# Patient Record
Sex: FEMALE | Race: WHITE | NOT HISPANIC OR LATINO | Employment: OTHER | URBAN - METROPOLITAN AREA
[De-identification: names, ages, dates, MRNs, and addresses within clinical notes are randomized per-mention and may not be internally consistent; named-entity substitution may affect disease eponyms.]

---

## 2020-09-23 ENCOUNTER — TRANSFERRED RECORDS (OUTPATIENT)
Dept: HEALTH INFORMATION MANAGEMENT | Facility: CLINIC | Age: 70
End: 2020-09-23

## 2021-09-17 ENCOUNTER — TRANSFERRED RECORDS (OUTPATIENT)
Dept: HEALTH INFORMATION MANAGEMENT | Facility: CLINIC | Age: 71
End: 2021-09-17

## 2021-10-15 ENCOUNTER — TRANSFERRED RECORDS (OUTPATIENT)
Dept: HEALTH INFORMATION MANAGEMENT | Facility: CLINIC | Age: 71
End: 2021-10-15

## 2021-10-28 ENCOUNTER — TRANSFERRED RECORDS (OUTPATIENT)
Dept: HEALTH INFORMATION MANAGEMENT | Facility: CLINIC | Age: 71
End: 2021-10-28

## 2023-05-09 ENCOUNTER — TELEPHONE (OUTPATIENT)
Dept: ANESTHESIOLOGY | Facility: CLINIC | Age: 73
End: 2023-05-09

## 2023-05-09 NOTE — TELEPHONE ENCOUNTER
Received records from [facility] information [address] Mail stop [id number] [place] [id number]    Sent to scanning

## 2023-05-16 ENCOUNTER — TELEPHONE (OUTPATIENT)
Dept: ANESTHESIOLOGY | Facility: CLINIC | Age: 73
End: 2023-05-16

## 2023-05-16 NOTE — TELEPHONE ENCOUNTER
Received forms from [facility]  [address] [facility] [address]   [phone]  Fax [phone]    Sent to scanning

## 2023-07-19 ENCOUNTER — TELEPHONE (OUTPATIENT)
Dept: ANESTHESIOLOGY | Facility: CLINIC | Age: 73
End: 2023-07-19

## 2023-07-19 NOTE — TELEPHONE ENCOUNTER
Received records from [facility]  [address] [place] [address] ph  [phone]    Sent to scanning

## 2023-07-19 NOTE — TELEPHONE ENCOUNTER
Received records from [facility] [address] [place] [address]   [phone]  Fax [phone]    Sent to scanning

## 2023-07-20 ENCOUNTER — TRANSCRIBE ORDERS (OUTPATIENT)
Dept: OTHER | Age: 73
End: 2023-07-20

## 2023-07-20 ENCOUNTER — TELEPHONE (OUTPATIENT)
Dept: PALLIATIVE MEDICINE | Facility: CLINIC | Age: 73
End: 2023-07-20

## 2023-07-20 DIAGNOSIS — G89.29 CHRONIC KNEE PAIN, UNSPECIFIED LATERALITY: Primary | ICD-10-CM

## 2023-07-20 DIAGNOSIS — M25.569 CHRONIC KNEE PAIN, UNSPECIFIED LATERALITY: Primary | ICD-10-CM

## 2023-07-20 NOTE — TELEPHONE ENCOUNTER
[facility]    Phone Message    May a detailed message be left on voicemail: yes     Reason for Call: Appointment Intake    Referring Provider Name: Osiris Fernandes   : Dena RODRIGUEZ research coordinator   Diagnosis and/or Symptoms: Chronic knee pain, unspecified laterality [M25.569, G89.29]    Writer wasn't sure to schedule as referral is from outside of [facility] and Pt stated she is suppose to be apart of a study and specifically work with  or . Please call back to schedule/advise    Action Taken: Message routed to:  Other: [facility]    Travel Screening: Not Applicable

## 2023-08-20 ENCOUNTER — HEALTH MAINTENANCE LETTER (OUTPATIENT)
Age: 73
End: 2023-08-20

## 2023-08-31 ASSESSMENT — PAIN SCALES - PAIN ENJOYMENT GENERAL ACTIVITY SCALE (PEG)
PEG_TOTALSCORE: 3.67
INTERFERED_ENJOYMENT_LIFE: 2
AVG_PAIN_PASTWEEK: 6
INTERFERED_GENERAL_ACTIVITY: 3

## 2023-08-31 ASSESSMENT — ANXIETY QUESTIONNAIRES
IF YOU CHECKED OFF ANY PROBLEMS ON THIS QUESTIONNAIRE, HOW DIFFICULT HAVE THESE PROBLEMS MADE IT FOR YOU TO DO YOUR WORK, TAKE CARE OF THINGS AT HOME, OR GET ALONG WITH OTHER PEOPLE: NOT DIFFICULT AT ALL
GAD7 TOTAL SCORE: 0
4. TROUBLE RELAXING: NOT AT ALL
6. BECOMING EASILY ANNOYED OR IRRITABLE: NOT AT ALL
1. FEELING NERVOUS, ANXIOUS, OR ON EDGE: NOT AT ALL
7. FEELING AFRAID AS IF SOMETHING AWFUL MIGHT HAPPEN: NOT AT ALL
5. BEING SO RESTLESS THAT IT IS HARD TO SIT STILL: NOT AT ALL
3. WORRYING TOO MUCH ABOUT DIFFERENT THINGS: NOT AT ALL
GAD7 TOTAL SCORE: 0
2. NOT BEING ABLE TO STOP OR CONTROL WORRYING: NOT AT ALL

## 2023-09-07 ENCOUNTER — ANCILLARY PROCEDURE (OUTPATIENT)
Dept: GENERAL RADIOLOGY | Facility: CLINIC | Age: 73
End: 2023-09-07
Attending: ANESTHESIOLOGY
Payer: MEDICARE

## 2023-09-07 ENCOUNTER — OFFICE VISIT (OUTPATIENT)
Dept: ANESTHESIOLOGY | Facility: CLINIC | Age: 73
End: 2023-09-07
Payer: MEDICARE

## 2023-09-07 VITALS
OXYGEN SATURATION: 96 % | DIASTOLIC BLOOD PRESSURE: 82 MMHG | HEART RATE: 74 BPM | RESPIRATION RATE: 16 BRPM | SYSTOLIC BLOOD PRESSURE: 166 MMHG

## 2023-09-07 DIAGNOSIS — G89.29 CHRONIC PAIN OF LEFT KNEE: Primary | ICD-10-CM

## 2023-09-07 DIAGNOSIS — G89.29 CHRONIC PAIN OF LEFT KNEE: ICD-10-CM

## 2023-09-07 DIAGNOSIS — M25.562 CHRONIC PAIN OF LEFT KNEE: ICD-10-CM

## 2023-09-07 DIAGNOSIS — M25.562 CHRONIC PAIN OF LEFT KNEE: Primary | ICD-10-CM

## 2023-09-07 PROCEDURE — 73562 X-RAY EXAM OF KNEE 3: CPT | Mod: LT | Performed by: RADIOLOGY

## 2023-09-07 PROCEDURE — 99204 OFFICE O/P NEW MOD 45 MIN: CPT | Performed by: ANESTHESIOLOGY

## 2023-09-07 RX ORDER — CHLORAL HYDRATE 500 MG
2 CAPSULE ORAL DAILY
COMMUNITY

## 2023-09-07 RX ORDER — LEVOTHYROXINE SODIUM 88 UG/1
88 TABLET ORAL DAILY
COMMUNITY

## 2023-09-07 RX ORDER — ATORVASTATIN CALCIUM 20 MG/1
20 TABLET, FILM COATED ORAL DAILY
COMMUNITY

## 2023-09-07 RX ORDER — VIT C/E/ZN/COPPR/LUTEIN/ZEAXAN 60 MG-6 MG
1 CAPSULE ORAL DAILY
COMMUNITY

## 2023-09-07 RX ORDER — SODIUM PHOSPHATE,MONO-DIBASIC 19G-7G/118
1 ENEMA (ML) RECTAL DAILY
COMMUNITY

## 2023-09-07 RX ORDER — DULOXETIN HYDROCHLORIDE 20 MG/1
20 CAPSULE, DELAYED RELEASE ORAL DAILY
Qty: 30 CAPSULE | Refills: 1 | Status: SHIPPED | OUTPATIENT
Start: 2023-09-07 | End: 2024-06-14

## 2023-09-07 ASSESSMENT — PAIN SCALES - GENERAL: PAINLEVEL: MILD PAIN (2)

## 2023-09-07 NOTE — NURSING NOTE
RN reviewed AVS with patient. Patient to contact clinic if any questions/concerns. Patient verbalized understanding.    Tricia Irizarry RNCC

## 2023-09-07 NOTE — LETTER
9/7/2023       RE: Maribell Mendoza  [address]  [facility] [address]       Dear Colleague,    Thank you for referring your patient, Maribell Mendoza, to the [facility] FOR [facility]NAGEMENT [place] at [facility]. Please see a copy of my visit note below.                          [facility] Consultation    Date of visit: 9/7/2023    Reason for consultation:    Maribell Mendoza is a 73 year old female who is seen in consultation today for potential enrollment in the SKOAP study due to chronic left knee pain.    Primary Care Provider is Osiris Fernandes.  Pain medications are being prescribed by PCP.    Please see the [facility] [place] health questionnaire which the patient completed and reviewed with me in detail.    Chief Complaint:    Chief Complaint   Patient presents with    Consult     New SKOAP       Pain history:  Maribell Mendoza is a 73 year old female who first started having problems with pain in the left knee that has been present for many years. Had an initial skiing injury over 50 years ago. Approximately 2 years ago, was out walking when suddenly her left knee began to hurt. She was seen by [facility] and was diagnosed with arthritis in the knee. At that time she did physical therapy and she did well overall up until this summer when the discomfort increased. She was still able to be active, but notes walking long distances is painful. She has been using a knee brace for stability and this is helpful. When she is sitting there is no pain. However, with sleep there is some pain and she does occasionally notice the pain and it will wake her up.     Swimming is very helpful  Yoga stretches are helpful  30 min in gym and 30 min in pool     PT 2 years ago  No injections  Doesn't take any medications for pain  Sometimes wears a knee brace  Never tried anything topical, rarely takes aleve    Pain  rating: intensity ranges from 0/10 to 5/10, and Averages 1/10 on a 0-10 scale.  Aggravating factors include: walking long distances, prolonged standing  Relieving factors include: Elevating leg, rest  Any bowel or bladder incontinence: n/a    Current treatments include:  Rare Aleve - maybe once per week    Previous medication treatments included:  none    Other treatments have included:  Maribell Mendoza has not been seen at a pain clinic in the past.    PT: yes, 2 years ago  Acupuncture: no  TENs Unit: no  Injections: no    Past Medical History:  No past medical history on file.  There are no problems to display for this patient.      Past Surgical History:  No past surgical history on file.  Medications:  Current Outpatient Medications   Medication Sig Dispense Refill    atorvastatin (LIPITOR) 20 MG tablet Take 20 mg by mouth daily      diclofenac (VOLTAREN) 1 % topical gel Apply 2 g topically 4 times daily as needed for moderate pain 350 g 11    DULoxetine (CYMBALTA) 20 MG capsule Take 1 capsule (20 mg) by mouth daily 30 capsule 1    fish oil-omega-3 fatty acids 1000 MG capsule Take 2 g by mouth daily      glucosamine-chondroitin 500-400 MG CAPS per capsule Take 1 capsule by mouth daily      levothyroxine (SYNTHROID/LEVOTHROID) 88 MCG tablet Take 88 mcg by mouth daily      multivitamin  with lutein (OCUVITE WITH LUTEIN) CAPS per capsule Take 1 capsule by mouth daily       Allergies:   No Known Allergies  Social History:  Home situation: lives alone in [place]  Occupation/Schooling: retired  Tobacco use: denies  Alcohol use: denies  Drug use: denies  History of chemical dependency treatment: denies    Family history:  No family history on file.      Review of Systems:    POSTIVE IN BOLD  GENERAL: fever/chills, fatigue, general unwell feeling, weight gain/loss.  HEAD/EYES:  headache, dizziness, or vision changes.    EARS/NOSE/THROAT:  Nosebleeds, hearing loss, sinus infection, earache, tinnitus.  IMMUNE:   Allergies, cancer, immune deficiency, or infections.  SKIN:  Urticaria, rash, hives  HEME/Lymphatic:   anemia, easy bruising, easy bleeding.  RESPIRATORY:  cough, wheezing, or shortness of breath  CARDIOVASCULAR/Circulation:  Extremity edema, syncope, hypertension, tachycardia, or angina.  GASTROINTESTINAL:  abdominal pain, nausea/emesis, diarrhea, constipation,  hematochezia, or melena.  ENDOCRINE:  Diabetes, steroid use,  thyroid disease or osteoporosis.  MUSCULOSKELETAL: neck pain, back pain, arthralgia, arthritis, or gout.  GENITOURINARY:  frequency, urgency, dysuria, difficulty voiding, hematuria or incontinence.  NEUROLOGIC:  weakness, numbness, paresthesias, seizure, tremor, stroke or memory loss.  PSYCHIATRIC:  depression, anxiety, stress, suicidal thoughts or mood swings.     Physical Exam:  Vitals:    09/07/23 1246   BP: (!) 166/82   BP Location: Right arm   Patient Position: Chair   Cuff Size: Adult Regular   Pulse: 74   Resp: 16   SpO2: 96%     Exam:  Constitutional: healthy, alert, and no distress  Head: normocephalic. Atraumatic.   Eyes: no redness or jaundice noted   ENT: oropharnx normal.  MMM.  Neck supple.    Cardiovascular: RRR no m/g/r   Respiratory: clear   Gastrointestinal: soft, non-tender, normoactive bowel sounds   : deferred  Skin: no suspicious lesions or rashes  Psychiatric: mentation appears normal and affect normal/bright    Musculoskeletal exam:  Gait/Station/Posture: normal, non antalgic    Left knee examination  Inspection: no gross deformity  Palpation: tender to palpation along medial joint line  ROM: full in flexion and extension  Valgus/varus laxity/pain: negative  Anterior drawer test: negative  Posterior drawer test: negative  Mo test: negative  Thessaly test: negative  Lachman test: negative  Ely's test: negative  Positive crepitus with flexion and extension      Neurologic exam:  CN:  Cranial nerves 2-12 are normal  Motor:  5/5 LE strength    Diagnostic tests:  No  pertinent imaging available    Other testing (labs, diagnostics) reviewed:  Labs  No results found for: A1C  Last Comprehensive Metabolic Panel:  No results found for: NA, POTASSIUM, CHLORIDE, CO2, ANIONGAP, GLC, BUN, CR, GFRESTIMATED, GALINDO      [place] Prescription Monitoring Program reviewed    Outside records reviewed        Assessment:  Chronic Left Knee Pain 2/2 Osteoarthritis    Maribell Mendoza is a 73 year old female who presents with the complaints of chronic left knee pain. This has been an ongoing issue for over 2 years. She has previously been seen at an outside orthopedic clinic and has been diagnosed with osteoarthritis of the knee. Based on her history and physical exam today, her symptoms are consistent with this diagnosis. She has enrolled in the SKOAP study, phase 1.     Plan:  Diagnosis reviewed, treatment option addressed, and risk/benefits discussed.  Self-care instructions given.  I am recommending a multidisciplinary treatment plan to help this patient better manage her pain.      Physical Therapy: continue current regimen and new referral placed  Pain Psychologist to address issues of relaxation, behavioral change, coping style, and other factors important to improvement: not att his time  Diagnostic Studies: Xray of Left knee  Medication Management: Rx for cymbalta per study protocol and PRN diclofenac gel   Further procedures recommended: none at this time per study protocol    Recommendations/follow-up for PCP:  none  Follow up: per study protocol    Total time spent was 45 minutes, and more than 50% of face to face time was spent in counseling and/or coordination of care regarding principles of multidisciplinary care, medication management, and therapeutic options.       Answers submitted by the patient for this visit:  OLGA LIDIA-7 (Submitted on 8/31/2023)  OLGA LIDIA 7 TOTAL SCORE: 0      Again, thank you for allowing me to participate in the care of your patient.      Sincerely,    Neha Mehta MD

## 2023-09-07 NOTE — PATIENT INSTRUCTIONS
Medications:    diclofenac (VOLTAREN) 1 % topical gel - Apply 2 g topically 4 times daily as needed for moderate pain     DULoxetine (CYMBALTA) 20 MG capsule - Take 1 capsule (20 mg) by mouth daily     Please provide the clinic with a minium of 1 week notice, on all prescription refills.       Referrals:    External Physical Therapy referral placed.      Imaging:    IMAGING SERVICES HOURS:    All imaging modalities are available from 7 a.m. - 9 p.m. Monday through Friday  X-ray, CT, MRI, and General Ultrasound appointments are available from 7 a.m. -3:30 p.m. on Saturdays  X-ray, CT and MRI appointments are available from 8 a.m. - 4:30 p.m. on Sundays  Please call [phone] to schedule imaging exams       Recommended Follow up:      Follow up per study protocol.    To speak with a nurse, schedule/reschedule/cancel a clinic appointment, or request a medication refill call: [phone].    You can also reach us by [employer]: [email or web address]

## 2023-09-07 NOTE — PROGRESS NOTES
[facility] Consultation    Date of visit: 9/7/2023    Reason for consultation:    Maribell Mendoza is a 73 year old female who is seen in consultation today for potential enrollment in the SKOAP study due to chronic left knee pain.    Primary Care Provider is Osiris Fernandes.  Pain medications are being prescribed by PCP.    Please see the [facility] health questionnaire which the patient completed and reviewed with me in detail.    Chief Complaint:    Chief Complaint   Patient presents with    Consult     [facility]       Pain history:  Maribell Mendoza is a 73 year old female who first started having problems with pain in the left knee that has been present for many years. Had an initial skiing injury over 50 years ago. Approximately 2 years ago, was out walking when suddenly her left knee began to hurt. She was seen by [facility] and was diagnosed with arthritis in the knee. At that time she did physical therapy and she did well overall up until this summer when the discomfort increased. She was still able to be active, but notes walking long distances is painful. She has been using a knee brace for stability and this is helpful. When she is sitting there is no pain. However, with sleep there is some pain and she does occasionally notice the pain and it will wake her up.     Swimming is very helpful  Yoga stretches are helpful  30 min in gym and 30 min in pool     PT 2 years ago  No injections  Doesn't take any medications for pain  Sometimes wears a knee brace  Never tried anything topical, rarely takes aleve    Pain rating: intensity ranges from 0/10 to 5/10, and Averages 1/10 on a 0-10 scale.  Aggravating factors include: walking long distances, prolonged standing  Relieving factors include: Elevating leg, rest  Any bowel or bladder incontinence: n/a    Current treatments include:  Rare Aleve - maybe once per week    Previous medication treatments  included:  none    Other treatments have included:  Maribell Mendoza has not been seen at a pain clinic in the past.    PT: yes, 2 years ago  Acupuncture: no  TENs Unit: no  Injections: no    Past Medical History:  No past medical history on file.  There are no problems to display for this patient.      Past Surgical History:  No past surgical history on file.  Medications:  Current Outpatient Medications   Medication Sig Dispense Refill    atorvastatin (LIPITOR) 20 MG tablet Take 20 mg by mouth daily      diclofenac (VOLTAREN) 1 % topical gel Apply 2 g topically 4 times daily as needed for moderate pain 350 g 11    DULoxetine (CYMBALTA) 20 MG capsule Take 1 capsule (20 mg) by mouth daily 30 capsule 1    fish oil-omega-3 fatty acids 1000 MG capsule Take 2 g by mouth daily      glucosamine-chondroitin 500-400 MG CAPS per capsule Take 1 capsule by mouth daily      levothyroxine (SYNTHROID/LEVOTHROID) 88 MCG tablet Take 88 mcg by mouth daily      multivitamin  with lutein (OCUVITE WITH LUTEIN) CAPS per capsule Take 1 capsule by mouth daily       Allergies:   No Known Allergies  Social History:  Home situation: lives alone in [place]  Occupation/Schooling: retired  Tobacco use: denies  Alcohol use: denies  Drug use: denies  History of chemical dependency treatment: denies    Family history:  No family history on file.      Review of Systems:    POSTIVE IN BOLD  GENERAL: fever/chills, fatigue, general unwell feeling, weight gain/loss.  HEAD/EYES:  headache, dizziness, or vision changes.    EARS/NOSE/THROAT:  Nosebleeds, hearing loss, sinus infection, earache, tinnitus.  IMMUNE:  Allergies, cancer, immune deficiency, or infections.  SKIN:  Urticaria, rash, hives  HEME/Lymphatic:   anemia, easy bruising, easy bleeding.  RESPIRATORY:  cough, wheezing, or shortness of breath  CARDIOVASCULAR/Circulation:  Extremity edema, syncope, hypertension, tachycardia, or angina.  GASTROINTESTINAL:  abdominal pain, nausea/emesis,  diarrhea, constipation,  hematochezia, or melena.  ENDOCRINE:  Diabetes, steroid use,  thyroid disease or osteoporosis.  MUSCULOSKELETAL: neck pain, back pain, arthralgia, arthritis, or gout.  GENITOURINARY:  frequency, urgency, dysuria, difficulty voiding, hematuria or incontinence.  NEUROLOGIC:  weakness, numbness, paresthesias, seizure, tremor, stroke or memory loss.  PSYCHIATRIC:  depression, anxiety, stress, suicidal thoughts or mood swings.     Physical Exam:  Vitals:    09/07/23 1246   BP: (!) 166/82   BP Location: Right arm   Patient Position: Chair   Cuff Size: Adult Regular   Pulse: 74   Resp: 16   SpO2: 96%     Exam:  Constitutional: healthy, alert, and no distress  Head: normocephalic. Atraumatic.   Eyes: no redness or jaundice noted   ENT: oropharnx normal.  MMM.  Neck supple.    Cardiovascular: RRR no m/g/r   Respiratory: clear   Gastrointestinal: soft, non-tender, normoactive bowel sounds   : deferred  Skin: no suspicious lesions or rashes  Psychiatric: mentation appears normal and affect normal/bright    Musculoskeletal exam:  Gait/Station/Posture: normal, non antalgic    Left knee examination  Inspection: no gross deformity  Palpation: tender to palpation along medial joint line  ROM: full in flexion and extension  Valgus/varus laxity/pain: negative  Anterior drawer test: negative  Posterior drawer test: negative  Mo test: negative  Thessaly test: negative  Lachman test: negative  Ely's test: negative  Positive crepitus with flexion and extension      Neurologic exam:  CN:  Cranial nerves 2-12 are normal  Motor:  5/5 LE strength    Diagnostic tests:  No pertinent imaging available    Other testing (labs, diagnostics) reviewed:  Labs  No results found for: A1C  Last Comprehensive Metabolic Panel:  No results found for: NA, POTASSIUM, CHLORIDE, CO2, ANIONGAP, GLC, BUN, CR, GFRESTIMATED, GALINDO      [place] Prescription Monitoring Program reviewed    Outside records reviewed        Assessment:  Chronic  Left Knee Pain 2/2 Osteoarthritis    Maribell Mendoza is a 73 year old female who presents with the complaints of chronic left knee pain. This has been an ongoing issue for over 2 years. She has previously been seen at an outside orthopedic clinic and has been diagnosed with osteoarthritis of the knee. Based on her history and physical exam today, her symptoms are consistent with this diagnosis. She has enrolled in the SKOAP study, phase 1.     Plan:  Diagnosis reviewed, treatment option addressed, and risk/benefits discussed.  Self-care instructions given.  I am recommending a multidisciplinary treatment plan to help this patient better manage her pain.      Physical Therapy: continue current regimen and new referral placed  Pain Psychologist to address issues of relaxation, behavioral change, coping style, and other factors important to improvement: not att his time  Diagnostic Studies: Xray of Left knee  Medication Management: Rx for cymbalta per study protocol and PRN diclofenac gel   Further procedures recommended: none at this time per study protocol    Recommendations/follow-up for PCP:  none  Follow up: per study protocol    Total time spent was 45 minutes, and more than 50% of face to face time was spent in counseling and/or coordination of care regarding principles of multidisciplinary care, medication management, and therapeutic options.     Neha Mehta MD    Pain Medicine  Department of Anesthesiology  [facility]      Answers submitted by the patient for this visit:  OLGA LIDIA-7 (Submitted on 8/31/2023)  OLGA LIDIA 7 TOTAL SCORE: 0

## 2023-09-07 NOTE — NURSING NOTE
Patient presents with:  Consult: New JENNIFER      Mild Pain (2)         What medications are you using for pain? no    (New patients only) Have you been seen by another pain clinic/ provider? no    (Return Patients only) What refills are you needing today? no    Expectations: no, education     Essence Carney, EMT

## 2023-09-13 ASSESSMENT — ACTIVITIES OF DAILY LIVING (ADL)
GO DOWN STAIRS: ACTIVITY IS FAIRLY DIFFICULT
PAIN: THE SYMPTOM AFFECTS MY ACTIVITY MODERATELY
WALK: ACTIVITY IS SOMEWHAT DIFFICULT
STIFFNESS: THE SYMPTOM AFFECTS MY ACTIVITY MODERATELY
KNEEL ON THE FRONT OF YOUR KNEE: ACTIVITY IS SOMEWHAT DIFFICULT
KNEE_ACTIVITY_OF_DAILY_LIVING_SUM: 42
HOW_WOULD_YOU_RATE_THE_CURRENT_FUNCTION_OF_YOUR_KNEE_DURING_YOUR_USUAL_DAILY_ACTIVITIES_ON_A_SCALE_FROM_0_TO_100_WITH_100_BEING_YOUR_LEVEL_OF_KNEE_FUNCTION_PRIOR_TO_YOUR_INJURY_AND_0_BEING_THE_INABILITY_TO_PERFORM_ANY_OF_YOUR_USUAL_DAILY_ACTIVITIES?: 50
WEAKNESS: THE SYMPTOM AFFECTS MY ACTIVITY SLIGHTLY
SWELLING: THE SYMPTOM AFFECTS MY ACTIVITY SLIGHTLY
STAND: ACTIVITY IS MINIMALLY DIFFICULT
KNEE_ACTIVITY_OF_DAILY_LIVING_SCORE: 60
GO UP STAIRS: ACTIVITY IS SOMEWHAT DIFFICULT
RAW_SCORE: 42
HOW_WOULD_YOU_RATE_THE_OVERALL_FUNCTION_OF_YOUR_KNEE_DURING_YOUR_USUAL_DAILY_ACTIVITIES?: NEARLY NORMAL
GIVING WAY, BUCKLING OR SHIFTING OF KNEE: THE SYMPTOM AFFECTS MY ACTIVITY SLIGHTLY
SQUAT: ACTIVITY IS SOMEWHAT DIFFICULT
SIT WITH YOUR KNEE BENT: ACTIVITY IS MINIMALLY DIFFICULT
RISE FROM A CHAIR: ACTIVITY IS MINIMALLY DIFFICULT
LIMPING: THE SYMPTOM AFFECTS MY ACTIVITY SLIGHTLY
AS_A_RESULT_OF_YOUR_KNEE_INJURY,_HOW_WOULD_YOU_RATE_YOUR_CURRENT_LEVEL_OF_DAILY_ACTIVITY?: NEARLY NORMAL

## 2023-09-15 ENCOUNTER — THERAPY VISIT (OUTPATIENT)
Dept: PHYSICAL THERAPY | Facility: CLINIC | Age: 73
End: 2023-09-15
Attending: ANESTHESIOLOGY
Payer: MEDICARE

## 2023-09-15 DIAGNOSIS — G89.29 CHRONIC PAIN OF LEFT KNEE: ICD-10-CM

## 2023-09-15 DIAGNOSIS — M25.562 CHRONIC PAIN OF LEFT KNEE: ICD-10-CM

## 2023-09-15 PROCEDURE — 97110 THERAPEUTIC EXERCISES: CPT | Mod: GP | Performed by: PHYSICAL THERAPIST

## 2023-09-15 PROCEDURE — 97161 PT EVAL LOW COMPLEX 20 MIN: CPT | Mod: GP | Performed by: PHYSICAL THERAPIST

## 2023-09-15 NOTE — PROGRESS NOTES
PHYSICAL THERAPY EVALUATION  Type of Visit: Evaluation    See electronic medical record for Abuse and Falls Screening details.    Subjective       Presenting condition or subjective complaint: L knee pain which started 2 years ago, insidious onset, pain came on with walking after some hill.  Pt eventually went in to [facility] and went through PT and wears a brace, pain eventually improved.  Pt notes that she is walking/biking less now due to fear of pain with her knee.  Pt is doing more swimming right now.  Pain returned this summer, pt is unsure as to why.  Date of onset: 09/07/23    Relevant medical history: Arthritis; Osteoporosis; Pain at night or rest   Dates & types of surgery: 2 bunion surgeries at [facility]   eye surgeries at [facility]  finger surgery @ [facility]    Prior diagnostic imaging/testing results: X-ray     Prior therapy history for the same diagnosis, illness or injury: Yes 2 yrs ago   p/t and gym recommendation by [facility]    Prior Level of Function  Transfers:   Ambulation:   ADL:   IADL:     Living Environment  Social support: Alone   Type of home: 1 level; Basement   Stairs to enter the home: Yes 4 Is there a railing: Yes   Ramp: No   Stairs inside the home: Yes 16 Is there a railing: Yes   Help at home: None  Equipment owned:       Employment: No    Hobbies/Interests: travel   swim biking    Patient goals for therapy: get into the car without difficulty after walking       Objective   KNEE EVALUATION  PAIN: Pain Level at Rest: 0/10  Pain Level with Use: 5/10  Pain Location: knee  Pain Quality: Aching  Pain Frequency: intermittent  Pain is Worst: daytime  Pain is Exacerbated By: walking  Pain is Relieved By: rest  Pain Progression: Worsened  INTEGUMENTARY (edema, incisions):   POSTURE:   GAIT:  Weightbearing Status:   Assistive Device(s):   Gait Deviations:   BALANCE/PROPRIOCEPTION:   WEIGHTBEARING ALIGNMENT:   NON-WEIGHTBEARING ALIGNMENT:   ROM:   (Degrees) Left AROM Left PROM  Right AROM Right PROM   Knee  Flexion wnl wnl wnl wnl   Knee Extension -1 0 0 0   Pain:   End feel:     STRENGTH:  MMT: L quad set: poor, L hamstring 4+/5, L glut med 4/5, L glut max 4+/5, L quadriceps 4+/5.   FLEXIBILITY:   SPECIAL TESTS:   FUNCTIONAL TESTS:   PALPATION:  TTP: L pes anserine, medial joint line  JOINT MOBILITY:     Assessment & Plan   CLINICAL IMPRESSIONS  Medical Diagnosis: Chronic pain of left knee    Treatment Diagnosis: L knee pain   Impression/Assessment: Patient is a 73 year old female with L knee complaints.  The following significant findings have been identified: Pain, Decreased ROM/flexibility, Decreased strength, Impaired gait, Impaired muscle performance, and Decreased activity tolerance. These impairments interfere with their ability to perform self care tasks, recreational activities, household chores, driving , household mobility, and community mobility as compared to previous level of function.     Clinical Decision Making (Complexity):  Clinical Presentation: Stable/Uncomplicated  Clinical Presentation Rationale: based on medical and personal factors listed in PT evaluation  Clinical Decision Making (Complexity): Low complexity    PLAN OF CARE  Treatment Interventions:  Interventions: Gait Training, Manual Therapy, Neuromuscular Re-education, Therapeutic Activity, Therapeutic Exercise, Self-Care/Home Management    Long Term Goals            Frequency of Treatment: 1x/week  Duration of Treatment: 8 weeks    Education Assessment:   Learner/Method: Patient;Listening;Demonstration;Pictures/Video  Education Comments: Educated pt on findings of the evaluation and reasoning for plan of care.  Pt was able to understand how treatment plan aligns with goals.    Risks and benefits of evaluation/treatment have been explained.   Patient/Family/caregiver agrees with Plan of Care.     Evaluation Time:           Signing Clinician: Ady Pelletier PT      [facility]                                                                                    OUTPATIENT PHYSICAL THERAPY      PLAN OF TREATMENT FOR OUTPATIENT REHABILITATION   Patient's Last Name, First Name, Maribell Wallace YOB: 1950   Provider's Name   [facility]   Medical Record No.  [id number]     Onset Date: 09/07/23  Start of Care Date: 09/15/23     Medical Diagnosis:  Chronic pain of left knee      PT Treatment Diagnosis:  L knee pain Plan of Treatment  Frequency/Duration: 1x/week/ 8 weeks    Certification date from 09/15/23 to 11/10/23         See note for plan of treatment details and functional goals     Ady Pelletier PT                         I CERTIFY THE NEED FOR THESE SERVICES FURNISHED UNDER        THIS PLAN OF TREATMENT AND WHILE UNDER MY CARE     (Physician attestation of this document indicates review and certification of the therapy plan).                Referring Provider:  Neha Mehta      Initial Assessment  See Epic Evaluation- Start of Care Date: 09/15/23

## 2023-10-03 ENCOUNTER — THERAPY VISIT (OUTPATIENT)
Dept: PHYSICAL THERAPY | Facility: CLINIC | Age: 73
End: 2023-10-03
Attending: ANESTHESIOLOGY
Payer: MEDICARE

## 2023-10-03 ENCOUNTER — OFFICE VISIT (OUTPATIENT)
Dept: URGENT CARE | Facility: URGENT CARE | Age: 73
End: 2023-10-03
Payer: MEDICARE

## 2023-10-03 VITALS
DIASTOLIC BLOOD PRESSURE: 73 MMHG | TEMPERATURE: 97.8 F | SYSTOLIC BLOOD PRESSURE: 136 MMHG | OXYGEN SATURATION: 99 % | WEIGHT: 161 LBS | RESPIRATION RATE: 14 BRPM | HEART RATE: 66 BPM

## 2023-10-03 DIAGNOSIS — G89.29 CHRONIC PAIN OF LEFT KNEE: Primary | ICD-10-CM

## 2023-10-03 DIAGNOSIS — M25.562 CHRONIC PAIN OF LEFT KNEE: Primary | ICD-10-CM

## 2023-10-03 DIAGNOSIS — T16.1XXA ACUTE FOREIGN BODY OF RIGHT EAR CANAL, INITIAL ENCOUNTER: Primary | ICD-10-CM

## 2023-10-03 PROCEDURE — 97110 THERAPEUTIC EXERCISES: CPT | Mod: GP | Performed by: PHYSICAL THERAPIST

## 2023-10-03 PROCEDURE — 69200 CLEAR OUTER EAR CANAL: CPT | Mod: RT | Performed by: FAMILY MEDICINE

## 2023-10-03 PROCEDURE — 99203 OFFICE O/P NEW LOW 30 MIN: CPT | Mod: 25 | Performed by: FAMILY MEDICINE

## 2023-10-03 ASSESSMENT — ACTIVITIES OF DAILY LIVING (ADL)
HOW_WOULD_YOU_RATE_THE_CURRENT_FUNCTION_OF_YOUR_KNEE_DURING_YOUR_USUAL_DAILY_ACTIVITIES_ON_A_SCALE_FROM_0_TO_100_WITH_100_BEING_YOUR_LEVEL_OF_KNEE_FUNCTION_PRIOR_TO_YOUR_INJURY_AND_0_BEING_THE_INABILITY_TO_PERFORM_ANY_OF_YOUR_USUAL_DAILY_ACTIVITIES?: 70
RAW_SCORE: 60
STAND: ACTIVITY IS NOT DIFFICULT
SIT WITH YOUR KNEE BENT: ACTIVITY IS MINIMALLY DIFFICULT
PAIN: I HAVE THE SYMPTOM BUT IT DOES NOT AFFECT MY ACTIVITY
AS_A_RESULT_OF_YOUR_KNEE_INJURY,_HOW_WOULD_YOU_RATE_YOUR_CURRENT_LEVEL_OF_DAILY_ACTIVITY?: NEARLY NORMAL
KNEE_ACTIVITY_OF_DAILY_LIVING_SCORE: 85.71
LIMPING: I DO NOT HAVE THE SYMPTOM
SWELLING: I DO NOT HAVE THE SYMPTOM
GIVING WAY, BUCKLING OR SHIFTING OF KNEE: I DO NOT HAVE THE SYMPTOM
GO UP STAIRS: ACTIVITY IS MINIMALLY DIFFICULT
HOW_WOULD_YOU_RATE_THE_OVERALL_FUNCTION_OF_YOUR_KNEE_DURING_YOUR_USUAL_DAILY_ACTIVITIES?: NEARLY NORMAL
STIFFNESS: I DO NOT HAVE THE SYMPTOM
GO DOWN STAIRS: ACTIVITY IS SOMEWHAT DIFFICULT
KNEEL ON THE FRONT OF YOUR KNEE: ACTIVITY IS SOMEWHAT DIFFICULT
SQUAT: ACTIVITY IS MINIMALLY DIFFICULT
RISE FROM A CHAIR: ACTIVITY IS NOT DIFFICULT
KNEE_ACTIVITY_OF_DAILY_LIVING_SUM: 60
WEAKNESS: I HAVE THE SYMPTOM BUT IT DOES NOT AFFECT MY ACTIVITY
WALK: ACTIVITY IS MINIMALLY DIFFICULT

## 2023-10-03 NOTE — PROGRESS NOTES
(T16.1XXA) Acute foreign body of right ear canal, initial encounter  (primary encounter diagnosis)    Comment:   Removed uneventfully.    Plan:   No further treatment indicated.  Observe.      CHIEF COMPLAINT    Foreign body in right ear.      HISTORY    This patient was cleaning the right ear with a Q-tip and the cotton and fell off and remains in the ear canal.      EXAM  /73   Pulse 66   Temp 97.8  F (36.6  C)   Resp 14   Wt 73 kg (161 lb)   SpO2 99%     Exam does show the cotton head of Q-tip deep in the right ear canal.    After discussion, this was uneventfully removed with an alligator forceps.    TM looks a bit irritated but is intact.  No bleeding or drainage.  No sign of external otitis.

## 2024-01-07 ENCOUNTER — HEALTH MAINTENANCE LETTER (OUTPATIENT)
Age: 74
End: 2024-01-07

## 2024-05-16 ENCOUNTER — TRANSFERRED RECORDS (OUTPATIENT)
Dept: HEALTH INFORMATION MANAGEMENT | Facility: CLINIC | Age: 74
End: 2024-05-16
Payer: MEDICARE

## 2024-05-17 ENCOUNTER — TELEPHONE (OUTPATIENT)
Dept: ANESTHESIOLOGY | Facility: CLINIC | Age: 74
End: 2024-05-17
Payer: MEDICARE

## 2024-05-17 NOTE — TELEPHONE ENCOUNTER
Received forms from [facility] [address] [address]     [phone]  Fax [phone]    Sent to scanning

## 2024-06-13 ENCOUNTER — HOSPITAL ENCOUNTER (INPATIENT)
Facility: CLINIC | Age: 74
LOS: 7 days | Discharge: HOME HEALTH | DRG: 071 | End: 2024-06-20
Attending: EMERGENCY MEDICINE | Admitting: INTERNAL MEDICINE
Payer: MEDICARE

## 2024-06-13 ENCOUNTER — APPOINTMENT (OUTPATIENT)
Dept: CT IMAGING | Facility: CLINIC | Age: 74
DRG: 071 | End: 2024-06-13
Attending: EMERGENCY MEDICINE
Payer: MEDICARE

## 2024-06-13 DIAGNOSIS — G93.40 ACUTE ENCEPHALOPATHY: ICD-10-CM

## 2024-06-13 PROBLEM — F29 PSYCHOSIS (H): Status: ACTIVE | Noted: 2024-06-13

## 2024-06-13 LAB
ALBUMIN SERPL BCG-MCNC: 4.1 G/DL (ref 3.5–5.2)
ALBUMIN UR-MCNC: NEGATIVE MG/DL
ALP SERPL-CCNC: 61 U/L (ref 40–150)
ALT SERPL W P-5'-P-CCNC: 33 U/L (ref 0–50)
AMMONIA PLAS-SCNC: 20 UMOL/L (ref 11–51)
AMPHETAMINES UR QL SCN: NORMAL
ANION GAP SERPL CALCULATED.3IONS-SCNC: 17 MMOL/L (ref 7–15)
APPEARANCE UR: CLEAR
AST SERPL W P-5'-P-CCNC: 41 U/L (ref 0–45)
BARBITURATES UR QL SCN: NORMAL
BASOPHILS # BLD AUTO: 0 10E3/UL (ref 0–0.2)
BASOPHILS NFR BLD AUTO: 1 %
BENZODIAZ UR QL SCN: NORMAL
BILIRUB DIRECT SERPL-MCNC: 0.24 MG/DL (ref 0–0.3)
BILIRUB SERPL-MCNC: 1.3 MG/DL
BILIRUB UR QL STRIP: NEGATIVE
BUN SERPL-MCNC: 27.3 MG/DL (ref 8–23)
BZE UR QL SCN: NORMAL
CALCIUM SERPL-MCNC: 9.9 MG/DL (ref 8.8–10.2)
CANNABINOIDS UR QL SCN: NORMAL
CHLORIDE SERPL-SCNC: 101 MMOL/L (ref 98–107)
COLOR UR AUTO: ABNORMAL
CREAT SERPL-MCNC: 1.01 MG/DL (ref 0.51–0.95)
DEPRECATED HCO3 PLAS-SCNC: 20 MMOL/L (ref 22–29)
EGFRCR SERPLBLD CKD-EPI 2021: 58 ML/MIN/1.73M2
EOSINOPHIL # BLD AUTO: 0.1 10E3/UL (ref 0–0.7)
EOSINOPHIL NFR BLD AUTO: 1 %
ERYTHROCYTE [DISTWIDTH] IN BLOOD BY AUTOMATED COUNT: 14 % (ref 10–15)
ETHANOL SERPL-MCNC: <0.01 G/DL
FENTANYL UR QL: NORMAL
GLUCOSE SERPL-MCNC: 82 MG/DL (ref 70–99)
GLUCOSE UR STRIP-MCNC: NEGATIVE MG/DL
HCT VFR BLD AUTO: 44 % (ref 35–47)
HGB BLD-MCNC: 14.9 G/DL (ref 11.7–15.7)
HGB UR QL STRIP: NEGATIVE
IMM GRANULOCYTES # BLD: 0 10E3/UL
IMM GRANULOCYTES NFR BLD: 0 %
KETONES UR STRIP-MCNC: 10 MG/DL
LEUKOCYTE ESTERASE UR QL STRIP: NEGATIVE
LIPASE SERPL-CCNC: 63 U/L (ref 13–60)
LYMPHOCYTES # BLD AUTO: 1.9 10E3/UL (ref 0.8–5.3)
LYMPHOCYTES NFR BLD AUTO: 23 %
MCH RBC QN AUTO: 28.8 PG (ref 26.5–33)
MCHC RBC AUTO-ENTMCNC: 33.9 G/DL (ref 31.5–36.5)
MCV RBC AUTO: 85 FL (ref 78–100)
MONOCYTES # BLD AUTO: 0.8 10E3/UL (ref 0–1.3)
MONOCYTES NFR BLD AUTO: 10 %
MUCOUS THREADS #/AREA URNS LPF: PRESENT /LPF
NEUTROPHILS # BLD AUTO: 5.4 10E3/UL (ref 1.6–8.3)
NEUTROPHILS NFR BLD AUTO: 66 %
NITRATE UR QL: NEGATIVE
NRBC # BLD AUTO: 0 10E3/UL
NRBC BLD AUTO-RTO: 0 /100
OPIATES UR QL SCN: NORMAL
PCP QUAL URINE (ROCHE): NORMAL
PH UR STRIP: 5 [PH] (ref 5–7)
PLATELET # BLD AUTO: 216 10E3/UL (ref 150–450)
POTASSIUM SERPL-SCNC: 3.8 MMOL/L (ref 3.4–5.3)
PROT SERPL-MCNC: 7.1 G/DL (ref 6.4–8.3)
RBC # BLD AUTO: 5.18 10E6/UL (ref 3.8–5.2)
RBC URINE: 0 /HPF
SODIUM SERPL-SCNC: 138 MMOL/L (ref 135–145)
SP GR UR STRIP: 1.01 (ref 1–1.03)
SQUAMOUS EPITHELIAL: <1 /HPF
TSH SERPL DL<=0.005 MIU/L-ACNC: 3 UIU/ML (ref 0.3–4.2)
UROBILINOGEN UR STRIP-MCNC: NORMAL MG/DL
WBC # BLD AUTO: 8.2 10E3/UL (ref 4–11)
WBC URINE: 1 /HPF

## 2024-06-13 PROCEDURE — 99285 EMERGENCY DEPT VISIT HI MDM: CPT | Mod: 25

## 2024-06-13 PROCEDURE — 80048 BASIC METABOLIC PNL TOTAL CA: CPT | Performed by: EMERGENCY MEDICINE

## 2024-06-13 PROCEDURE — 93005 ELECTROCARDIOGRAM TRACING: CPT

## 2024-06-13 PROCEDURE — 99223 1ST HOSP IP/OBS HIGH 75: CPT | Mod: AI | Performed by: INTERNAL MEDICINE

## 2024-06-13 PROCEDURE — 85025 COMPLETE CBC W/AUTO DIFF WBC: CPT | Performed by: EMERGENCY MEDICINE

## 2024-06-13 PROCEDURE — 250N000011 HC RX IP 250 OP 636: Performed by: EMERGENCY MEDICINE

## 2024-06-13 PROCEDURE — 258N000003 HC RX IP 258 OP 636: Mod: JZ | Performed by: EMERGENCY MEDICINE

## 2024-06-13 PROCEDURE — 82077 ASSAY SPEC XCP UR&BREATH IA: CPT | Performed by: EMERGENCY MEDICINE

## 2024-06-13 PROCEDURE — 36415 COLL VENOUS BLD VENIPUNCTURE: CPT | Performed by: EMERGENCY MEDICINE

## 2024-06-13 PROCEDURE — 80307 DRUG TEST PRSMV CHEM ANLYZR: CPT | Performed by: EMERGENCY MEDICINE

## 2024-06-13 PROCEDURE — 83690 ASSAY OF LIPASE: CPT | Performed by: EMERGENCY MEDICINE

## 2024-06-13 PROCEDURE — 70450 CT HEAD/BRAIN W/O DYE: CPT | Mod: MA

## 2024-06-13 PROCEDURE — 82248 BILIRUBIN DIRECT: CPT | Performed by: EMERGENCY MEDICINE

## 2024-06-13 PROCEDURE — 82140 ASSAY OF AMMONIA: CPT | Performed by: EMERGENCY MEDICINE

## 2024-06-13 PROCEDURE — 96372 THER/PROPH/DIAG INJ SC/IM: CPT | Performed by: EMERGENCY MEDICINE

## 2024-06-13 PROCEDURE — 81001 URINALYSIS AUTO W/SCOPE: CPT | Performed by: EMERGENCY MEDICINE

## 2024-06-13 PROCEDURE — 250N000013 HC RX MED GY IP 250 OP 250 PS 637: Performed by: EMERGENCY MEDICINE

## 2024-06-13 PROCEDURE — 120N000001 HC R&B MED SURG/OB

## 2024-06-13 PROCEDURE — 84443 ASSAY THYROID STIM HORMONE: CPT | Performed by: EMERGENCY MEDICINE

## 2024-06-13 RX ORDER — LORAZEPAM 0.5 MG/1
2 TABLET ORAL ONCE
Status: DISCONTINUED | OUTPATIENT
Start: 2024-06-13 | End: 2024-06-13

## 2024-06-13 RX ORDER — OLANZAPINE 5 MG/1
10 TABLET, ORALLY DISINTEGRATING ORAL ONCE
Status: DISCONTINUED | OUTPATIENT
Start: 2024-06-13 | End: 2024-06-13

## 2024-06-13 RX ADMIN — SODIUM CHLORIDE, POTASSIUM CHLORIDE, SODIUM LACTATE AND CALCIUM CHLORIDE 1000 ML: 600; 310; 30; 20 INJECTION, SOLUTION INTRAVENOUS at 21:27

## 2024-06-13 RX ADMIN — MIDAZOLAM 5 MG: 1 INJECTION INTRAMUSCULAR; INTRAVENOUS at 20:42

## 2024-06-13 RX ADMIN — OLANZAPINE 10 MG: 5 TABLET, ORALLY DISINTEGRATING ORAL at 18:30

## 2024-06-13 ASSESSMENT — ACTIVITIES OF DAILY LIVING (ADL)
ADLS_ACUITY_SCORE: 35

## 2024-06-13 NOTE — ED NOTES
"Pt arrives from home she apperars to be very altered.  Pt refuses vitals stating that they were taken \"when she was born\" Pt states that she has been having her urine tested since before she was born.  Son is also here. He is very concerned.  Pt states that she was a \"research object and that she needs to be secure\" Pt oriented to self   She states she has been a \"research subject for eternity and gives her insight\"  "

## 2024-06-13 NOTE — ED PROVIDER NOTES
"  Emergency Department Note      History of Present Illness     Chief Complaint  Altered Mental Status    HPI  Maribell Mendoza is a 74 year old female who presents with an altered mental status. Patient states that she is not in pain. Patient's son reports that the patient was fine on their vacation to [place] two weeks ago. Patient reports having \"features\" of paranoia and psychosis. Son states that the patient has been stressed with moving and uncovering old memories as well as potential AI spying. Son endorses decreased urinary output, loss of appetite, insomnia. Patient reports that she has a history of mental health concerns that she was medicated for, including depression. Patient denies a history of alcohol abuse, IV street drug abuse, marijuana abuse, cocaine abuse. She denies fevers, chills, cold symptoms. She also denies falls and recent trauma.      Independent Historian  Son as detailed above.    Review of External Notes  I reviewed care everywhere and I do not see previously documented history of antipsychotic medication or mental health diagnoses.  Past Medical History   Medical History and Problem List  Osteoporosis  Goiter  Hypothyroidism  Hyperlipidemia    Medications  Lipitor  Synthroid    Surgical History   Cataracts surgery  Bilateral retinal surgery  Laparoscopy  [place] teeth extraction  Varicose vein surgery  Bunionectomy  Physical Exam   Patient Vitals for the past 24 hrs:   BP Temp Temp src Pulse Resp SpO2   06/13/24 2245 (!) 176/76 -- -- -- -- --   06/13/24 2009 -- 97.2  F (36.2  C) Temporal -- -- --   06/13/24 1921 -- -- -- -- 18 --   06/13/24 1913 (!) 182/72 -- -- 80 -- 97 %     Physical Exam    HENT:  mmm, no rhinorrhea, atraumatic  Eyes: periorbital tissues and sclera normal, no nystagmus, 4 mm reactive bilaterally  Neck: supple, no abnormal swelling  Lungs:  CTAB,  no resp distress  CV: rrr, no m/r/g, ppi  Abd: soft, nontender, nondistended, no rebound/masses/guarding/hsm  Ext: no " peripheral edema  Skin: warm, dry, well perfused, no rashes/bruising/lesions on exposed skin  Neuro: alert, disoriented, will follow simple commands, 5 out of 5  strength bilateral upper extremities, plantarflexion dorsiflexion 5 out of 5 bilateral lower extremities, gait stable, sensation intact.  Facial movements symmetric  Psych: Disoriented, paranoia, tangential thoughts    Diagnostics   Lab Results   Labs Ordered and Resulted from Time of ED Arrival to Time of ED Departure   BASIC METABOLIC PANEL - Abnormal       Result Value    Sodium 138      Potassium 3.8      Chloride 101      Carbon Dioxide (CO2) 20 (*)     Anion Gap 17 (*)     Urea Nitrogen 27.3 (*)     Creatinine 1.01 (*)     GFR Estimate 58 (*)     Calcium 9.9      Glucose 82     ROUTINE UA WITH MICROSCOPIC - Abnormal    Color Urine Light Yellow      Appearance Urine Clear      Glucose Urine Negative      Bilirubin Urine Negative      Ketones Urine 10 (*)     Specific Gravity Urine 1.006      Blood Urine Negative      pH Urine 5.0      Protein Albumin Urine Negative      Urobilinogen Urine Normal      Nitrite Urine Negative      Leukocyte Esterase Urine Negative      Mucus Urine Present (*)     RBC Urine 0      WBC Urine 1      Squamous Epithelials Urine <1     HEPATIC FUNCTION PANEL - Abnormal    Protein Total 7.1      Albumin 4.1      Bilirubin Total 1.3 (*)     Alkaline Phosphatase 61      AST 41      ALT 33      Bilirubin Direct 0.24     LIPASE - Abnormal    Lipase 63 (*)    TSH WITH FREE T4 REFLEX - Normal    TSH 3.00     ETHYL ALCOHOL LEVEL - Normal    Alcohol ethyl <0.01     AMMONIA - Normal    Ammonia 20     URINE DRUG SCREEN PANEL - Normal    Amphetamines Urine Screen Negative      Barbituates Urine Screen Negative      Benzodiazepine Urine Screen Negative      Cannabinoids Urine Screen Negative      Cocaine Urine Screen Negative      Fentanyl Qual Urine Screen Negative      Opiates Urine Screen Negative      PCP Urine Screen Negative      CBC WITH PLATELETS AND DIFFERENTIAL    WBC Count 8.2      RBC Count 5.18      Hemoglobin 14.9      Hematocrit 44.0      MCV 85      MCH 28.8      MCHC 33.9      RDW 14.0      Platelet Count 216      % Neutrophils 66      % Lymphocytes 23      % Monocytes 10      % Eosinophils 1      % Basophils 1      % Immature Granulocytes 0      NRBCs per 100 WBC 0      Absolute Neutrophils 5.4      Absolute Lymphocytes 1.9      Absolute Monocytes 0.8      Absolute Eosinophils 0.1      Absolute Basophils 0.0      Absolute Immature Granulocytes 0.0      Absolute NRBCs 0.0         Imaging  Head CT w/o contrast   Final Result   IMPRESSION:   1.  No acute intracranial process.        ECG  ECG taken at 1753, ECG read at 1800  Normal sinus rhythm  Cannot rule out Inferior infarct, age undetermined  Cannot rule out Anterior infarct, age undetermined   Rate 74 bpm. NE interval 170 ms. QRS duration 102 ms. QT/QTc 392/435 ms. P-R-T axes 33 55 39.     Independent Interpretation  None  ED Course    Medications Administered  Medications   OLANZapine zydis (zyPREXA) ODT tab 10 mg (10 mg Oral $Given 6/13/24 1830)   midazolam (VERSED) injection 5 mg (5 mg Intramuscular $Given 6/13/24 2042)   LORazepam (ATIVAN) tablet 2 mg (2 mg Oral Not Given 6/13/24 2204)   lactated ringers BOLUS 1,000 mL (1,000 mLs Intravenous $New Bag 6/13/24 2127)       Procedures  Procedures     Discussion of Management  Admitting hospitalist    Social Determinants of Health adding to complexity of care  None    ED Course  ED Course as of 06/13/24 1411   Thu Jun 13, 2024   1852 I obtained history and examined the patient as noted above       Medical Decision Making / Diagnosis   CMS Diagnoses: None    MIPS     None    [facility]  Maribelllara Mendoza is a 74 year old female presenting with her son for evaluation of altered mentation in the.  Presentation is interesting in that she has features of paranoia, tangential thoughts, pressured speech but has no history of mental health  diagnoses or being on psychotropic medication.  No preceding trauma or fevers.  No lateralizing neurologic deficits here in the ED.  Workup as above shows no obvious underlying organic medical etiology.  The BMP abnormalities are likely simple mild dehydration.  Cares were difficult as we had did negotiate blood test, vital signs, and Zyprexa which she did eventually agree to take orally.  Given the need for a urinalysis and unable to spontaneously void we did do a straight cath which required IM Versed.  Head CT without acute findings.  Discussed with our mental health  the best place for his admission and likely to start medically with our local psychiatry resources rather than mental health given the lack of history of similar her age the chronicity with her symptoms only developing within the last days to week etc.    Disposition  The patient was admitted to the hospital.     ICD-10 Codes:    ICD-10-CM    1. Acute encephalopathy  G93.40            Discharge Medications  New Prescriptions    No medications on file         Scribe Disclosure:  Liborio MELARA, am serving as a scribe at 5:39 PM on 6/13/2024 to document services personally performed by Kyrie Khoury MD based on my observations and the provider's statements to me.        Kyrie Khoury MD  06/13/24 [id number]

## 2024-06-14 ENCOUNTER — TRANSFERRED RECORDS (OUTPATIENT)
Dept: EMERGENCY MEDICINE | Facility: CLINIC | Age: 74
End: 2024-06-14

## 2024-06-14 LAB
ANION GAP SERPL CALCULATED.3IONS-SCNC: 12 MMOL/L (ref 7–15)
ATRIAL RATE - MUSE: 74 BPM
BASOPHILS # BLD AUTO: 0 10E3/UL (ref 0–0.2)
BASOPHILS NFR BLD AUTO: 0 %
BUN SERPL-MCNC: 19.2 MG/DL (ref 8–23)
CALCIUM SERPL-MCNC: 8.9 MG/DL (ref 8.8–10.2)
CHLORIDE SERPL-SCNC: 106 MMOL/L (ref 98–107)
CREAT SERPL-MCNC: 0.89 MG/DL (ref 0.51–0.95)
DEPRECATED HCO3 PLAS-SCNC: 23 MMOL/L (ref 22–29)
DIASTOLIC BLOOD PRESSURE - MUSE: NORMAL MMHG
EGFRCR SERPLBLD CKD-EPI 2021: 67 ML/MIN/1.73M2
EOSINOPHIL # BLD AUTO: 0.1 10E3/UL (ref 0–0.7)
EOSINOPHIL NFR BLD AUTO: 1 %
ERYTHROCYTE [DISTWIDTH] IN BLOOD BY AUTOMATED COUNT: 14.4 % (ref 10–15)
GLUCOSE BLDC GLUCOMTR-MCNC: 168 MG/DL (ref 70–99)
GLUCOSE SERPL-MCNC: 68 MG/DL (ref 70–99)
HCT VFR BLD AUTO: 43.4 % (ref 35–47)
HGB BLD-MCNC: 14.4 G/DL (ref 11.7–15.7)
IMM GRANULOCYTES # BLD: 0 10E3/UL
IMM GRANULOCYTES NFR BLD: 0 %
INTERPRETATION ECG - MUSE: NORMAL
LYMPHOCYTES # BLD AUTO: 1.3 10E3/UL (ref 0.8–5.3)
LYMPHOCYTES NFR BLD AUTO: 21 %
MCH RBC QN AUTO: 28.8 PG (ref 26.5–33)
MCHC RBC AUTO-ENTMCNC: 33.2 G/DL (ref 31.5–36.5)
MCV RBC AUTO: 87 FL (ref 78–100)
MONOCYTES # BLD AUTO: 0.6 10E3/UL (ref 0–1.3)
MONOCYTES NFR BLD AUTO: 10 %
NEUTROPHILS # BLD AUTO: 4.3 10E3/UL (ref 1.6–8.3)
NEUTROPHILS NFR BLD AUTO: 68 %
NRBC # BLD AUTO: 0 10E3/UL
NRBC BLD AUTO-RTO: 0 /100
P AXIS - MUSE: 33 DEGREES
PLATELET # BLD AUTO: 189 10E3/UL (ref 150–450)
POTASSIUM SERPL-SCNC: 4.3 MMOL/L (ref 3.4–5.3)
PR INTERVAL - MUSE: 170 MS
QRS DURATION - MUSE: 102 MS
QT - MUSE: 392 MS
QTC - MUSE: 435 MS
R AXIS - MUSE: 55 DEGREES
RBC # BLD AUTO: 5 10E6/UL (ref 3.8–5.2)
SODIUM SERPL-SCNC: 141 MMOL/L (ref 135–145)
SYSTOLIC BLOOD PRESSURE - MUSE: NORMAL MMHG
T AXIS - MUSE: 39 DEGREES
VENTRICULAR RATE- MUSE: 74 BPM
WBC # BLD AUTO: 6.3 10E3/UL (ref 4–11)

## 2024-06-14 PROCEDURE — 120N000001 HC R&B MED SURG/OB

## 2024-06-14 PROCEDURE — 99233 SBSQ HOSP IP/OBS HIGH 50: CPT | Performed by: HOSPITALIST

## 2024-06-14 PROCEDURE — 85025 COMPLETE CBC W/AUTO DIFF WBC: CPT | Performed by: INTERNAL MEDICINE

## 2024-06-14 PROCEDURE — 258N000003 HC RX IP 258 OP 636: Mod: JZ | Performed by: INTERNAL MEDICINE

## 2024-06-14 PROCEDURE — 250N000013 HC RX MED GY IP 250 OP 250 PS 637: Performed by: HOSPITALIST

## 2024-06-14 PROCEDURE — 82374 ASSAY BLOOD CARBON DIOXIDE: CPT | Performed by: INTERNAL MEDICINE

## 2024-06-14 PROCEDURE — 36415 COLL VENOUS BLD VENIPUNCTURE: CPT | Performed by: INTERNAL MEDICINE

## 2024-06-14 PROCEDURE — 82962 GLUCOSE BLOOD TEST: CPT

## 2024-06-14 PROCEDURE — 36415 COLL VENOUS BLD VENIPUNCTURE: CPT | Performed by: PSYCHIATRY & NEUROLOGY

## 2024-06-14 PROCEDURE — 86255 FLUORESCENT ANTIBODY SCREEN: CPT | Performed by: PSYCHIATRY & NEUROLOGY

## 2024-06-14 RX ORDER — LEVOTHYROXINE SODIUM 88 UG/1
88 TABLET ORAL DAILY
Status: DISCONTINUED | OUTPATIENT
Start: 2024-06-14 | End: 2024-06-20 | Stop reason: HOSPADM

## 2024-06-14 RX ORDER — CALCIUM CARBONATE 500 MG/1
1000 TABLET, CHEWABLE ORAL 4 TIMES DAILY PRN
Status: DISCONTINUED | OUTPATIENT
Start: 2024-06-14 | End: 2024-06-20 | Stop reason: HOSPADM

## 2024-06-14 RX ORDER — AMOXICILLIN 250 MG
2 CAPSULE ORAL 2 TIMES DAILY PRN
Status: DISCONTINUED | OUTPATIENT
Start: 2024-06-14 | End: 2024-06-20 | Stop reason: HOSPADM

## 2024-06-14 RX ORDER — AMOXICILLIN 250 MG
1 CAPSULE ORAL 2 TIMES DAILY PRN
Status: DISCONTINUED | OUTPATIENT
Start: 2024-06-14 | End: 2024-06-20 | Stop reason: HOSPADM

## 2024-06-14 RX ORDER — ATORVASTATIN CALCIUM 20 MG/1
20 TABLET, FILM COATED ORAL DAILY
Status: DISCONTINUED | OUTPATIENT
Start: 2024-06-14 | End: 2024-06-20 | Stop reason: HOSPADM

## 2024-06-14 RX ORDER — HYDRALAZINE HYDROCHLORIDE 20 MG/ML
10 INJECTION INTRAMUSCULAR; INTRAVENOUS EVERY 4 HOURS PRN
Status: DISCONTINUED | OUTPATIENT
Start: 2024-06-14 | End: 2024-06-20 | Stop reason: HOSPADM

## 2024-06-14 RX ORDER — LIDOCAINE 40 MG/G
CREAM TOPICAL
Status: DISCONTINUED | OUTPATIENT
Start: 2024-06-14 | End: 2024-06-20 | Stop reason: HOSPADM

## 2024-06-14 RX ORDER — SODIUM CHLORIDE 9 MG/ML
INJECTION, SOLUTION INTRAVENOUS CONTINUOUS
Status: DISCONTINUED | OUTPATIENT
Start: 2024-06-14 | End: 2024-06-15

## 2024-06-14 RX ADMIN — SODIUM CHLORIDE: 9 INJECTION, SOLUTION INTRAVENOUS at 10:10

## 2024-06-14 RX ADMIN — ATORVASTATIN CALCIUM 20 MG: 20 TABLET, FILM COATED ORAL at 10:50

## 2024-06-14 RX ADMIN — SODIUM CHLORIDE: 9 INJECTION, SOLUTION INTRAVENOUS at 20:56

## 2024-06-14 RX ADMIN — LEVOTHYROXINE SODIUM 88 MCG: 0.09 TABLET ORAL at 10:50

## 2024-06-14 RX ADMIN — SODIUM CHLORIDE: 9 INJECTION, SOLUTION INTRAVENOUS at 00:52

## 2024-06-14 ASSESSMENT — ACTIVITIES OF DAILY LIVING (ADL)
ADLS_ACUITY_SCORE: 29
DIFFICULTY_COMMUNICATING: NO
ADLS_ACUITY_SCORE: 29
ADLS_ACUITY_SCORE: 29
ADLS_ACUITY_SCORE: 37
ADLS_ACUITY_SCORE: 28
HEARING_DIFFICULTY_OR_DEAF: NO
ADLS_ACUITY_SCORE: 42
ADLS_ACUITY_SCORE: 43
ADLS_ACUITY_SCORE: 37
FALL_HISTORY_WITHIN_LAST_SIX_MONTHS: NO
ADLS_ACUITY_SCORE: 29
ADLS_ACUITY_SCORE: 37
CHANGE_IN_FUNCTIONAL_STATUS_SINCE_ONSET_OF_CURRENT_ILLNESS/INJURY: NO
ADLS_ACUITY_SCORE: 37
ADLS_ACUITY_SCORE: 43
DRESSING/BATHING_DIFFICULTY: NO
CONCENTRATING,_REMEMBERING_OR_MAKING_DECISIONS_DIFFICULTY: NO
ADLS_ACUITY_SCORE: 35
ADLS_ACUITY_SCORE: 42
DIFFICULTY_EATING/SWALLOWING: NO
WEAR_GLASSES_OR_BLIND: YES
DOING_ERRANDS_INDEPENDENTLY_DIFFICULTY: NO
ADLS_ACUITY_SCORE: 37
ADLS_ACUITY_SCORE: 43
TOILETING_ISSUES: NO
ADLS_ACUITY_SCORE: 43
WALKING_OR_CLIMBING_STAIRS_DIFFICULTY: NO
ADLS_ACUITY_SCORE: 28
ADLS_ACUITY_SCORE: 43
ADLS_ACUITY_SCORE: 42
ADLS_ACUITY_SCORE: 37

## 2024-06-14 NOTE — CONSULTS
Neurology Consult Note  [facility]       [2024]                                                                                       Admission Date: 2024  Hospital Day: 2      Patient: Maribell Mendoza      : 1950  MRN:  [id number]     CC:      Chief Complaint   Patient presents with    Altered Mental Status       Consult Request:  Referring Provider:  Marisol Rocha MD, MD  Primary Care Provider:  Osiris Fernandes MD        HPI:  Maribell Mendoza is a 74 year old yo female admitted for erratic behavior.  Patient had a trip to [facility] 2 weeks ago with her family and her son did not notice any abnormal behaviors on the trip.  After the trip, when patient was in [place] her son called her from [place] and he felt that she had unusual behavior with significant paranoia as patient was talking about artificial intelligence spying on her.  Her son asked family members to check on her and they noted that behavior was erratic and she was confused.  Her home was very disorganized.  Her neighbors also noted change in her typical behavior.  Patient was very belligerent when her ex- checked on her.  Her son flew from [place] and noted that she was not sleeping at night.  He brought her to the ER for further evaluation.  Patient is very tangential in her thinking.  She admits to have significant stress in her life.  She starts crying when she thinks about her cat who  a year ago.  She denies any episodes of dream enactment behavior.  Patient reports that she may see things that are not there.          A complete review of symptoms was performed including vascular, infectious, cardiovascular, pulmonary, gastrointestinal, endocrinological, hematologic, dermatologic, musculoskeletal, and neurological. All were normal except as above.    PAST MEDICAL HISTORY:  ALLERGIES:   Allergies   Allergen Reactions    Alendronate Other (See Comments)     PN:  SWALLOWING     Sulfa Antibiotics Hives     PN: HIVES     Tobacco:    History   Smoking Status    Never   Smokeless Tobacco    Never     Alcohol:  Social History    Substance and Sexual Activity      Alcohol use: Not on file    MEDICATIONS:       CURRENTLY SCHEDULED MEDICATIONS   Current Facility-Administered Medications   Medication Dose Route Frequency Provider Last Rate Last Admin    atorvastatin (LIPITOR) tablet 20 mg  20 mg Oral Daily Bladimir Rodriguez MD   20 mg at 06/14/24 1050    levothyroxine (SYNTHROID/LEVOTHROID) tablet 88 mcg  88 mcg Oral Daily Bladimir Rodriguez MD   88 mcg at 06/14/24 1050    sodium chloride (PF) 0.9% PF flush 3 mL  3 mL Intracatheter Q8H Marisol Rocha MD, MD              HOME MEDICATIONS  Medications Prior to Admission   Medication Sig Dispense Refill Last Dose    atorvastatin (LIPITOR) 20 MG tablet Take 20 mg by mouth daily   6/13/2024    fish oil-omega-3 fatty acids 1000 MG capsule Take 2 g by mouth daily   Unknown    glucosamine-chondroitin 500-400 MG CAPS per capsule Take 1 capsule by mouth daily   Unknown    levothyroxine (SYNTHROID/LEVOTHROID) 88 MCG tablet Take 88 mcg by mouth daily   6/13/2024    multivitamin  with lutein (OCUVITE WITH LUTEIN) CAPS per capsule Take 1 capsule by mouth daily   Unknown    TURMERIC PO Take 1 capsule by mouth daily   Unknown     MEDICAL HISTORY  No past medical history on file.  SURGICAL HISTORY  No past surgical history on file.  FAMILY HISTORY    No family history on file.  SOCIAL HISTORY  Social History     Socioeconomic History    Marital status:    Tobacco Use    Smoking status: Never    Smokeless tobacco: Never   Vaping Use    Vaping status: Never Used                  Temp: 97.3  F (36.3  C)        Temp src: Oral         BP: (!) 153/58         There is no height or weight on file to calculate BMI.    Resp: 16   SpO2: 99 %   O2 Device: None (Room air)     Blood Pressure:   BP Readings from Last 3 Encounters:   06/14/24 (!) 153/58   10/03/23 136/73   09/07/23  (!) 166/82     T24 : Temp (24hrs), Av.3  F (36.3  C), Min:97  F (36.1  C), Max:97.8  F (36.6  C)       GENERAL EXAMINATION:  HEENT: PERRLA, EOMI.  Neck: Supple, No myofascial tender points.    NEUROLOGICAL EXAMINATION  Mental Status:  Patient is awake, alert, and oriented to hospital and city.  She is disoriented to time.  She could name and repeat sentences.  She could not do serial 7 subtractions.  She could remember 2 out of 5 words after 5 minutes.  Patient has tangential thinking and she jumps from 1 topic to another during the MoCA exam..      Cranial Nerves: Pupils are equal and reactive to light.Visual fields are full to confrontation. Extraocular movements are intact. There is no nystagmus in the horizontal or vertical planes.No facial sensory deficits. No facial weakness. The tongue is midline.     Motor: Muscle tone is normal. There is no pronator drift. There is no muscle atrophy. The strength is 5/5 in upper and lower extremities bilaterally. Deep tendon reflexes are 2+ and symmetric in his biceps, triceps, knees, and ankles. Plantars are flexor. .     Coordination: .Finger to nose - normal.      .  Review of Diagnostics:     I personally reviewed and interpreted the following:    Head CT w/o contrast    Result Date: 2024  EXAM: CT HEAD W/O CONTRAST LOCATION: [facility] DATE: 2024 INDICATION: AMS COMPARISON: None. TECHNIQUE: Routine CT Head without IV contrast. Multiplanar reformats. Dose reduction techniques were used. FINDINGS: INTRACRANIAL CONTENTS: No intracranial hemorrhage, extraaxial collection, or mass effect.  No CT evidence of acute infarct. Normal parenchymal attenuation. Mild generalized cerebral volume loss. Normal ventricles and sulci. VISUALIZED ORBITS/SINUSES/MASTOIDS: No intraorbital abnormality. No paranasal sinus mucosal disease. No middle ear or mastoid effusion. BONES/SOFT TISSUES: No acute abnormality. Right greater than left temporomandibular  "joint degeneration.     IMPRESSION: 1.  No acute intracranial process.        Vitamin B12: No results found for: \"B12\"      Complete Blood Count:    Recent Labs   Lab Test 06/14/24  0818 06/13/24  1747   WBC 6.3 8.2   RBC 5.00 5.18   HGB 14.4 14.9   HCT 43.4 44.0    216   LYMPH 21 23        HgA1c: No results found for: \"A1C\"     Thyroid Stimulating Hormone:   Lab Results   Component Value Date    TSH 3.00 06/13/2024        Recent Labs   Lab Test 06/14/24  0818 06/13/24  1747   WBC 6.3 8.2   HGB 14.4 14.9   MCV 87 85    216      Recent Labs   Lab Test 06/14/24  1111 06/14/24  0818 06/13/24  1747   NA  --  141 138   POTASSIUM  --  4.3 3.8   CHLORIDE  --  106 101   CO2  --  23 20*   BUN  --  19.2 27.3*   CR  --  0.89 1.01*   ANIONGAP  --  12 17*   GALINDO  --  8.9 9.9   * 68* 82     CMP:  Recent Labs   Lab 06/13/24 1747   PROTTOTAL 7.1   ALBUMIN 4.1   ALKPHOS 61   AST 41   ALT 33   BILITOTAL 1.3*   LIPASE 63*       _____________________________________________________________________________  _____________________________________________________________________________          ASSESSMENT     Altered mental status: Patient has significant behavioral changes for the last 2 weeks.  There is significant tangential thinking and probability of hallucinations.  There are no significant metabolic disturbances and normal urinalysis.  Her drug screen was negative.  The differential diagnosis would include paranoid psychosis versus possibility of autoimmune encephalitis.        RECOMMENDATIONS   MRI of the brain with and without gadolinium  Autoimmune encephalitis panel.  Psychiatry evaluation.         Total consult time 80 minutes with the time spent before, during and after the visit.    Attila Newberry MD    [facility]    "

## 2024-06-14 NOTE — PLAN OF CARE
Maribell PLEITEZ Reji  June 13, 2024  Plan of Care Hand-off Note     Patient Care Path: medical    Plan for Care:   Patient is having some sort of psychotic episode with delusions and paranoia. Patient's behavior is erratic, outside of her baseline, she is unable to reason, track conversation normally participate in daily life activities. she is engaging in unsafe behaviors due to confusion, and paranoia. Patient will need a medical admission with a psych consult. Patient has no history of psychotic disorders.    Identified Goals and Safety Issues: Admit to medical unit with psych consult. Patient is not safe to discharge due to level of confusion, paranoia and inability to care for her safe.     Overview:  Tom Mendoza (Son)  [phone] (            Legal Status: Voluntary    Psychiatry Consult: placed        Updated  Dr. Khoury  regarding plan of care.         ALANA YuenSW

## 2024-06-14 NOTE — ED NOTES
"[facility]  ED Nurse Handoff Report    ED Chief complaint: Altered Mental Status  . ED Diagnosis:   Final diagnoses:   Acute encephalopathy       Allergies:   Allergies   Allergen Reactions    Alendronate Other (See Comments)     PN:  SWALLOWING    Sulfa Antibiotics Hives     PN: HIVES       Code Status: Full Code    Activity level - Baseline/Home:  Independent  Activity Level - Current:   assist of 1.   Lift room needed: No.   Bariatric: No   Needed: No   Isolation: No.   Infection: Not Applicable.     Respiratory status: Room air    Vital Signs (within 30 minutes):   Vitals:    06/13/24 1913 06/13/24 1921 06/13/24 2009 06/13/24 2245   BP: (!) 182/72   (!) 176/76   Pulse: 80      Resp:  18     Temp:   97.2  F (36.2  C)    TempSrc:   Temporal    SpO2: 97%          Cardiac Rhythm:  ,      Pain level:    Patient confused: Yes.   Patient Falls Risk: nonskid shoes/slippers when out of bed, arm band in place, assistive device/personal items within reach, activity supervised, and room door open.   Elimination Status: Has voided     Patient Report - Initial Complaint: Altered mental status.   Focused Assessment: Maribell Mendoza is a 74 year old female who presents with an altered mental status. Patient states that she is not in pain. Patient's son reports that the patient was fine on their vacation to [place] [employer] two weeks ago. Patient reports having \"features\" of paranoia and psychosis. Son states that the patient has been stressed with moving and uncovering old memories as well as potential AI spying. Son endorses decreased urinary output, loss of appetite, insomnia. Patient reports that she has a history of mental health concerns that she was medicated for, including depression. Patient denies a history of alcohol abuse, IV street drug abuse, marijuana abuse, cocaine abuse. She denies fevers, chills, cold symptoms. She also denies falls and recent trauma.        Abnormal Results:   Labs " Ordered and Resulted from Time of ED Arrival to Time of ED Departure   BASIC METABOLIC PANEL - Abnormal       Result Value    Sodium 138      Potassium 3.8      Chloride 101      Carbon Dioxide (CO2) 20 (*)     Anion Gap 17 (*)     Urea Nitrogen 27.3 (*)     Creatinine 1.01 (*)     GFR Estimate 58 (*)     Calcium 9.9      Glucose 82     ROUTINE UA WITH MICROSCOPIC - Abnormal    Color Urine Light Yellow      Appearance Urine Clear      Glucose Urine Negative      Bilirubin Urine Negative      Ketones Urine 10 (*)     Specific Gravity Urine 1.006      Blood Urine Negative      pH Urine 5.0      Protein Albumin Urine Negative      Urobilinogen Urine Normal      Nitrite Urine Negative      Leukocyte Esterase Urine Negative      Mucus Urine Present (*)     RBC Urine 0      WBC Urine 1      Squamous Epithelials Urine <1     HEPATIC FUNCTION PANEL - Abnormal    Protein Total 7.1      Albumin 4.1      Bilirubin Total 1.3 (*)     Alkaline Phosphatase 61      AST 41      ALT 33      Bilirubin Direct 0.24     LIPASE - Abnormal    Lipase 63 (*)    TSH WITH FREE T4 REFLEX - Normal    TSH 3.00     ETHYL ALCOHOL LEVEL - Normal    Alcohol ethyl <0.01     AMMONIA - Normal    Ammonia 20     URINE DRUG SCREEN PANEL - Normal    Amphetamines Urine Screen Negative      Barbituates Urine Screen Negative      Benzodiazepine Urine Screen Negative      Cannabinoids Urine Screen Negative      Cocaine Urine Screen Negative      Fentanyl Qual Urine Screen Negative      Opiates Urine Screen Negative      PCP Urine Screen Negative     CBC WITH PLATELETS AND DIFFERENTIAL    WBC Count 8.2      RBC Count 5.18      Hemoglobin 14.9      Hematocrit 44.0      MCV 85      MCH 28.8      MCHC 33.9      RDW 14.0      Platelet Count 216      % Neutrophils 66      % Lymphocytes 23      % Monocytes 10      % Eosinophils 1      % Basophils 1      % Immature Granulocytes 0      NRBCs per 100 WBC 0      Absolute Neutrophils 5.4      Absolute Lymphocytes 1.9       Absolute Monocytes 0.8      Absolute Eosinophils 0.1      Absolute Basophils 0.0      Absolute Immature Granulocytes 0.0      Absolute NRBCs 0.0          Head CT w/o contrast   Final Result   IMPRESSION:   1.  No acute intracranial process.          Treatments provided: see MAR  Family Comments: son was at bedside, has since left  OBS brochure/video discussed/provided to patient:  N/A  ED Medications:   Medications   OLANZapine zydis (zyPREXA) ODT tab 10 mg (10 mg Oral $Given 6/13/24 1830)   midazolam (VERSED) injection 5 mg (5 mg Intramuscular $Given 6/13/24 2042)   LORazepam (ATIVAN) tablet 2 mg (2 mg Oral Not Given 6/13/24 2204)   lactated ringers BOLUS 1,000 mL (1,000 mLs Intravenous $New Bag 6/13/24 2127)       Drips infusing:  Yes  For the majority of the shift this patient was Yellow.   Interventions performed were Sitter at bedside, verbal redirection and re.    Sepsis treatment initiated: No    Cares/treatment/interventions/medications to be completed following ED care:   RECEIVING UNIT ED HANDOFF REVIEW    Above ED Nurse Handoff Report was reviewed: Yes  Reviewed by: Katarzyna Oneal RN on June 14, 2024 at 1:14 PM   KELTON Iglesias called the ED to inform them the note was read: Yes      ED Nurse Name: Jessica Medel RN  11:44 PM

## 2024-06-14 NOTE — CONSULTS
"Diagnostic Evaluation Consultation  Crisis Assessment    Patient Name: Maribell Mendoza  Age:  74 year old  Legal Sex: female  Gender Identity: female  Pronouns:   Race: White  Ethnicity: Not  or   Language: English      Patient was assessed: Virtual: Common Sensing Crisis Assessment Start Time: 2052 Crisis Assessment Stop Time: 2124  Patient location: [facility] EMERGENCY DEPT                             ED36    Referral Data and Chief Complaint  Maribell Mendoza presents to the ED with family/friends. Patient is presenting to the ED for the following concerns: Paranoia.   Factors that make the mental health crisis life threatening or complex are:  Due to patient's agitation and unwillingness to cooperate with medical interventions patient was given an IM injection.   met briefly with patient who was too sleepy to fully participate in the assessment.   spoke via telemedicine video with son at patient's bedside.  Per son patient and son's family and child were in [place] 2 weeks ago tomorrow.  He reports that everything was great and they had a fantastic time in [place].  He describes his as \"sharp as attack\".  Patient flew home to [place] and son and family flew home to [place].  Son then went to a [employer] and [place] for work while abroad he became concerned that his mother was not returning his calls or texts which is out of the typical for her.  He then received a call from his mom's neighbor noting some abnormal behavior.  Son describes his mom as very routine, the neighbor noticed little things that separate would not be noticeable.  Some examples were that patient left regarding scans out and collected them at 1 in the morning, patient always turns off the lights outside at night and patient was turning and keeping the light on all night.  She was not answering the door and made several odd comments to the neighbor.  Patient's son began getting worried about her and sent family " "members nearby check on her.  Per son patient is a very polite, funny and liam person whom he never hears swear at people.  Patient's close cousin arrived with police and other family members to do a welfare check on patient and patient yelled at him  to \"fuck off\". Son took a flight arrived at patient's house at 1 AM this morning.  Patient was awake, her house is a mess, there were 2 bathtubs full of water with the faucets on and running, there were trash cans in random locations and shoes in front of doorways.  Patient had cut several cords in her home and unplugged all the cords.  She was talking about AI watching her taking over everything.  She would not sleep in her bed and signed out her to rest on the floor for a while before calling the crisis line with the recommendation of bringing her to urgent care.  Son brought her to urgent care and she would not get out of the car he attempted for 2 hours before bringing her to the emergency department.  Patient required EMS to encourage her out of the vehicle and into this emergency department.  Patient was yelling and screaming making nonsensical statements making paranoid statements.      Informed Consent and Assessment Methods  Explained the crisis assessment process, including applicable information disclosures and limits to confidentiality, assessed understanding of the process, and obtained consent to proceed with the assessment.  Assessment methods included conducting a formal interview with patient, review of medical records, collaboration with medical staff, and obtaining relevant collateral information from family and community providers when available.  : unable to complete     Patient response to interventions: no evidence of understanding  Coping skills were attempted to reduce the crisis:  Son called crisis line earlier today     History of the Crisis   Per son has history of psychosis does not take any mental health medications, has no history of " "inpatient hospitalization, therapy or any other mental health treatment.  She has been off thyroid medicatiosn for a week.  He is unsure if patient has a history of suicide attempts    Brief Psychosocial History  Family:  , Children yes (One adult son)  Support System:  Children  Employment Status:  retired  Source of Income:  none  Financial Environmental Concerns:  none  Current Hobbies:     Barriers in Personal Life:       Significant Clinical History  Current Anxiety Symptoms:  anxious, excessive worry  Current Depression/Trauma:   (Son denies)  Current Somatic Symptoms:  anxious, excessive worry  Current Psychosis/Thought Disturbance:  forgetful, inattentive, hyperactive, hostile/aggressive, elated mood, auditory hallucinations, visual hallucinations, flight of ideas  Current Eating Symptoms:   (Patient does not appear to be eating or drinking well the last several days)  Chemical Use History:  Alcohol: None  Benzodiazepines: None  Opiates: None  Cocaine: None  Marijuana: None   Past diagnosis:  Depression  Family history:  No known history of mental health or chemical health concerns  Past treatment:  No known formal treatment attempts  Details of most recent treatment:     Other relevant history:          Collateral Information  Is there collateral information: Yes (Patient's son helped complete this assessment collateral is throughout the assessment)     Collateral information name, relationship, phone number:  Tom Mendoza (Son)  [phone]    What happened today: 2 weeks ago tomorrow, they were on a vaction in [facility], Patient was \"sharp as a tack\" and we had a wonderful time. they flew home (son lives in [place] and mom lives in [place]). son knew somthing was wrong when mom did not return any texts or calls. Neighbor reached out to son, worried about her behavior. The neighbor noticed little things as Patient is very regiement. mom did not turn off the lights outside. Patient sent neighbor some " "criptic texts, and made a comment to neighbor that seems out of the [facility]. two bathtubs in the house were full of water and the facets were on. the house was a mess, the lights were on at 1am. She set up trashcans and shoes in front of doorways. I dont think she was drinking any water. She reports \"AI\" is everywhere they are watching us.     What is different about patient's functioning:       Concern about alcohol/drug use:      What do you think the patient needs:      Has patient made comments about wanting to kill themselves/others: no    If d/c is recommended, can they take part in safety/aftercare planning:  no    Additional collateral information:        Risk Assessment  [place] Suicide Severity Rating Scale Full Clinical Version:  Suicidal Ideation  Q1 Wish to be Dead (Lifetime): No  Q2 Non-Specific Active Suicidal Thoughts (Lifetime): No     Suicidal Behavior (Lifetime)  Actual Attempt (Lifetime): No  Has subject engaged in non-suicidal self-injurious behavior? (Lifetime): No  Interrupted Attempts (Lifetime): No  Aborted or Self-Interrupted Attempt (Lifetime): No  Preparatory Acts or Behavior (Lifetime): No    [place] Suicide Severity Rating Scale Recent:   Suicidal Ideation (Recent)  Q1 Wished to be Dead (Past Month): no  Q2 Suicidal Thoughts (Past Month): no  Level of Risk per Screen: no risks indicated  Intensity of Ideation (Recent)  Frequency (Past 1 Month):  (Not applicable)  Duration (Past 1 Month):  (Not applicable)  Controllability (Past 1 Month):  (Not applicable)  Deterrents (Past 1 Month):  (Not applicable)  Reasons for Ideation (Past 1 Month):  (Not applicable)  Suicidal Behavior (Recent)  Actual Attempt (Past 3 Months): No  Total Number of Actual Attempts (Past 3 Months): 0  Has subject engaged in non-suicidal self-injurious behavior? (Past 3 Months): No  Interrupted Attempts (Past 3 Months): No  Total Number of Interrupted Attempts (Past 3 Months): 0  Aborted or Self-Interrupted Attempt " (Past 3 Months): No  Total Number of Aborted or Self-Interrupted Attempts (Past 3 Months): 0  Preparatory Acts or Behavior (Past 3 Months): No  Total Number of Preparatory Acts (Past 3 Months): 0    Environmental or Psychosocial Events: other life stressors  Protective Factors: Protective Factors: strong bond to family unit, community support, or employment, responsibilities and duties to others, including pets and children    Does the patient have thoughts of harming others? Feels Like Hurting Others: no  Previous Attempt to Hurt Others: no  Current presentation: Confused, Irritable, Verbal Threats  Is the patient engaging in sexually inappropriate behavior?: no  Duty to warn initiated: no    Is the patient engaging in sexually inappropriate behavior?  no        Mental Status Exam   Affect: Labile  Appearance: Disheveled  Attention Span/Concentration: Inattentive  Eye Contact: Variable    Fund of Knowledge: Delayed   Language /Speech Content: Expressive Speech  Language /Speech Volume:  (Varied)  Language /Speech Rate/Productions: Pressured  Recent Memory: Poor  Remote Memory: Poor  Mood: Anxious, Angry, Irritable  Orientation to Person: Yes   Orientation to Place: No  Orientation to Time of Day: No  Orientation to Date: No     Situation (Do they understand why they are here?): No  Psychomotor Behavior: Agitated  Thought Content: Paranoia, Delusions  Thought Form: Flight of Ideas, Paranoia     Mini-Cog Assessment  Number of Words Recalled:    Clock-Drawing Test:     Three Item Recall:    Mini-Cog Total Score:       Medication  Psychotropic medications:   Medication Orders - Psychiatric (From admission, onward)      Start     Dose/Rate Route Frequency Ordered Stop    06/13/24 2030  LORazepam (ATIVAN) tablet 2 mg         2 mg Oral ONCE 06/13/24 2026               Current Care Team  Patient Care Team:  Osiris Fernandes as PCP - General (Family Medicine)    Diagnosis  Patient Active Problem List   Diagnosis Code     Chronic pain of left knee M25.562, G89.29    Psychosis (H) F29       Primary Problem This Admission  Active Hospital Problems    *Psychosis (H)        Clinical Summary and Substantiation of Recommendations   Patient is having some sort of psychotic episode with delusions and paranoia. Patient's behavior is erratic, outside of her baseline, she is unable to reason, track conversation normally participate in daily life activities. she is engaging in unsafe behaviors due to confusion, and paranoia. Patient will need a medical admission with a psych consult. Patient has no history of psychotic disorders.        Patient coping skills attempted to reduce the crisis:  Son called crisis line earlier today    Disposition  Recommended disposition: Medical Admission        Reviewed case and recommendations with attending provider. Attending Name: Dr. Khoury       Attending concurs with disposition: yes       Patient and/or validated legal guardian concurs with disposition:   no       Final disposition:  medical    Legal status on admission:      Assessment Details   Total duration spent with the patient: 30 min     CPT code(s) utilized: 07037 - Psychotherapy for Crisis - 60 (30-74*) min    ANNI Yuen, Psychotherapist  [facility] - Triage & Transition Services  Callback: [phone]

## 2024-06-14 NOTE — PROGRESS NOTES
[facility]    Hospitalist Progress Note      Assessment & Plan   Maribell Mendoza is a 74 year old female w a history of hyperlipidemia, osteoporosis, hypothyroidism and a small goiter due to Hashimoto's thyroiditis who was brought in by her son due to altered mental status.      #Encephalopathy. Paranoia with delusions.  Anxiety: The patient is highly functional at baseline and lives independently in a duplex.  She is very meticulous and organized.  Her son lives in [place] and 2 weeks ago they went to [place] for 5 days in [facility].  He did not notice anything different about her at that time.  She was her usual self and interacting quite normally with his family.  Interestingly, in the last 1 to 2 months there have been plans for her to move out of her duplex down to [place] to live with her son and his family.    -About 1.5 weeks ago, after coming back from [place], patient was making paranoid statements about artificial intelligence spying on her.  Her son at that time tried to reassure her.  She is reportedly also been anxious/paranoid with her friends/ over the last week.  Her neighbor had also noticed that she was not following her usual routine.  She was leaving her outside lights on all day.  Her neighbor would noticed that her anterior lights were on at 2 in the morning.  Her neighbor relayed this to her son who then came in to see her earlier this week.  -Her son notes that her duplex is usually very meticulously clean.  She had been boxing things in anticipation of the move which apparently had been ramped up over the last couple of weeks.  He stayed with her and she was continuing to make very paranoid statements.  She thought that she was being spied on.  When he arrived to the [facility] the faucets were all also running.  Son notes that she is also not been sleeping well since he has been with her.  -In the ER, patient paranoid and restless.  Her CBC and BMP are  "unremarkable.  CT head without acute abnormality.  UA is without indication of infection.  She does not have focal deficits but has continued to be paranoid and anxious.  -On 6/14, patient is sitting in the chair.  She is only willing to answer some questions.  She defers most questions to her son who is at bedside.  She is calm but continues to display elements of paranoia.  -Maintain one-to-one sitter.  If patient were to try to leave would need to be placed on a 72-hour hold.  -At this point, I do not see any clear infection or focal neurologic issue as etiology of her symptoms.  I do wonder if the recent trip to [place], stress of ready to move to [place] and triggered this episode.  -We will await neurology recommendations.  Psychiatry will need to continue to follow.  High suspicion that this is a primary psychiatric issue and may need inpatient psych.     #History of hypothyroidism: Continue home LT4  #History of HL: Continue statin    DVT Prophylaxis: Pneumatic Compression Devices  Dispo: Pending neuro and psych assessments.  Suspect may need inpatient psych  Code Status:  Full Code.    Extensive discussion with son who is at bedside on 6/14.    Bladimir Rodriguez MD    Interval History   Patient denies any pain.  No headache.  No nausea or vomiting.  Did have some breakfast.  She still refuses to answer some questions.  She does not want to talk to psychiatry.  She defers much of the questioning to her son.  When asked why she is in the hospital she pointed him in 6 states because he brought me.\"    -Data reviewed today: I reviewed all new labs and imaging results over the last 24 hours. I personally reviewed     Physical Exam   Temp: 97  F (36.1  C) Temp src: Temporal BP: (!) 146/71 Pulse: 66   Resp: 16 SpO2: 98 % O2 Device: None (Room air)    There were no vitals filed for this visit.  Vital Signs with Ranges  Temp:  [97  F (36.1  C)-97.2  F (36.2  C)] 97  F (36.1  C)  Pulse:  [65-80] 66  Resp:  [16-18] 16  BP: " (146-182)/(71-79) 146/71  SpO2:  [95 %-98 %] 98 %  No intake/output data recorded.    Constitutional: Paranoid but generally calm.  Sitting in the chair.  Not in distress.  HEENT: Normocephalic. MMM, No elevation of JVD noted.   Respiratory: Nl WOB, Clear bilaterally, No wheezes or crackles  Cardiovascular: Regular, no murmur  GI: BS+, NT, ND  Skin/Integumen: WWP, no rash. No edema  Neuro: CNII-XII intact. Moves all extremities.  No tremors noted.  She is alert and oriented but paranoid.    Medications   Current Facility-Administered Medications   Medication Dose Route Frequency Provider Last Rate Last Admin    sodium chloride 0.9 % infusion   Intravenous Continuous Marisol Rocha MD,  mL/hr at 06/14/24 1010 New Bag at 06/14/24 1010     Current Facility-Administered Medications   Medication Dose Route Frequency Provider Last Rate Last Admin    atorvastatin (LIPITOR) tablet 20 mg  20 mg Oral Daily Bladimir Rodriguez MD   20 mg at 06/14/24 1050    levothyroxine (SYNTHROID/LEVOTHROID) tablet 88 mcg  88 mcg Oral Daily Bladimir Rodriguez MD   88 mcg at 06/14/24 1050    sodium chloride (PF) 0.9% PF flush 3 mL  3 mL Intracatheter Q8H Marisol Rocha MD, MD           Data   Recent Labs   Lab 06/14/24  1111 06/14/24  0818 06/13/24  1747   WBC  --  6.3 8.2   HGB  --  14.4 14.9   MCV  --  87 85   PLT  --  189 216   NA  --  141 138   POTASSIUM  --  4.3 3.8   CHLORIDE  --  106 101   CO2  --  23 20*   BUN  --  19.2 27.3*   CR  --  0.89 1.01*   ANIONGAP  --  12 17*   GALINDO  --  8.9 9.9   * 68* 82   ALBUMIN  --   --  4.1   PROTTOTAL  --   --  7.1   BILITOTAL  --   --  1.3*   ALKPHOS  --   --  61   ALT  --   --  33   AST  --   --  41   LIPASE  --   --  63*       Recent Results (from the past 24 hour(s))   Head CT w/o contrast    Narrative    EXAM: CT HEAD W/O CONTRAST  LOCATION: [facility]  DATE: 6/13/2024    INDICATION: AMS  COMPARISON: None.  TECHNIQUE: Routine CT Head without IV contrast. Multiplanar  reformats. Dose reduction techniques were used.    FINDINGS:  INTRACRANIAL CONTENTS: No intracranial hemorrhage, extraaxial collection, or mass effect.  No CT evidence of acute infarct. Normal parenchymal attenuation. Mild generalized cerebral volume loss. Normal ventricles and sulci.     VISUALIZED ORBITS/SINUSES/MASTOIDS: No intraorbital abnormality. No paranasal sinus mucosal disease. No middle ear or mastoid effusion.    BONES/SOFT TISSUES: No acute abnormality. 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      Impression    IMPRESSION:  1.  No acute intracranial process.

## 2024-06-14 NOTE — H&P
[facility]    History and Physical - Hospitalist Service       Date of Admission:  6/13/2024    Assessment & Plan      Maribell Mendoza is a 74 year old female admitted on 6/13/2024. She has a history of hyperlipidemia, osteoporosis, hypothyroidism and a small goiter due to Hashimoto's thyroiditis who was brought in by her son due to altered mental status.  The patient is highly functional at baseline and lives independently.  She is very meticulous and organized.  Her son lives in [place] and 2 weeks ago they went to [place] for 5 days in [facility].  He did not notice anything different about her.  She was her usual self and interacting quite normally with his family.  After coming back on a phone call while she was here in [place] and he was in [place] he spoke about unusual things such as artificial intelligence spying on her.  Initially he attributed this to her being underlying a great deal.  However over time he got concerned and he had his cousin check on her on Monday who felt that she was quite confused and noticed that the home was disorganized.  The neighbor also called him up and told him that normally the garbage cans would be picked off the driveway however there would still be there.  Her TV would be on at 2 AM.  Normally she would turn the exterior lights off at night but there was still on.  He asked her ex- to check on her.  She was belligerent.  Subsequently her son flew in from [place] and noticed that she has not been sleeping.  He noticed the house was a mess and that the bathrooms were full of water.  She was talking about nonsensical things such as nutrition and her past and very concerned about safety and security.  In the ER she was noted to be somewhat paranoid and restless.  She got Zyprexa and Versed.  She is quite sleepy and I am unable to engage her in any conversation.    Acute encephalopathy with paranoia.  -Etiology unclear.  Her urinalysis and CT head  showed no acute process.  -I doubt there is a metabolic or infectious etiology for her encephalopathy.  -She will require further workup, ? MRI brain, and neurology and psychiatry consults.  She has no prior psychiatry history.  -Will have a sitter for her as she is at risk for falls and quite altered prior to medications administered.    2.  History of hypothyroidism.  -Resume home meds once reconciled        Diet:  ADAT.  DVT Prophylaxis: Pneumatic Compression Devices  Jerez Catheter: Not present  Lines: None     Cardiac Monitoring: None  Code Status:  Full Code.    Clinically Significant Risk Factors Present on Admission                                  # Financial/Environmental Concerns: none         Disposition Plan     Medically Ready for Discharge: Anticipated in 2-4 Days           Marisol Rocha MD  Hospitalist Service  [facility]  Securely message with Private Outlet (more info)  Text page via [facility] Paging/Directory     ______________________________________________________________________    Chief Complaint     AMS    History is obtained from the patient and patient's son    History of Present Illness   Maribell Mendoza is a 74 year old female who has a history of hyperlipidemia, osteoporosis, hypothyroidism and a small goiter due to Hashimoto's thyroiditis who was brought in by her son due to altered mental status.  The patient is highly functional at baseline and lives independently.  She is very meticulous and organized.  Her son lives in [place] and 2 weeks ago they went to [place] for 5 days in [facility].  He did not notice anything different about her.  She was her usual self and interacting quite normally with his family.  After coming back on a phone call while she was here in [place] and he was in [place] he spoke about unusual things such as artificial intelligence spying on her.  Initially he attributed this to her being underlying a great deal.  However over time he got  concerned and he had his cousin check on her on Monday who felt that she was quite confused and noticed that the home was disorganized.  The neighbor also called him up and told him that normally the garbage cans would be picked off the driveway however there would still be there.  Her TV would be on at 2 AM.  Normally she would turn the exterior lights off at night but there was still on.  He asked her ex- to check on her.  She was belligerent.  Subsequently her son flew in from [place] and noticed that she has not been sleeping.  He noticed the house was a mess and that the bathrooms were full of water.  She was talking about nonsensical things such as nutrition and her past and very concerned about safety and security.  In the ER she was noted to be somewhat paranoid and restless.  She got Zyprexa and Versed.  She is quite sleepy and I am unable to engage her in any conversation.      Past Medical History    No past medical history on file.    Past Surgical History   No past surgical history on file.    Prior to Admission Medications   Prior to Admission Medications   Prescriptions Last Dose Informant Patient Reported? Taking?   DULoxetine (CYMBALTA) 20 MG capsule   No No   Sig: Take 1 capsule (20 mg) by mouth daily   Patient not taking: Reported on 10/3/2023   atorvastatin (LIPITOR) 20 MG tablet   Yes No   Sig: Take 20 mg by mouth daily   diclofenac (VOLTAREN) 1 % topical gel   No No   Sig: Apply 2 g topically 4 times daily as needed for moderate pain   fish oil-omega-3 fatty acids 1000 MG capsule   Yes No   Sig: Take 2 g by mouth daily   glucosamine-chondroitin 500-400 MG CAPS per capsule   Yes No   Sig: Take 1 capsule by mouth daily   levothyroxine (SYNTHROID/LEVOTHROID) 88 MCG tablet   Yes No   Sig: Take 88 mcg by mouth daily   multivitamin  with lutein (OCUVITE WITH LUTEIN) CAPS per capsule   Yes No   Sig: Take 1 capsule by mouth daily      Facility-Administered Medications: None        Review of  Systems    Review of systems not obtained due to patient factors - confusion    Social History   I have reviewed this patient's social history and updated it with pertinent information if needed.  Social History     Tobacco Use    Smoking status: Never    Smokeless tobacco: Never   Vaping Use    Vaping status: Never Used         Family History       Reviewed.      Allergies   Allergies   Allergen Reactions    Alendronate Other (See Comments)     PN:  SWALLOWING    Sulfa Antibiotics Hives     PN: HIVES        Physical Exam   Vital Signs: Temp: 97.2  F (36.2  C) Temp src: Temporal BP: (!) 176/76 Pulse: 80   Resp: 18 SpO2: 97 %      Weight: 0 lbs 0 oz    Gen - sleepy, keeps eyes closed. Follows commands though such as moving her limbs.  Lungs - CTA B.  Heart - RR,S1+S2 nml, no m/g/r.  Abd - soft, NT, ND, + BS.  Ext - no edema.  Neuro - unable to check CN II-XII, though pupils are reactive. She moves the neck without difficulty, no photophobia noted. Moves limbs spontaneously.    Medical Decision Making       80 MINUTES SPENT BY ME on the date of service doing chart review, history, exam, documentation & further activities per the note.      Data     I have personally reviewed the following data over the past 24 hrs:    8.2  \   14.9   / 216     138 101 27.3 (H) /  82   3.8 20 (L) 1.01 (H) \     ALT: 33 AST: 41 AP: 61 TBILI: 1.3 (H)   ALB: 4.1 TOT PROTEIN: 7.1 LIPASE: 63 (H)     TSH: 3.00 T4: N/A A1C: N/A       Imaging results reviewed over the past 24 hrs:   Recent Results (from the past 24 hour(s))   Head CT w/o contrast    Narrative    EXAM: CT HEAD W/O CONTRAST  LOCATION: [facility]  DATE: 6/13/2024    INDICATION: AMS  COMPARISON: None.  TECHNIQUE: Routine CT Head without IV contrast. Multiplanar reformats. Dose reduction techniques were used.    FINDINGS:  INTRACRANIAL CONTENTS: No intracranial hemorrhage, extraaxial collection, or mass effect.  No CT evidence of acute infarct. Normal  parenchymal attenuation. Mild generalized cerebral volume loss. Normal ventricles and sulci.     VISUALIZED ORBITS/SINUSES/MASTOIDS: No intraorbital abnormality. No paranasal sinus mucosal disease. No middle ear or mastoid effusion.    BONES/SOFT TISSUES: No acute abnormality. 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      Impression    IMPRESSION:  1.  No acute intracranial process.

## 2024-06-14 NOTE — PLAN OF CARE
"Goal Outcome Evaluation:  Pertinent assessments: Alert to self only. VSS. Denied pain. Tearful & crying. Speech incoherent & illogical. Disorganized thoughts. Blaming son to bring her to hospital. NS infusing at 100 in LFA PIV. SBA. Sitter & bedside for safety.  Major Shift Events: Admitted to unit at 1435  Treatment Plan: IVF, Pschy & Neurology consulted      Plan of Care Reviewed With: patient  Overall Patient Progress: no changeOverall Patient Progress: no change  Outcome Evaluation: Disoriented x3. Sitter at bedside      Problem: Adult Inpatient Plan of Care  Goal: Plan of Care Review  Description: The Plan of Care Review/Shift note should be completed every shift.  The Outcome Evaluation is a brief statement about your assessment that the patient is improving, declining, or no change.  This information will be displayed automatically on your shift  note.  Outcome: Not Progressing  Flowsheets (Taken 6/14/2024 4660)  Outcome Evaluation: Disoriented x3. Sitter at bedside  Plan of Care Reviewed With: patient  Overall Patient Progress: no change  Goal: Patient-Specific Goal (Individualized)  Description: You can add care plan individualizations to a care plan. Examples of Individualization might be:  \"Parent requests to be called daily at 9am for status\", \"I have a hard time hearing out of my right ear\", or \"Do not touch me to wake me up as it startles  me\".  Outcome: Not Progressing  Goal: Absence of Hospital-Acquired Illness or Injury  Outcome: Not Progressing  Goal: Optimal Comfort and Wellbeing  Outcome: Not Progressing  Goal: Readiness for Transition of Care  Outcome: Not Progressing     Problem: Electrolyte Imbalance  Goal: Electrolyte Balance  Outcome: Not Progressing     Problem: Fatigue  Goal: Improved Activity Tolerance  Outcome: Not Progressing     "

## 2024-06-14 NOTE — PHARMACY-ADMISSION MEDICATION HISTORY
Pharmacist Admission Medication History    Admission medication history is complete. The information provided in this note is only as accurate as the sources available at the time of the update.    Information Source(s): Patient and CareEverywhere/SureScripts via in-person    Pertinent Information:      Changes made to PTA medication list:  Added: Turmeric  Deleted: Voltaren gel & Cymbalta  Changed: None    Allergies reviewed with patient and updates made in EHR: yes    Medication History Completed By: Mitzi Albert PharmD 6/14/2024 9:00 AM    PTA Med List   Medication Sig Last Dose    atorvastatin (LIPITOR) 20 MG tablet Take 20 mg by mouth daily 6/13/2024    fish oil-omega-3 fatty acids 1000 MG capsule Take 2 g by mouth daily Unknown    glucosamine-chondroitin 500-400 MG CAPS per capsule Take 1 capsule by mouth daily Unknown    levothyroxine (SYNTHROID/LEVOTHROID) 88 MCG tablet Take 88 mcg by mouth daily 6/13/2024    multivitamin  with lutein (OCUVITE WITH LUTEIN) CAPS per capsule Take 1 capsule by mouth daily Unknown    TURMERIC PO Take 1 capsule by mouth daily Unknown

## 2024-06-14 NOTE — ED NOTES
Writer trying to explain to patient the importance of getting a urine sample to check for infection. PT adamantly refusing. Pt was up to bedside commode with assistx2 and refused to urinate. Pt becoming more agitated and angry. MD ordered oral ativan, which pt put in her mouth then threw across the room. Pills retrieved and discarded with witness. IM Versed given. Urine sample eventually obtained via catheterization.

## 2024-06-15 PROCEDURE — 250N000013 HC RX MED GY IP 250 OP 250 PS 637: Performed by: PSYCHIATRY & NEUROLOGY

## 2024-06-15 PROCEDURE — 250N000013 HC RX MED GY IP 250 OP 250 PS 637: Performed by: HOSPITALIST

## 2024-06-15 PROCEDURE — 250N000013 HC RX MED GY IP 250 OP 250 PS 637: Performed by: INTERNAL MEDICINE

## 2024-06-15 PROCEDURE — 250N000011 HC RX IP 250 OP 636: Mod: JZ | Performed by: INTERNAL MEDICINE

## 2024-06-15 PROCEDURE — 120N000001 HC R&B MED SURG/OB

## 2024-06-15 PROCEDURE — 99232 SBSQ HOSP IP/OBS MODERATE 35: CPT | Performed by: HOSPITALIST

## 2024-06-15 RX ORDER — HALOPERIDOL 5 MG/ML
2 INJECTION INTRAMUSCULAR EVERY 6 HOURS PRN
Status: DISCONTINUED | OUTPATIENT
Start: 2024-06-15 | End: 2024-06-20 | Stop reason: HOSPADM

## 2024-06-15 RX ORDER — OLANZAPINE 5 MG/1
5 TABLET ORAL AT BEDTIME
Status: DISCONTINUED | OUTPATIENT
Start: 2024-06-15 | End: 2024-06-18 | Stop reason: DRUGHIGH

## 2024-06-15 RX ORDER — MIRTAZAPINE 7.5 MG/1
7.5-15 TABLET, FILM COATED ORAL
Status: DISCONTINUED | OUTPATIENT
Start: 2024-06-15 | End: 2024-06-20 | Stop reason: HOSPADM

## 2024-06-15 RX ORDER — OLANZAPINE 2.5 MG/1
2.5 TABLET, FILM COATED ORAL 2 TIMES DAILY PRN
Status: DISCONTINUED | OUTPATIENT
Start: 2024-06-15 | End: 2024-06-20 | Stop reason: HOSPADM

## 2024-06-15 RX ADMIN — OLANZAPINE 2.5 MG: 2.5 TABLET, FILM COATED ORAL at 10:40

## 2024-06-15 RX ADMIN — ATORVASTATIN CALCIUM 20 MG: 20 TABLET, FILM COATED ORAL at 10:40

## 2024-06-15 RX ADMIN — HALOPERIDOL LACTATE 2 MG: 5 INJECTION, SOLUTION INTRAMUSCULAR at 02:41

## 2024-06-15 RX ADMIN — LEVOTHYROXINE SODIUM 88 MCG: 0.09 TABLET ORAL at 10:40

## 2024-06-15 RX ADMIN — OLANZAPINE 5 MG: 5 TABLET, FILM COATED ORAL at 21:17

## 2024-06-15 ASSESSMENT — ACTIVITIES OF DAILY LIVING (ADL)
ADLS_ACUITY_SCORE: 28
ADLS_ACUITY_SCORE: 33
ADLS_ACUITY_SCORE: 33
ADLS_ACUITY_SCORE: 29
ADLS_ACUITY_SCORE: 38
ADLS_ACUITY_SCORE: 29
ADLS_ACUITY_SCORE: 28
ADLS_ACUITY_SCORE: 29
ADLS_ACUITY_SCORE: 33
ADLS_ACUITY_SCORE: 38
ADLS_ACUITY_SCORE: 28
ADLS_ACUITY_SCORE: 29
ADLS_ACUITY_SCORE: 28
ADLS_ACUITY_SCORE: 33
ADLS_ACUITY_SCORE: 29
ADLS_ACUITY_SCORE: 33
ADLS_ACUITY_SCORE: 29
ADLS_ACUITY_SCORE: 29
ADLS_ACUITY_SCORE: 33

## 2024-06-15 NOTE — PLAN OF CARE
"Goal Outcome Evaluation:  Pertinent assessments: Alert to self. Disoriented x3, Very talkative. Speech incoherent. Disorganized thought. Suspicious/paranoid of staff/cares. Refused AM but took after her son arrived. Stated, \"I trusted you but not these medication.\" Asked her son, \"Are they going electrocute me?\"  IVF D/C'd. PIV is SL. Ambulated to hallway with son multiple times. PRN Zyprexa given. Reoriented  & redirected as needed. Sitter & son at bedside.     Major Shift Events: PRN Zyprexa     Treatment Plan: ymptom management.Neuro checks, Psych & neuro following.       Plan of Care Reviewed With: patient  Overall Patient Progress: improvingOverall Patient Progress: improving  Problem: Adult Inpatient Plan of Care  Goal: Plan of Care Review  Description: The Plan of Care Review/Shift note should be completed every shift.  The Outcome Evaluation is a brief statement about your assessment that the patient is improving, declining, or no change.  This information will be displayed automatically on your shift  note.  Outcome: Progressing  Flowsheets (Taken 6/15/2024 1441)  Outcome Evaluation: Confused. Siiter & son at bed side  Plan of Care Reviewed With: patient  Overall Patient Progress: improving  Goal: Patient-Specific Goal (Individualized)  Description: You can add care plan individualizations to a care plan. Examples of Individualization might be:  \"Parent requests to be called daily at 9am for status\", \"I have a hard time hearing out of my right ear\", or \"Do not touch me to wake me up as it startles  me\".  Outcome: Progressing  Goal: Absence of Hospital-Acquired Illness or Injury  Outcome: Progressing  Intervention: Identify and Manage Fall Risk  Recent Flowsheet Documentation  Taken 6/15/2024 7535 by Katarzyna Oneal, RN  Safety Promotion/Fall Prevention:   activity supervised   assistive device/personal items within reach   bedside attendant  Intervention: Prevent Skin Injury  Recent Flowsheet " Documentation  Taken 6/15/2024 0829 by Katarzyna Oneal RN  Body Position: position changed independently  Intervention: Prevent and Manage VTE (Venous Thromboembolism) Risk  Recent Flowsheet Documentation  Taken 6/15/2024 0829 by Katarzyna Oneal RN  VTE Prevention/Management: compression stockings off  Intervention: Prevent Infection  Recent Flowsheet Documentation  Taken 6/15/2024 0829 by Katarzyna Oneal RN  Infection Prevention:   equipment surfaces disinfected   hand hygiene promoted   personal protective equipment utilized   single patient room provided   rest/sleep promoted  Goal: Optimal Comfort and Wellbeing  Outcome: Progressing  Goal: Readiness for Transition of Care  Outcome: Progressing     Problem: Electrolyte Imbalance  Goal: Electrolyte Balance  Outcome: Progressing     Problem: Fatigue  Goal: Improved Activity Tolerance  Outcome: Progressing  Intervention: Promote Improved Energy  Recent Flowsheet Documentation  Taken 6/15/2024 0829 by Katarzyna Oneal RN  Activity Management:   activity adjusted per tolerance   activity encouraged   ambulated in room     Problem: Fall Injury Risk  Goal: Absence of Fall and Fall-Related Injury  Outcome: Progressing  Intervention: Identify and Manage Contributors  Recent Flowsheet Documentation  Taken 6/15/2024 0829 by Katarzyna Oneal RN  Medication Review/Management: medications reviewed  Intervention: Promote Injury-Free Environment  Recent Flowsheet Documentation  Taken 6/15/2024 0829 by Katarzyna Oneal RN  Safety Promotion/Fall Prevention:   activity supervised   assistive device/personal items within reach   bedside attendant     Outcome Evaluation: Confused. Siiter & son at bed side

## 2024-06-15 NOTE — CONSULTS
"Psychiatry Initial Consultation    ID/HPI:  The patient is a 75 yo   female with no psychiatric history who is seen at request of Dr. Rodriguez for evaluation after she had been admitted for acute paranoia and agitation.  She was seen and son was interviewed for collateral information.  She had been on a trip with him to [place], flying independently.  Described as very 'sharp' and organized, a bit OCD like without any noticeable memory issues.  No insect bites, use of alcohol, recent falls or febrile illnesses.  She did have some h/o mild insomnia in the past.  After her trip , she did have nights without sleep. She had been off of her synthroid for one week per son.  Recent stressor has been packing her apt to move near son in [place].  No expressed depression.  She gave me a rambling account of being monitored by AI, and targeted for having 'a calm heart\"  feels son is in danger also.  Neighbor had seen lights on and noticed a change in her scheduled and called family.  She refuses a MRI, has been seen by Neurology service.  TSH is 3.0.  ROS negative.    Past Psychiatric History noncontributory    PMH  Hashimoto's thyroiditis.      Allergies Alendronate, sulfa    FH  Mother had dementia age 95,  101.      SH retired,  female, lived alone for over 30 years.  Retired customer .  She is full code.     VS Temp 97.3, BP 16/82, RR 16, Pulse 63 weight 75.7    MSE:  Alert, sitting up in bed, well nourished groomed female.  Social smile, shook hand, no neurological neglect.  TP show rambling paranoid delusionality.  No clear hallucinations.  Denies s/I or h/io.  Mood is anxious,denies depression.  Insight impaired.  Judgment disorganized, impaired.  Memory likely intact but not able to do full MSE.  No tremor.  Gait not observed. No reported incontinence.  Fair attention.  Able to follow simple directions.     Diagnosis:  Unspecified psychosis, rule out insomnia induced, rule out Acute " Stress Disorder, no evidence of a progressive dementia, no evidence of Acute depression. Rule out encephalopathy of unknown etiology, did have travel out of country.     Plan:  Schedule zyprexa with a prn dosing.  Remeron prn for insomnia.  Utilize prn haldol Iv bety. If refuses PO .  Son in agreement of plan.  Call prn.

## 2024-06-15 NOTE — PLAN OF CARE
"For vital signs and complete assessments, please see documentation flowsheets.     [id number]  Pertinent assessments: Pt Alert & confused.  Up Ax1-2 with gait belt. On RA. Afebrile. Elevated BP, other VSS. Denies pain, nausea, & SOB. Pt refused to perform neuro checks.Sitter at bedside overnight. PIV infusing IVF.    Major Shift Events: Pt displayed increasing agitation & paranoia at start of shift. Provider notified, po Zyprexa & IV haldol ordered. Pt refused to take po med. IV haldol given.  -At 0340 IVF stopped, Pt removed IV, new IV placed.    Treatment Plan: Symptom management.Neuro checks.MRI,encephalitis panel and Psych consult.IVF.    Bedside Nurse: Francisco Lund RN     Goal Outcome Evaluation:    Problem: Adult Inpatient Plan of Care  Goal: Plan of Care Review  Description: The Plan of Care Review/Shift note should be completed every shift.  The Outcome Evaluation is a brief statement about your assessment that the patient is improving, declining, or no change.  This information will be displayed automatically on your shift  note.  Outcome: Not Progressing  Flowsheets (Taken 6/15/2024 0610)  Outcome Evaluation: Pt confused. Elevated BP, other VSS. Denies pain on RA. Sitter at bedside  Plan of Care Reviewed With: patient  Overall Patient Progress: no change  Goal: Patient-Specific Goal (Individualized)  Description: You can add care plan individualizations to a care plan. Examples of Individualization might be:  \"Parent requests to be called daily at 9am for status\", \"I have a hard time hearing out of my right ear\", or \"Do not touch me to wake me up as it startles  me\".  Outcome: Not Progressing  Goal: Absence of Hospital-Acquired Illness or Injury  Outcome: Not Progressing  Intervention: Identify and Manage Fall Risk  Recent Flowsheet Documentation  Taken 6/15/2024 0000 by Francisco Lund, RN  Safety Promotion/Fall Prevention:   activity supervised   clutter free environment maintained   increase " visualization of patient   lighting adjusted   nonskid shoes/slippers when out of bed   room near nurse's station   room organization consistent   supervised activity   treat reversible contributory factors  Intervention: Prevent Skin Injury  Recent Flowsheet Documentation  Taken 6/15/2024 0000 by Francisco Lund RN  Body Position: position changed independently  Intervention: Prevent Infection  Recent Flowsheet Documentation  Taken 6/15/2024 0000 by Francisco Lund RN  Infection Prevention:   cohorting utilized   hand hygiene promoted   rest/sleep promoted   single patient room provided   environmental surveillance performed  Goal: Optimal Comfort and Wellbeing  Outcome: Not Progressing  Goal: Readiness for Transition of Care  Outcome: Not Progressing     Problem: Electrolyte Imbalance  Goal: Electrolyte Balance  Outcome: Not Progressing     Problem: Fatigue  Goal: Improved Activity Tolerance  Outcome: Not Progressing     Problem: Fall Injury Risk  Goal: Absence of Fall and Fall-Related Injury  Outcome: Not Progressing  Intervention: Identify and Manage Contributors  Recent Flowsheet Documentation  Taken 6/15/2024 0000 by Francisco Lund RN  Medication Review/Management: medications reviewed  Intervention: Promote Injury-Free Environment  Recent Flowsheet Documentation  Taken 6/15/2024 0000 by Francisco Lund RN  Safety Promotion/Fall Prevention:   activity supervised   clutter free environment maintained   increase visualization of patient   lighting adjusted   nonskid shoes/slippers when out of bed   room near nurse's station   room organization consistent   supervised activity   treat reversible contributory factors         Plan of Care Reviewed With: patient    Overall Patient Progress: no changeOverall Patient Progress: no change    Outcome Evaluation: Pt confused. Elevated BP, other VSS. Denies pain on RA. Sitter at bedside

## 2024-06-15 NOTE — PROGRESS NOTES
[facility]    Hospitalist Progress Note      Assessment & Plan   Maribell Mendoza is a 74 year old female w a history of hyperlipidemia, osteoporosis, hypothyroidism and a small goiter due to Hashimoto's thyroiditis who was brought in by her son due to altered mental status.       #Encephalopathy. Paranoia with delusions.  Anxiety: The patient is highly functional at baseline and lives independently in a duplex.  She is very meticulous and organized.  Her son lives in [place] and 2 weeks ago they went to [place] for 5 days in [facility].  He did not notice anything different about her at that time.  She was her usual self and interacting quite normally with his family.  Interestingly, in the last 1 to 2 months there have been plans for her to move out of her duplex down to [place] to live with her son and his family.    -About 1.5 weeks ago, after coming back from [place], patient was making paranoid statements about artificial intelligence spying on her.  Her son at that time tried to reassure her.  She is reportedly also been anxious/paranoid with her friends/ over the last week.  Her neighbor had also noticed that she was not following her usual routine.  She was leaving her outside lights on all day.  Her neighbor would noticed that her anterior lights were on at 2 in the morning.  Her neighbor relayed this to her son who then came in to see her earlier this week.  -Her son notes that her duplex is usually very meticulously clean.  She had been boxing things in anticipation of the move which apparently had been ramped up over the last couple of weeks.  He stayed with her and she was continuing to make very paranoid statements.  She thought that she was being spied on.  When he arrived to the [facility] the faucets were all also running.  Son notes that she is also not been sleeping well since he has been with her.  -In the ER, patient paranoid and restless.  Her CBC and BMP are  "unremarkable.  CT head without acute abnormality.  UA is without indication of infection.  She does not have focal deficits but has continued to be paranoid and anxious.  -Pt has continued to exhibit significant paranoia and had ongoing agitation overnight 6/14-6/15.  She is incredibly tangential and talkative.  She is not willing to have MRI of the brain as she states \"she had one already; ask my cousin .\"  She tells Levy fevers/chills. No pain.    -Maintain one-to-one sitter.  If patient were to try to leave would need to be placed on a 72-hour hold.  -At this point, I do not see any clear infection or focal neurologic issue as etiology of her symptoms.  I do wonder if the recent trip to [place], stress of getting ready to move to [place] may have triggered this episode.  -Neurology evaluated patient on 6/14.  Autoimmune encephalitis panel ordered along with MRI brain.   -Awaiting psych recommendations.   -Continue prn zyprexa 2.5 mg BID prn if willing to take PO, haldol if significant agitation and won't take PO.  Awaiting psych recommendations.      #History of hypothyroidism: Continue home LT4  #History of HL: Continue statin     DVT Prophylaxis: Pneumatic Compression Devices  Dispo: Pending neuro and psych assessments.  Suspect may need inpatient psych  Code Status:  Full Code.    Son, Antonio, updated by phone.     Bladimir Rodriguez MD    Interval History   Pt sitting in chair. Very tangential. Denies pain. Tells me she is \"100% better\" but not directly answering questions.  She is not willing to have MRI brain and when asked why she tells me \"she already had one; ask my cousin .\"  She tells me that people who \"live for other people and die for other people are not doing it right.\"  I asked her when she came to this conclusion and she tells me it was when she was in [facility].     -Data reviewed today: I reviewed all new labs and imaging results over the last 24 hours. I personally reviewed "     Physical Exam   Temp: 97.5  F (36.4  C) Temp src: Oral BP: (!) 185/70 Pulse: 67   Resp: 16 SpO2: 97 % O2 Device: None (Room air)    Vitals:    06/14/24 1916   Weight: 75.7 kg (166 lb 14.2 oz)     Vital Signs with Ranges  Temp:  [97  F (36.1  C)-97.8  F (36.6  C)] 97.5  F (36.4  C)  Pulse:  [66-74] 67  Resp:  [16-18] 16  BP: (146-185)/(54-93) 185/70  SpO2:  [96 %-100 %] 97 %  I/O last 3 completed shifts:  In: 640 [P.O.:640]  Out: -     Constitutional: Sitting in chair. Tangential.  HEENT: Normocephalic. MMM, No elevation of JVD noted.   Respiratory: Nl WOB, Clear bilaterally, No wheezes or crackles  Cardiovascular: Regular, no murmur  GI: BS+, NT, ND  Skin/Integumen: WWP, no rash. No edema  Neuro: CNII-XII intact. Moves all extremities. No tremor. Alert and oriented but tangential.      Medications   Current Facility-Administered Medications   Medication Dose Route Frequency Provider Last Rate Last Admin     Current Facility-Administered Medications   Medication Dose Route Frequency Provider Last Rate Last Admin    atorvastatin (LIPITOR) tablet 20 mg  20 mg Oral Daily Bladimir Rodriguez MD   20 mg at 06/14/24 1050    levothyroxine (SYNTHROID/LEVOTHROID) tablet 88 mcg  88 mcg Oral Daily Bladimir Rodriguez MD   88 mcg at 06/14/24 1050    sodium chloride (PF) 0.9% PF flush 3 mL  3 mL Intracatheter Q8H Marisol Rocha MD, MD   3 mL at 06/15/24 0817       Data   Recent Labs   Lab 06/14/24  1111 06/14/24  0818 06/13/24  1747   WBC  --  6.3 8.2   HGB  --  14.4 14.9   MCV  --  87 85   PLT  --  189 216   NA  --  141 138   POTASSIUM  --  4.3 3.8   CHLORIDE  --  106 101   CO2  --  23 20*   BUN  --  19.2 27.3*   CR  --  0.89 1.01*   ANIONGAP  --  12 17*   GALINDO  --  8.9 9.9   * 68* 82   ALBUMIN  --   --  4.1   PROTTOTAL  --   --  7.1   BILITOTAL  --   --  1.3*   ALKPHOS  --   --  61   ALT  --   --  33   AST  --   --  41   LIPASE  --   --  63*       No results found for this or any previous visit (from the past 24 hour(s)).

## 2024-06-15 NOTE — CONSULTS
"NUTRITION BRIEF NOTE      REASON FOR NUTRITION CONSULT:  Malnutrition screening tool (MST) total score of \"2\" with \"unsure\" answered to the question of \"have you recently lost weight without trying?\".      ASSESSMENT:  Per review of wt trending available below, wt gain when compared to last wt recording.  No current documentation of edema:  Wt Readings from Last 10 Encounters:   06/14/24 75.7 kg (166 lb 14.2 oz)   10/03/23 73 kg (161 lb)     FOLLOW UP:   This brings total MST score down to \"0\".  Will therefore chart check at LOS unless formally consulted in the interim.  Please formally consult if needs arise.    Colleen Felix RDN, LD  Clinical Dietitian  3rd floor/ICU: [phone]  All other floors: [phone]  Weekend/holiday: [phone]  Office: [phone]    "

## 2024-06-15 NOTE — PROGRESS NOTES
Cross cover notified that patient is having increasing agitation and paranoia.  She was admitted yesterday for new paranoia over the last 1 to 2 weeks.  Suspected likely primary psychiatric issue although neurology is following and ordered an MRI and autoimmune encephalitis panel.  Psychiatry has yet to see patient to offer medication recommendations.  Given her paranoia I think olanzapine may be a good option for her.  -Ordered olanzapine 2.5 mg twice daily as needed for agitation and paranoia  -If she will not take an oral medication that I have ordered IV Haldol 2 mg every 6 hours as needed

## 2024-06-15 NOTE — PLAN OF CARE
"Assessments:   Alert, confused. No behaviors. VSS. Denies pain. Up SBA. Sitter at bedside for safety. Trace edema on BLE. Tolerated regular diet. MRI checklist faxed.      Treatment Plan: Neuro following recommending MRI brain, encephalitis panel and Psych eval    Bedside Nurse: David Clement RN       Problem: Adult Inpatient Plan of Care  Goal: Plan of Care Review  Description: The Plan of Care Review/Shift note should be completed every shift.  The Outcome Evaluation is a brief statement about your assessment that the patient is improving, declining, or no change.  This information will be displayed automatically on your shift  note.  Outcome: Progressing  Flowsheets (Taken 6/14/2024 0856)  Outcome Evaluation: confused, disoriented to time, place, situation  Plan of Care Reviewed With: family  Overall Patient Progress: no change  Goal: Patient-Specific Goal (Individualized)  Description: You can add care plan individualizations to a care plan. Examples of Individualization might be:  \"Parent requests to be called daily at 9am for status\", \"I have a hard time hearing out of my right ear\", or \"Do not touch me to wake me up as it startles  me\".  Outcome: Progressing  Goal: Absence of Hospital-Acquired Illness or Injury  Outcome: Progressing  Intervention: Identify and Manage Fall Risk  Recent Flowsheet Documentation  Taken 6/14/2024 1700 by David Clement RN  Safety Promotion/Fall Prevention:   activity supervised   assistive device/personal items within reach   clutter free environment maintained   nonskid shoes/slippers when out of bed   safety round/check completed  Intervention: Prevent Skin Injury  Recent Flowsheet Documentation  Taken 6/14/2024 1700 by David Clement RN  Body Position: position changed independently  Intervention: Prevent and Manage VTE (Venous Thromboembolism) Risk  Recent Flowsheet Documentation  Taken 6/14/2024 1700 by David Clement RN  VTE Prevention/Management: " SCDs (sequential compression devices) off  Goal: Optimal Comfort and Wellbeing  Outcome: Progressing  Goal: Readiness for Transition of Care  Outcome: Progressing  Intervention: Mutually Develop Transition Plan  Recent Flowsheet Documentation  Taken 6/14/2024 1709 by David Clement RN  Equipment Currently Used at Home: none     Problem: Electrolyte Imbalance  Goal: Electrolyte Balance  Outcome: Progressing     Problem: Fatigue  Goal: Improved Activity Tolerance  Outcome: Progressing  Intervention: Promote Improved Energy  Recent Flowsheet Documentation  Taken 6/14/2024 1700 by David Clement RN  Activity Management: activity adjusted per tolerance     Problem: Fall Injury Risk  Goal: Absence of Fall and Fall-Related Injury  Outcome: Progressing  Intervention: Identify and Manage Contributors  Recent Flowsheet Documentation  Taken 6/14/2024 1700 by David Clement RN  Medication Review/Management: medications reviewed  Intervention: Promote Injury-Free Environment  Recent Flowsheet Documentation  Taken 6/14/2024 1700 by David Clement RN  Safety Promotion/Fall Prevention:   activity supervised   assistive device/personal items within reach   clutter free environment maintained   nonskid shoes/slippers when out of bed   safety round/check completed     Goal Outcome Evaluation:      Plan of Care Reviewed With: family    Overall Patient Progress: no change    Outcome Evaluation: confused, disoriented to time, place, situation

## 2024-06-16 PROCEDURE — 120N000001 HC R&B MED SURG/OB

## 2024-06-16 PROCEDURE — 250N000013 HC RX MED GY IP 250 OP 250 PS 637: Performed by: PSYCHIATRY & NEUROLOGY

## 2024-06-16 PROCEDURE — 250N000013 HC RX MED GY IP 250 OP 250 PS 637: Performed by: HOSPITALIST

## 2024-06-16 PROCEDURE — 999N000104 HC STATISTIC NO CHARGE: Performed by: PHYSICAL THERAPIST

## 2024-06-16 PROCEDURE — 99233 SBSQ HOSP IP/OBS HIGH 50: CPT | Performed by: HOSPITALIST

## 2024-06-16 RX ADMIN — LEVOTHYROXINE SODIUM 88 MCG: 0.09 TABLET ORAL at 08:22

## 2024-06-16 RX ADMIN — OLANZAPINE 5 MG: 5 TABLET, FILM COATED ORAL at 21:15

## 2024-06-16 RX ADMIN — ATORVASTATIN CALCIUM 20 MG: 20 TABLET, FILM COATED ORAL at 08:21

## 2024-06-16 ASSESSMENT — ACTIVITIES OF DAILY LIVING (ADL)
ADLS_ACUITY_SCORE: 27
ADLS_ACUITY_SCORE: 28
ADLS_ACUITY_SCORE: 27
ADLS_ACUITY_SCORE: 28
ADLS_ACUITY_SCORE: 27
ADLS_ACUITY_SCORE: 28
ADLS_ACUITY_SCORE: 27
ADLS_ACUITY_SCORE: 28
ADLS_ACUITY_SCORE: 27
ADLS_ACUITY_SCORE: 28
ADLS_ACUITY_SCORE: 27
ADLS_ACUITY_SCORE: 28

## 2024-06-16 NOTE — PROGRESS NOTES
PT: Orders received. Chart reviewed and discussed with care team.  PT not indicated due to pt ambulating well with staff in hallway.  Currently still has 1:1 sitter so staff are walking with her in hallway d/t this.  No acute PT goals identified.  Defer discharge recommendations to medical team.  Will complete orders.

## 2024-06-16 NOTE — PLAN OF CARE
"Patient up with SBA in [place] and to bathroom. Showered today. Sitter at bedside. Patient still talking in tangents but much improved from this morning.     Problem: Adult Inpatient Plan of Care  Goal: Plan of Care Review  Description: The Plan of Care Review/Shift note should be completed every shift.  The Outcome Evaluation is a brief statement about your assessment that the patient is improving, declining, or no change.  This information will be displayed automatically on your shift  note.  Outcome: Progressing  Goal: Patient-Specific Goal (Individualized)  Description: You can add care plan individualizations to a care plan. Examples of Individualization might be:  \"Parent requests to be called daily at 9am for status\", \"I have a hard time hearing out of my right ear\", or \"Do not touch me to wake me up as it startles  me\".  Outcome: Progressing  Goal: Absence of Hospital-Acquired Illness or Injury  Outcome: Progressing  Intervention: Identify and Manage Fall Risk  Recent Flowsheet Documentation  Taken 6/16/2024 1200 by Munira Delcid, RN  Safety Promotion/Fall Prevention:   bedside attendant   activity supervised  Intervention: Prevent and Manage VTE (Venous Thromboembolism) Risk  Recent Flowsheet Documentation  Taken 6/16/2024 1200 by Munira Delcid, RN  VTE Prevention/Management: SCDs (sequential compression devices) off  Goal: Optimal Comfort and Wellbeing  Outcome: Progressing  Goal: Readiness for Transition of Care  Outcome: Progressing   Goal Outcome Evaluation:                        "

## 2024-06-16 NOTE — PLAN OF CARE
"Assessments:   Alert, confused. No behaviors, illogical speech. Up SBA, ambulated in hallways. Sitter at bedside for safety. Patient took her bedtime Zyprexa, was cooperative and pleasant     Treatment Plan: symptom management, PRN Zyprexa, Remeron, and Haldol, bedside attendant     Bedside Nurse: David Clement RN       Problem: Adult Inpatient Plan of Care  Goal: Plan of Care Review  Description: The Plan of Care Review/Shift note should be completed every shift.  The Outcome Evaluation is a brief statement about your assessment that the patient is improving, declining, or no change.  This information will be displayed automatically on your shift  note.  Outcome: Progressing  Flowsheets (Taken 6/15/2024 2223)  Outcome Evaluation: confused, easily redirectable, ambulated in hallways  Plan of Care Reviewed With: family  Overall Patient Progress: improving  Goal: Patient-Specific Goal (Individualized)  Description: You can add care plan individualizations to a care plan. Examples of Individualization might be:  \"Parent requests to be called daily at 9am for status\", \"I have a hard time hearing out of my right ear\", or \"Do not touch me to wake me up as it startles  me\".  Outcome: Progressing  Goal: Absence of Hospital-Acquired Illness or Injury  Outcome: Progressing  Intervention: Identify and Manage Fall Risk  Recent Flowsheet Documentation  Taken 6/15/2024 1600 by David Clement RN  Safety Promotion/Fall Prevention:   assistive device/personal items within reach   bedside attendant   safety round/check completed   activity supervised   increase visualization of patient   nonskid shoes/slippers when out of bed  Intervention: Prevent Skin Injury  Recent Flowsheet Documentation  Taken 6/15/2024 1600 by David Clement RN  Body Position:   position changed independently   supine, head elevated  Goal: Optimal Comfort and Wellbeing  Outcome: Progressing  Goal: Readiness for Transition of Care  Outcome: " Progressing     Problem: Electrolyte Imbalance  Goal: Electrolyte Balance  Outcome: Progressing     Problem: Fatigue  Goal: Improved Activity Tolerance  Outcome: Progressing  Intervention: Promote Improved Energy  Recent Flowsheet Documentation  Taken 6/15/2024 1600 by David Clement RN  Activity Management: activity adjusted per tolerance     Problem: Fall Injury Risk  Goal: Absence of Fall and Fall-Related Injury  Outcome: Progressing  Intervention: Identify and Manage Contributors  Recent Flowsheet Documentation  Taken 6/15/2024 1600 by David Clement RN  Medication Review/Management: medications reviewed  Intervention: Promote Injury-Free Environment  Recent Flowsheet Documentation  Taken 6/15/2024 1600 by David Clement RN  Safety Promotion/Fall Prevention:   assistive device/personal items within reach   bedside attendant   safety round/check completed   activity supervised   increase visualization of patient   nonskid shoes/slippers when out of bed     Goal Outcome Evaluation:      Plan of Care Reviewed With: family    Overall Patient Progress: improving    Outcome Evaluation: confused, easily redirectable, ambulated in hallways

## 2024-06-16 NOTE — PROGRESS NOTES
[facility]    Hospitalist Progress Note      Assessment & Plan   Maribell Mendoza is a 74 year old female w a history of hyperlipidemia, osteoporosis, hypothyroidism and a small goiter due to Hashimoto's thyroiditis who was brought in by her son due to altered mental status.       #Encephalopathy. Paranoia with delusions.  Anxiety: The patient is highly functional at baseline and lives independently in a duplex.  She is very meticulous and organized.  Her son lives in [place] and 2 weeks ago they went to [place] for 5 days in [facility].  He did not notice anything different about her at that time.  She was her usual self and interacting quite normally with his family.  Interestingly, in the last 1 to 2 months there have been plans for her to move out of her duplex down to [place] to live with her son and his family.    -About 1.5 weeks ago, after coming back from [place], patient was making paranoid statements about artificial intelligence spying on her.  Her son at that time tried to reassure her.  She is reportedly also been anxious/paranoid with her friends/ over the last week.  Her neighbor had also noticed that she was not following her usual routine.  She was leaving her outside lights on all day.  Her neighbor would noticed that her anterior lights were on at 2 in the morning.  Her neighbor relayed this to her son who then came in to see her earlier this week.  -Her son notes that her duplex is usually very meticulously clean.  She had been boxing things in anticipation of the move which apparently had been ramped up over the last couple of weeks.  He stayed with her and she was continuing to make very paranoid statements.  She thought that she was being spied on.  When he arrived to the [facility] the faucets were all also running.  Son notes that she is also not been sleeping well since he has been with her.  -In the ER, patient paranoid and restless.  Her CBC and BMP are  "unremarkable.  CT head without acute abnormality.  UA is without indication of infection.  She does not have focal deficits but has continued to be paranoid and anxious.  -Pt has continued to exhibit significant paranoia and had ongoing agitation overnight 6/14-6/15.  She is incredibly tangential and talkative.  She is not willing to have MRI of the brain as she states \"she had one already; ask my cousin .\"  She tells [facility] fevers/chills. No pain.    -On 6/16, pt is calm but again tangential.  She is not willing to have MRI of brain as she tells me that I can just look at MRI she had in past which looks like it was in 2006.  I explained that we are evaluating for something recent as cause of her condition and she then talked about soap bubbles as calming mechanism.    -Maintain one-to-one sitter.  If patient were to try to leave would need to be placed on a 72-hour hold.  -At this point, I do not see any clear infection or focal neurologic issue as etiology of her symptoms.  I do wonder if the recent trip to [place], stress of getting ready to move to [place] may have triggered this episode.  -Neurology evaluated patient on 6/14.  Autoimmune encephalitis panel ordered along with MRI brain. Unfortunately, pt is not willing to have MRI of brain so will discontinue order until patient is more cooperative.    -Psych consulted.   Scheduled zyprexa with prn dosing. Remeron prn for insomnia. Haldol IV prn if refuses PO.  Will need ongoing psych assessment.  -PT consulted as pt notes that she needed someone with her while walking halls.      #History of hypothyroidism: Continue home LT4  #History of HL: Continue statin     DVT Prophylaxis: Pneumatic Compression Devices  Dispo: Pt still quite paranoid, anxious and tangential. Ongoing psych assessment. Follow up encephalitis panel. If pt willing to have MRI of brain can be completed.   Code Status:  Full Code.    I did call and update son, Antonio, by phone on 6/16.     Bladimir " MD Michael    Interval History   Still tangential. Slept through night per night nurse.  Not willing to have MRI.  When discussed reasoning for MRI of brain she told me that soap bubbles are used to relax her.     -Data reviewed today: I reviewed all new labs and imaging results over the last 24 hours. I personally reviewed     Physical Exam   Temp: 97.5  F (36.4  C) Temp src: Oral BP: (!) 158/76 Pulse: 58   Resp: 16 SpO2: 96 % O2 Device: None (Room air)    Vitals:    06/14/24 1916   Weight: 75.7 kg (166 lb 14.2 oz)     Vital Signs with Ranges  Temp:  [97.3  F (36.3  C)-98  F (36.7  C)] 97.5  F (36.4  C)  Pulse:  [58-68] 58  Resp:  [16] 16  BP: (147-190)/() 158/76  SpO2:  [94 %-98 %] 96 %  I/O last 3 completed shifts:  In: 1800 [P.O.:1800]  Out: -     Constitutional: Sitting in chair. Tangential.  HEENT: Normocephalic. MMM, No elevation of JVD noted.   Respiratory: Nl WOB, Clear bilaterally, No wheezes or crackles  Cardiovascular: Regular, no murmur  GI: BS+, NT, ND  Skin/Integumen: WWP, no rash. No edema  Neuro: CNII-XII intact. Moves all extremities. No tremor. Alert and oriented but tangential.      Medications   Current Facility-Administered Medications   Medication Dose Route Frequency Provider Last Rate Last Admin     Current Facility-Administered Medications   Medication Dose Route Frequency Provider Last Rate Last Admin    atorvastatin (LIPITOR) tablet 20 mg  20 mg Oral Daily Bladimir Rodriguez MD   20 mg at 06/16/24 0821    levothyroxine (SYNTHROID/LEVOTHROID) tablet 88 mcg  88 mcg Oral Daily Bladimir Rodriguez MD   88 mcg at 06/16/24 0822    OLANZapine (zyPREXA) tablet 5 mg  5 mg Oral At Bedtime Mague Alberts MD   5 mg at 06/15/24 2117    sodium chloride (PF) 0.9% PF flush 3 mL  3 mL Intracatheter Q8H Marisol Rocha MD, MD   3 mL at 06/16/24 0824       Data   Recent Labs   Lab 06/14/24  1111 06/14/24  0818 06/13/24  1747   WBC  --  6.3 8.2   HGB  --  14.4 14.9   MCV  --  87 85   PLT  --  189 216    NA  --  141 138   POTASSIUM  --  4.3 3.8   CHLORIDE  --  106 101   CO2  --  23 20*   BUN  --  19.2 27.3*   CR  --  0.89 1.01*   ANIONGAP  --  12 17*   GALINDO  --  8.9 9.9   * 68* 82   ALBUMIN  --   --  4.1   PROTTOTAL  --   --  7.1   BILITOTAL  --   --  1.3*   ALKPHOS  --   --  61   ALT  --   --  33   AST  --   --  41   LIPASE  --   --  63*       No results found for this or any previous visit (from the past 24 hour(s)).

## 2024-06-17 PROCEDURE — 120N000001 HC R&B MED SURG/OB

## 2024-06-17 PROCEDURE — 250N000013 HC RX MED GY IP 250 OP 250 PS 637: Performed by: INTERNAL MEDICINE

## 2024-06-17 PROCEDURE — 99232 SBSQ HOSP IP/OBS MODERATE 35: CPT | Performed by: HOSPITALIST

## 2024-06-17 PROCEDURE — 250N000013 HC RX MED GY IP 250 OP 250 PS 637: Performed by: HOSPITALIST

## 2024-06-17 RX ADMIN — OLANZAPINE 2.5 MG: 2.5 TABLET, FILM COATED ORAL at 14:36

## 2024-06-17 RX ADMIN — LEVOTHYROXINE SODIUM 88 MCG: 0.09 TABLET ORAL at 09:36

## 2024-06-17 RX ADMIN — ATORVASTATIN CALCIUM 20 MG: 20 TABLET, FILM COATED ORAL at 09:36

## 2024-06-17 ASSESSMENT — ACTIVITIES OF DAILY LIVING (ADL)
ADLS_ACUITY_SCORE: 27

## 2024-06-17 NOTE — PROGRESS NOTES
Care Management Follow Up    Length of Stay (days): 4      Concerns to be Addressed:  discharge planning    Patient plan of care discussed at interdisciplinary rounds: Yes    Additional Information:  Sw spoke with the pt's son Tom to address his questions and concerns.  Tom currently lives in [place], [place].  He said that he plans to be here until everything is figured out with his mom.    Sw is awaiting a discharge plan recommendation and will proceed with discharge planning once a plan is established.    SARITHA Ruiz, [facility]  Inpatient Care Coordination  [facility]  [phone]

## 2024-06-17 NOTE — PROGRESS NOTES
[facility]    Medicine Progress Note - Hospitalist Service    Date of Admission:  6/13/2024    Assessment & Plan   Maribell Mendoza is a 74 year old female w a history of hyperlipidemia, osteoporosis, hypothyroidism and a small goiter due to Hashimoto's thyroiditis who was brought in by her son due to altered mental status.       #Paranoia with delusions.  Anxiety: The patient is highly functional at baseline and lives independently in a duplex.  She is very meticulous and organized.  Her son lives in [place] and 2 weeks ago they went to [place] for 5 days in [facility].  He did not notice anything different about her at that time.  She was her usual self and interacting quite normally with his family.  Interestingly, in the last 1 to 2 months there have been plans for her to move out of her duplex down to [place] to live with her son and his family.    -About 1.5 weeks ago, after coming back from [place], patient was making paranoid statements about artificial intelligence spying on her.  Her son at that time tried to reassure her.  She is reportedly also been anxious/paranoid with her friends/ over the last week.  Her neighbor had also noticed that she was not following her usual routine.  She was leaving her outside lights on all day.  Her neighbor would noticed that her anterior lights were on at 2 in the morning.  Her neighbor relayed this to her son who then came in to see her earlier this week.  -Her son notes that her duplex is usually very meticulously clean.  She had been boxing things in anticipation of the move which apparently had been ramped up over the last couple of weeks.  He stayed with her and she was continuing to make very paranoid statements.  She thought that she was being spied on.  When he arrived to the [facility] the faucets were all also running.  Son notes that she is also not been sleeping well since he has been with her.  -In the ER, patient paranoid and  "restless.  Her CBC and BMP are unremarkable.  CT head without acute abnormality.  UA is without indication of infection.  She does not have focal deficits but has continued to be paranoid and anxious.  -Pt has continued to exhibit significant paranoia and had ongoing agitation overnight 6/14-6/15.  She is incredibly tangential and talkative.  She is not willing to have MRI of the brain as she states \"she had one already; ask my cousin .\"  She tells Levy fevers/chills. No pain.    -On 6/16, pt is calm but again tangential.  She is not willing to have MRI of brain as she tells me that I can just look at MRI she had in past which looks like it was in 2006.  I explained that we are evaluating for something recent as cause of her condition and she then talked about soap bubbles as calming mechanism.    -Maintain one-to-one sitter.  If patient were to try to leave would need to be placed on a 72-hour hold.  -At this point, I do not see any clear infection or focal neurologic issue as etiology of her symptoms.  I do wonder if the recent trip to [place], stress of getting ready to move to [place] may have triggered this episode.  -Neurology evaluated patient on 6/14.  Autoimmune encephalitis panel ordered along with MRI brain. Unfortunately, pt is not willing to have MRI of brain so will discontinue order until patient is more cooperative.    -Psych consulted.   Scheduled zyprexa with prn dosing. Remeron prn for insomnia. Haldol IV prn if refuses PO.  Will need ongoing psych assessment.  -PT consulted as pt notes that she needed someone with her while walking halls.      #History of hypothyroidism: Continue home LT4  #History of HL: Continue statin     DVT Prophylaxis: Pneumatic Compression Devices  Dispo: Pt still quite paranoid, anxious and tangential. Ongoing psych assessment. Follow up encephalitis panel. If pt willing to have MRI of brain can be completed.   Code Status:  Full Code.          Diet: Advance Diet as " "Tolerated: Regular Diet Adult; Regular Diet Adult    DVT Prophylaxis: Pneumatic Compression Devices  Jerez Catheter: Not present  Lines: None     Cardiac Monitoring: None  Code Status: Full Code      Clinically Significant Risk Factors                               # Overweight: Estimated body mass index is 29.56 kg/m  as calculated from the following:    Height as of this encounter: 1.6 m (5' 3\").    Weight as of this encounter: 75.7 kg (166 lb 14.2 oz)., PRESENT ON ADMISSION     # Financial/Environmental Concerns: none         Disposition Plan     Medically Ready for Discharge: Anticipated in 2-4 Days        Garry Loera MD      Hospitalist Service  [facility]  Securely message with [employer] (more info)  Text page via [facility] Paging/Directory   ______________________________________________________________________    Interval History   Denies pain, reports good sleep overnight, good appetite, no other physical complaints.  Son is in the room, I ask him if this is the first time she have this type of mental derangement he says yes.  I am consulting psychiatry for follow-up. Kep sitter in the room  Physical Exam   Vital Signs: Temp: 98.5  F (36.9  C) Temp src: Oral BP: (!) 167/69 Pulse: 72   Resp: 18 SpO2: 98 % O2 Device: None (Room air)    Weight: 166 lbs 14.21 oz    Constitutional: awake, alert, cooperative, no apparent distress, and appears stated age  Respiratory: No increased work of breathing, good air exchange, clear to auscultation bilaterally, no crackles or wheezing  Cardiovascular: Normal apical impulse, regular rate and rhythm, normal S1 and S2, no S3 or S4, and no murmur noted  Psychiatry: Patient is talkative, she engages in conversation but she is totally delusional.  She is incoherently making statements of associations nonsensical.  She is calm and cooperative, not aggressive.      Medical Decision Making       45 MINUTES SPENT BY ME on the date of service doing chart review, " history, exam, documentation & further activities per the note.      Data         Imaging results reviewed over the past 24 hrs:   No results found for this or any previous visit (from the past 24 hour(s)).

## 2024-06-17 NOTE — PLAN OF CARE
"Alert. Confused and forgetful, easily directable. SBA. Sitter at bedside. PIV SL. Continue to monitor.    Goal Outcome Evaluation:      Plan of Care Reviewed With: patient    Overall Patient Progress: no changeOverall Patient Progress: no change    Outcome Evaluation: Confused, forgetful but easily directable. Sitter at bedside.      Problem: Adult Inpatient Plan of Care  Goal: Plan of Care Review  Description: The Plan of Care Review/Shift note should be completed every shift.  The Outcome Evaluation is a brief statement about your assessment that the patient is improving, declining, or no change.  This information will be displayed automatically on your shift  note.  Outcome: Progressing  Flowsheets (Taken 6/17/2024 0640)  Outcome Evaluation: Confused, forgetful but easily directable. Sitter at bedside.  Plan of Care Reviewed With: patient  Overall Patient Progress: no change  Goal: Patient-Specific Goal (Individualized)  Description: You can add care plan individualizations to a care plan. Examples of Individualization might be:  \"Parent requests to be called daily at 9am for status\", \"I have a hard time hearing out of my right ear\", or \"Do not touch me to wake me up as it startles  me\".  Outcome: Progressing  Goal: Absence of Hospital-Acquired Illness or Injury  Outcome: Progressing  Intervention: Identify and Manage Fall Risk  Recent Flowsheet Documentation  Taken 6/17/2024 0022 by Loc Clayton, RN  Safety Promotion/Fall Prevention:   activity supervised   safety round/check completed   nonskid shoes/slippers when out of bed   patient and family education   increase visualization of patient  Intervention: Prevent Skin Injury  Recent Flowsheet Documentation  Taken 6/17/2024 0022 by Loc Clayton, RN  Body Position: position changed independently  Goal: Optimal Comfort and Wellbeing  Outcome: Progressing  Goal: Readiness for Transition of Care  Outcome: Progressing     Problem: Electrolyte Imbalance  Goal: " Electrolyte Balance  Outcome: Progressing     Problem: Fatigue  Goal: Improved Activity Tolerance  Outcome: Progressing  Intervention: Promote Improved Energy  Recent Flowsheet Documentation  Taken 6/17/2024 0022 by Loc Clayton, RN  Activity Management:   activity encouraged   back to bed     Problem: Fall Injury Risk  Goal: Absence of Fall and Fall-Related Injury  Outcome: Progressing  Intervention: Identify and Manage Contributors  Recent Flowsheet Documentation  Taken 6/17/2024 0022 by Loc Clayton, RN  Medication Review/Management: medications reviewed  Intervention: Promote Injury-Free Environment  Recent Flowsheet Documentation  Taken 6/17/2024 0022 by Loc Clayton, RN  Safety Promotion/Fall Prevention:   activity supervised   safety round/check completed   nonskid shoes/slippers when out of bed   patient and family education   increase visualization of patient      PAST MEDICAL HISTORY:  Alcohol withdrawal seizure     Capsulitis, adhesive shoulder     Cocaine abuse     H/O ETOH abuse     Liver laceration

## 2024-06-17 NOTE — PROGRESS NOTES
Care Management Follow Up    Length of Stay (days): 4    Expected Discharge Date: 06/19/2024 - once IP Hospice is available     Concerns to be Addressed:  discharge planning     Patient plan of care discussed at interdisciplinary rounds: Yes    Anticipated Discharge Disposition:  IP hospice     Anticipated Discharge Services:  hospice  Anticipated Discharge DME:  TBD    Patient/family educated on Medicare website which has current facility and service quality ratings:  yes  Education Provided on the Discharge Plan:  yes  Patient/Family in Agreement with the Plan:  yes    Referrals Placed by CM/SW:  hospice    Additional Information:  Sw left a vm for [facility] inpatient hospice, requesting a call back to discuss the pt's referral.    Sw will continue with discharge planning and will be available as needed until discharge.    SARITHA Ruiz, [facility]  Inpatient Care Coordination  [facility]  [phone]

## 2024-06-17 NOTE — PLAN OF CARE
"Assessments:   Alert, confused/disorganized thoughts. Pleasant and cooperative. Denies pain. Ambulates well in hallways with SBA. Slightly elevated BPs to 150s. .     Treatment Plan: symptom management, Neuro following, awaiting safe discharge plan    Bedside Nurse: David Clement RN       Problem: Adult Inpatient Plan of Care  Goal: Plan of Care Review  Description: The Plan of Care Review/Shift note should be completed every shift.  The Outcome Evaluation is a brief statement about your assessment that the patient is improving, declining, or no change.  This information will be displayed automatically on your shift  note.  Outcome: Progressing  Flowsheets (Taken 6/16/2024 2231)  Outcome Evaluation: Still confused. Pleasant and easily redirectable.  Plan of Care Reviewed With: family  Overall Patient Progress: no change  Goal: Patient-Specific Goal (Individualized)  Description: You can add care plan individualizations to a care plan. Examples of Individualization might be:  \"Parent requests to be called daily at 9am for status\", \"I have a hard time hearing out of my right ear\", or \"Do not touch me to wake me up as it startles  me\".  Outcome: Progressing  Goal: Absence of Hospital-Acquired Illness or Injury  Outcome: Progressing  Intervention: Identify and Manage Fall Risk  Recent Flowsheet Documentation  Taken 6/16/2024 1625 by David Clement RN  Safety Promotion/Fall Prevention:   activity supervised   safety round/check completed   nonskid shoes/slippers when out of bed   patient and family education   increase visualization of patient  Intervention: Prevent Skin Injury  Recent Flowsheet Documentation  Taken 6/16/2024 1625 by David Clement RN  Body Position: position changed independently  Intervention: Prevent and Manage VTE (Venous Thromboembolism) Risk  Recent Flowsheet Documentation  Taken 6/16/2024 1625 by David Clement RN  VTE Prevention/Management: SCDs (sequential compression " devices) off  Intervention: Prevent Infection  Recent Flowsheet Documentation  Taken 6/16/2024 1625 by David Clement RN  Infection Prevention: rest/sleep promoted  Goal: Optimal Comfort and Wellbeing  Outcome: Progressing  Goal: Readiness for Transition of Care  Outcome: Progressing     Problem: Electrolyte Imbalance  Goal: Electrolyte Balance  Outcome: Progressing     Problem: Fatigue  Goal: Improved Activity Tolerance  Outcome: Progressing     Problem: Fall Injury Risk  Goal: Absence of Fall and Fall-Related Injury  Outcome: Progressing  Intervention: Identify and Manage Contributors  Recent Flowsheet Documentation  Taken 6/16/2024 1625 by David Clement RN  Medication Review/Management: medications reviewed  Intervention: Promote Injury-Free Environment  Recent Flowsheet Documentation  Taken 6/16/2024 1625 by David Clement RN  Safety Promotion/Fall Prevention:   activity supervised   safety round/check completed   nonskid shoes/slippers when out of bed   patient and family education   increase visualization of patient     Goal Outcome Evaluation:      Plan of Care Reviewed With: family    Overall Patient Progress: no change    Outcome Evaluation: Still confused. Pleasant and easily redirectable.

## 2024-06-18 ENCOUNTER — APPOINTMENT (OUTPATIENT)
Dept: MRI IMAGING | Facility: CLINIC | Age: 74
DRG: 071 | End: 2024-06-18
Attending: HOSPITALIST
Payer: MEDICARE

## 2024-06-18 LAB
ANION GAP SERPL CALCULATED.3IONS-SCNC: 17 MMOL/L (ref 7–15)
BASOPHILS # BLD AUTO: 0 10E3/UL (ref 0–0.2)
BASOPHILS NFR BLD AUTO: 0 %
BUN SERPL-MCNC: 24.7 MG/DL (ref 8–23)
CALCIUM SERPL-MCNC: 10.2 MG/DL (ref 8.8–10.2)
CHLORIDE SERPL-SCNC: 104 MMOL/L (ref 98–107)
CREAT SERPL-MCNC: 1.11 MG/DL (ref 0.51–0.95)
DEPRECATED HCO3 PLAS-SCNC: 22 MMOL/L (ref 22–29)
EGFRCR SERPLBLD CKD-EPI 2021: 52 ML/MIN/1.73M2
EOSINOPHIL # BLD AUTO: 0.1 10E3/UL (ref 0–0.7)
EOSINOPHIL NFR BLD AUTO: 1 %
ERYTHROCYTE [DISTWIDTH] IN BLOOD BY AUTOMATED COUNT: 14.7 % (ref 10–15)
GLUCOSE SERPL-MCNC: 110 MG/DL (ref 70–99)
HCT VFR BLD AUTO: 43.4 % (ref 35–47)
HGB BLD-MCNC: 14.7 G/DL (ref 11.7–15.7)
IMM GRANULOCYTES # BLD: 0.1 10E3/UL
IMM GRANULOCYTES NFR BLD: 1 %
LYMPHOCYTES # BLD AUTO: 1.7 10E3/UL (ref 0.8–5.3)
LYMPHOCYTES NFR BLD AUTO: 17 %
MCH RBC QN AUTO: 28.8 PG (ref 26.5–33)
MCHC RBC AUTO-ENTMCNC: 33.9 G/DL (ref 31.5–36.5)
MCV RBC AUTO: 85 FL (ref 78–100)
MONOCYTES # BLD AUTO: 0.8 10E3/UL (ref 0–1.3)
MONOCYTES NFR BLD AUTO: 8 %
NEUTROPHILS # BLD AUTO: 7.1 10E3/UL (ref 1.6–8.3)
NEUTROPHILS NFR BLD AUTO: 73 %
NRBC # BLD AUTO: 0 10E3/UL
NRBC BLD AUTO-RTO: 0 /100
PLATELET # BLD AUTO: 231 10E3/UL (ref 150–450)
POTASSIUM SERPL-SCNC: 3.7 MMOL/L (ref 3.4–5.3)
RBC # BLD AUTO: 5.11 10E6/UL (ref 3.8–5.2)
SODIUM SERPL-SCNC: 143 MMOL/L (ref 135–145)
WBC # BLD AUTO: 9.7 10E3/UL (ref 4–11)

## 2024-06-18 PROCEDURE — 99233 SBSQ HOSP IP/OBS HIGH 50: CPT

## 2024-06-18 PROCEDURE — 36415 COLL VENOUS BLD VENIPUNCTURE: CPT | Performed by: HOSPITALIST

## 2024-06-18 PROCEDURE — A9585 GADOBUTROL INJECTION: HCPCS | Performed by: HOSPITALIST

## 2024-06-18 PROCEDURE — 250N000013 HC RX MED GY IP 250 OP 250 PS 637: Performed by: HOSPITALIST

## 2024-06-18 PROCEDURE — 99232 SBSQ HOSP IP/OBS MODERATE 35: CPT | Performed by: HOSPITALIST

## 2024-06-18 PROCEDURE — 255N000002 HC RX 255 OP 636: Performed by: HOSPITALIST

## 2024-06-18 PROCEDURE — 70553 MRI BRAIN STEM W/O & W/DYE: CPT | Mod: MG

## 2024-06-18 PROCEDURE — 250N000011 HC RX IP 250 OP 636: Mod: JZ | Performed by: INTERNAL MEDICINE

## 2024-06-18 PROCEDURE — 85025 COMPLETE CBC W/AUTO DIFF WBC: CPT | Performed by: HOSPITALIST

## 2024-06-18 PROCEDURE — 80048 BASIC METABOLIC PNL TOTAL CA: CPT | Performed by: HOSPITALIST

## 2024-06-18 PROCEDURE — 250N000011 HC RX IP 250 OP 636: Mod: JZ | Performed by: HOSPITALIST

## 2024-06-18 PROCEDURE — 120N000001 HC R&B MED SURG/OB

## 2024-06-18 RX ORDER — GADOBUTROL 604.72 MG/ML
7.5 INJECTION INTRAVENOUS ONCE
Status: COMPLETED | OUTPATIENT
Start: 2024-06-18 | End: 2024-06-18

## 2024-06-18 RX ORDER — LORAZEPAM 2 MG/ML
0.5 INJECTION INTRAMUSCULAR ONCE
Status: COMPLETED | OUTPATIENT
Start: 2024-06-18 | End: 2024-06-18

## 2024-06-18 RX ADMIN — ATORVASTATIN CALCIUM 20 MG: 20 TABLET, FILM COATED ORAL at 08:13

## 2024-06-18 RX ADMIN — GADOBUTROL 7.5 ML: 604.72 INJECTION INTRAVENOUS at 14:52

## 2024-06-18 RX ADMIN — LORAZEPAM 0.5 MG: 2 INJECTION INTRAMUSCULAR; INTRAVENOUS at 14:06

## 2024-06-18 RX ADMIN — HALOPERIDOL LACTATE 2 MG: 5 INJECTION, SOLUTION INTRAMUSCULAR at 05:14

## 2024-06-18 RX ADMIN — LEVOTHYROXINE SODIUM 88 MCG: 0.09 TABLET ORAL at 08:13

## 2024-06-18 ASSESSMENT — ACTIVITIES OF DAILY LIVING (ADL)
ADLS_ACUITY_SCORE: 27
ADLS_ACUITY_SCORE: 26
ADLS_ACUITY_SCORE: 27
ADLS_ACUITY_SCORE: 26
ADLS_ACUITY_SCORE: 27
ADLS_ACUITY_SCORE: 27
ADLS_ACUITY_SCORE: 26
ADLS_ACUITY_SCORE: 25
ADLS_ACUITY_SCORE: 27
ADLS_ACUITY_SCORE: 26
ADLS_ACUITY_SCORE: 27
ADLS_ACUITY_SCORE: 25
ADLS_ACUITY_SCORE: 27
ADLS_ACUITY_SCORE: 27

## 2024-06-18 NOTE — PLAN OF CARE
"End of Shift Summary  For vital signs and complete assessments, please see documentation flowsheets.     Pertinent assessments: Alert to self and place, disoriented to time andsituation. VSS. On RA. Speech is rambling with flight of ideas, and illogical deductions. Neuro checks intact except for orientation. Disorganized thought process. Rambles. Became agitated and upset towards the end of shift. Refused to take zyprexa until she was \"outside about to get  in her car\". SBA. Sitter at bedside. On Regular diet & tolerating well.     Major Shift Events: refused prn zyprexa, piv SL.      Treatment Plan: Neurology following, Pschy consulted gain, monitor for safety and personal needs .     Bedside Nurse: jeffy delcid RN     Goal Outcome Evaluation:      Problem: Adult Inpatient Plan of Care  Goal: Plan of Care Review  Description: The Plan of Care Review/Shift note should be completed every shift.  The Outcome Evaluation is a brief statement about your assessment that the patient is improving, declining, or no change.  This information will be displayed automatically on your shift  note.  Outcome: Progressing  Flowsheets (Taken 6/17/2024 5053)  Outcome Evaluation: confused, redirectable, sitter at bedside, personal needs met but patient continues to have bouts of confusion.  Plan of Care Reviewed With: patient  Overall Patient Progress: no change  Goal: Patient-Specific Goal (Individualized)  Description: You can add care plan individualizations to a care plan. Examples of Individualization might be:  \"Parent requests to be called daily at 9am for status\", \"I have a hard time hearing out of my right ear\", or \"Do not touch me to wake me up as it startles  me\".  Outcome: Progressing  Goal: Absence of Hospital-Acquired Illness or Injury  Outcome: Progressing  Intervention: Identify and Manage Fall Risk  Recent Flowsheet Documentation  Taken 6/17/2024 1530 by Jeffy Delcid RN  Safety Promotion/Fall Prevention:   " activity supervised   assistive device/personal items within reach   increased rounding and observation  Goal: Optimal Comfort and Wellbeing  Outcome: Progressing  Goal: Readiness for Transition of Care  Outcome: Progressing     Problem: Electrolyte Imbalance  Goal: Electrolyte Balance  Outcome: Progressing     Problem: Fatigue  Goal: Improved Activity Tolerance  Outcome: Progressing     Problem: Fall Injury Risk  Goal: Absence of Fall and Fall-Related Injury  Outcome: Progressing  Intervention: Identify and Manage Contributors  Recent Flowsheet Documentation  Taken 6/17/2024 1530 by Jeffy Delcid RN  Medication Review/Management: medications reviewed  Intervention: Promote Injury-Free Environment  Recent Flowsheet Documentation  Taken 6/17/2024 1530 by Jeffy Delcid RN  Safety Promotion/Fall Prevention:   activity supervised   assistive device/personal items within reach   increased rounding and observation         Plan of Care Reviewed With: patient    Overall Patient Progress: no changeOverall Patient Progress: no change    Outcome Evaluation: confused, redirectable, sitter at bedside, personal needs met but patient continues to have bouts of confusion.
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
## 2024-06-18 NOTE — CONSULTS
"      Psychiatry Consultation; Follow up              Reason for Consult, requesting source:    Psychosis follow-up     Requesting source:     Labs and imaging reviewed, paged Dr. Loera               Interim history:    Maribell \"Samantha\" Reji is a 74 year old female who presented to the ED on 6/13/24 with paranoia and psychosis. She was observed to be behaving erratically, was disorganized, and appeared confused. Collateral reports that patient recently returned from a trip to [place] with her family and that she has been preparing to relocate to [place]. Record review indicates no formal mental health history, no prior mental health hospitalizations. Patient denies substance use. Patient was subsequently admitted to medical for additional work-up. Neurology has also consulted, MRI recommended however patient has refused. Patient was seen for initial psychiatry consult by Dr. Alberts on 6/15/24 and olanzapine was scheduled in addition to remeron for sleep. Psychiatry consult requested for ongoing psychosis follow-up. Patient has continued to remain paranoid, anxious, and tangential. Sitter remains.     Today Samantha was sitting in the family lounge with a sitter and her son. She tells me that she's feeling \"much better\" compared to last night and then explains how she felt \"like a caged junyyard dog.\" She is tangential and explains how she's usually introverted but is acting like and extrovert. She discusses her various travels, international regulations, and how she feels like she needs a triangle of support around her. She denies SI/HI/AH/VH. She tells me walking and fresh air are helpful. She discussed her old cat Katrin and how he was hyper aware of his surroundings and she compares this to her current state. Discussed recommendations to have MRI completed. She tells me that she had an MRI in the past for her knee and feet. Her son confirms that she has had bunion surgery and cataract surgery however he is unsure if " "an MRI was also involved. She tells me that her main concern is the noise during an MRI but that if she can listen to music that would help. Discussed that olanzapine had been ordered at bedtime to help with current sx, she does tell me that she is willing to try taking this, she does not recall declining it last night. She recognizes that she does not feel like herself and is willing to try medications to help her return to baseline.    Her son, Antonio, is present and confirms various aspects of her stories. Per Antonio, she is quite independent at baseline and often travels on her own. She traveled to [facility] several month ago independently. She has never experienced anything like her current presentation. Since admitting to the hospital, he has observed some improvements. She does appear less paranoid and agitated. Although tangential, she is somewhat redirectable. There does appear to be some reality base to many of her tangents which her son confirms as she is speaking.         Current Medications:     Current Facility-Administered Medications   Medication Dose Route Frequency Provider Last Rate Last Admin    atorvastatin (LIPITOR) tablet 20 mg  20 mg Oral Daily Bladimir Rodriguez MD   20 mg at 06/18/24 0813    levothyroxine (SYNTHROID/LEVOTHROID) tablet 88 mcg  88 mcg Oral Daily Bladimir Rodriguez MD   88 mcg at 06/18/24 0813    OLANZapine (zyPREXA) tablet 5 mg  5 mg Oral At Bedtime Mague Alberts MD   5 mg at 06/16/24 2115    sodium chloride (PF) 0.9% PF flush 3 mL  3 mL Intracatheter Q8H Marisol Rocha MD, MD   3 mL at 06/18/24 0813              MSE:   Appearance: awake, alert, dressed in hospital scrubs, appeared as age stated, and sitting in chair in family lounge  Attitude:  cooperative  Eye Contact:  fair, looking at son and sitter throughout conversation   Mood:   \"I'm better\", anxious   Affect:  mood congruent and intensity is exaggerated  Speech:  clear, coherent and rambling  Psychomotor Behavior:  no " "evidence of tardive dyskinesia, dystonia, or tics and sitting in chair, ambulation not observed   Thought Process:  tangental  Associations:  no loose associations  Thought Content:  no evidence of suicidal ideation or homicidal ideation, no auditory hallucinations present, and no visual hallucinations present  Insight:  partial  Judgement:  poor  Oriented to:  time, person, and place  Attention Span and Concentration:  poor  Recent and Remote Memory:  fair      Vital signs:  Temp: 97.3  F (36.3  C) Temp src: Oral BP: (!) 153/69 Pulse: 72   Resp: 20 SpO2: 94 % O2 Device: None (Room air)   Height: 160 cm (5' 3\") Weight: 75.7 kg (166 lb 14.2 oz)  Estimated body mass index is 29.56 kg/m  as calculated from the following:    Height as of this encounter: 1.6 m (5' 3\").    Weight as of this encounter: 75.7 kg (166 lb 14.2 oz).              DSM-5 Diagnosis:     Unspecified Psychosis vs Delirium  R/O acute stress disorder  R/O encephalopathy  R/O neurocognitive disorder              Assessment:   Maribell \"Samantha\" Reji is a 74 year old female who presented to the ED on 6/13/24 with paranoia and psychosis. She was observed to be behaving erratically, was disorganized, and appeared confused. Collateral reports that patient recently returned from a trip to [place] with her family and that she has been preparing to relocate to [place]. Record review indicates no formal mental health history, no prior mental health hospitalizations. Patient denies substance use. Patient was subsequently admitted to medical for additional work-up. Neurology has also consulted, MRI recommended however patient has refused. Patient was seen for initial psychiatry consult by Dr. Alberts on 6/15/24 and olanzapine was scheduled in addition to remeron for sleep. Patient has continued to remain paranoid, anxious, and tangential. Sitter remains. Patient did refuse olanzapine last night and has not slept.  Psychiatry consult requested for ongoing psychosis " follow-up.     Today, Samantha was sitting in the family lounger with her son and a sitter. She was pleasant and smiled when I greeted her. She is tangential in speech yet redirectable. She denies SI/HI/AH/VH and does not appear to be responding to internal stimuli. Her son provides additional collateral, prior to current presentation patient is highly functional and independent. No prior hx of mental health or neurologic concerns. Although patient is tangential, many aspects of her thoughts are reality based as confirmed by son. She does acknowledge that she does not feel like herself and tells me that she is usually introverted, not extroverted as she is now. She does tell me she is agreeable to completing and MRI for additional work-up as recommended by neurology. She is agreeable to continuing olanzapine at bedtime, she had refused this last night, noting that she wants to sleep and would like her thoughts to calm. Current acute presentation does not seem consistent with neurocognitive disorder such as dementia and emergency of underlying psychotic disorder does also seem unlikely at her current age. Patient is at risk for development of delirium which could be contributing to current presentation. Agree with continued medical and neurology work-up to rule out any underlying causes.           Summary of Recommendations:   Increase olanzapine to 7.5mg at bedtime and changed to ODT formulation; should this appear to be ineffective, could consider discontinuation and trial of seroquel or risperidone. For Seroquel, would start 12.5mg twice daily with additional 25mg at bedtime. Ideally we would like to limit polypharmacy   Continue haldol IV for acute agitation    Patient is willing to complete MRI as recommended by neurology   Continue medical work-up to rule out any additional underlying causes  5.  Delirium precautions:   Up during the day with lights on   Lights off at night, avoid interruptions during the night as  "much as possible   Family visits   Encourage wearing glasses   Reorientation   Avoid opioids, benzodiazepines, anticholinergics.     Continue to ensure proper nutrition, fluid and electrolyte balance. Monitor for infections, hypoxia, metabolic derangements, or other causes of delirium.     Please reconsult psychiatry as needed.     LYNDA [facility]  Consult/Liaison Psychiatry   [facility]    Contact information available via [facility] Paging/Directory  If I am not available, then [facility] line ([phone]) should know who is covering our consult service.   \"Much or all of the text in this note was generated through the use of Dragon Dictate voice to text software. Errors in spelling or words which appear to be out of contact are unintentional, may be present due having escaped editing\"          "

## 2024-06-18 NOTE — PLAN OF CARE
"End of Shift Summary  For vital signs and complete assessments, please see documentation flowsheets.     Pertinent assessments: Pt alert but forgetful at times, slightly paranoid but redirectable. Ambulating in halls with staff and son. Showered. Ok for MRI. Napped.     Major Shift Events: MRI    Treatment Plan: await MRI results, psych consult, night time meds, encourage sleep        Problem: Adult Inpatient Plan of Care  Goal: Plan of Care Review  Description: The Plan of Care Review/Shift note should be completed every shift.  The Outcome Evaluation is a brief statement about your assessment that the patient is improving, declining, or no change.  This information will be displayed automatically on your shift  note.  Outcome: Progressing  Flowsheets (Taken 6/18/2024 1433)  Outcome Evaluation: Pt slept, MRI performed  Plan of Care Reviewed With:   patient   child  Overall Patient Progress: improving  Goal: Patient-Specific Goal (Individualized)  Description: You can add care plan individualizations to a care plan. Examples of Individualization might be:  \"Parent requests to be called daily at 9am for status\", \"I have a hard time hearing out of my right ear\", or \"Do not touch me to wake me up as it startles  me\".  Outcome: Progressing  Goal: Absence of Hospital-Acquired Illness or Injury  Outcome: Progressing  Intervention: Identify and Manage Fall Risk  Recent Flowsheet Documentation  Taken 6/18/2024 0815 by Maricel Lancaster RN  Safety Promotion/Fall Prevention: lighting adjusted  Intervention: Prevent and Manage VTE (Venous Thromboembolism) Risk  Recent Flowsheet Documentation  Taken 6/18/2024 0815 by Maricel Lancaster RN  VTE Prevention/Management: compression stockings off  Goal: Optimal Comfort and Wellbeing  Outcome: Progressing  Goal: Readiness for Transition of Care  Outcome: Progressing     Problem: Electrolyte Imbalance  Goal: Electrolyte Balance  Outcome: Progressing     Problem: Fatigue  Goal: " Improved Activity Tolerance  Outcome: Progressing     Problem: Fall Injury Risk  Goal: Absence of Fall and Fall-Related Injury  Outcome: Progressing  Intervention: Identify and Manage Contributors  Recent Flowsheet Documentation  Taken 6/18/2024 0815 by Maricel Lancaster RN  Medication Review/Management: medications reviewed  Intervention: Promote Injury-Free Environment  Recent Flowsheet Documentation  Taken 6/18/2024 0815 by Maricel Lancaster RN  Safety Promotion/Fall Prevention: lighting adjusted   Goal Outcome Evaluation:      Plan of Care Reviewed With: patient, child    Overall Patient Progress: improvingOverall Patient Progress: improving    Outcome Evaluation: Pt slept, MRI performed

## 2024-06-18 NOTE — PROGRESS NOTES
[facility]    Medicine Progress Note - Hospitalist Service    Date of Admission:  6/13/2024    Assessment & Plan   Maribell Mendoza is a 74 year old female w a history of hyperlipidemia, osteoporosis, hypothyroidism and a small goiter due to Hashimoto's thyroiditis who was brought in by her son due to altered mental status.       #Paranoia with delusions.  Anxiety: The patient is highly functional at baseline and lives independently in a duplex.  She is very meticulous and organized.  Her son lives in [place] and 2 weeks ago they went to [place] for 5 days in [facility].  He did not notice anything different about her at that time.  She was her usual self and interacting quite normally with his family.  Interestingly, in the last 1 to 2 months there have been plans for her to move out of her duplex down to [place] to live with her son and his family.    -About 1.5 weeks ago, after coming back from [place], patient was making paranoid statements about artificial intelligence spying on her.  Her son at that time tried to reassure her.  She is reportedly also been anxious/paranoid with her friends/ over the last week.  Her neighbor had also noticed that she was not following her usual routine.  She was leaving her outside lights on all day.  Her neighbor would noticed that her anterior lights were on at 2 in the morning.  Her neighbor relayed this to her son who then came in to see her earlier this week.  -Her son notes that her duplex is usually very meticulously clean.  She had been boxing things in anticipation of the move which apparently had been ramped up over the last couple of weeks.  He stayed with her and she was continuing to make very paranoid statements.  She thought that she was being spied on.  When he arrived to the [facility] the faucets were all also running.  Son notes that she is also not been sleeping well since he has been with her.  -In the ER, patient paranoid and  "restless.  Her CBC and BMP are unremarkable.  CT head without acute abnormality.  UA is without indication of infection.  She does not have focal deficits but has continued to be paranoid and anxious.  -Pt has continued to exhibit significant paranoia and had ongoing agitation overnight 6/14-6/15.  She is incredibly tangential and talkative.  She is not willing to have MRI of the brain as she states \"she had one already; ask my cousin .\"  She tells Levy fevers/chills. No pain.    -On 6/16, pt is calm but again tangential.  She is not willing to have MRI of brain as she tells me that I can just look at MRI she had in past which looks like it was in 2006.  I explained that we are evaluating for something recent as cause of her condition and she then talked about soap bubbles as calming mechanism.    -Maintain one-to-one sitter.  If patient were to try to leave would need to be placed on a 72-hour hold.  -At this point, I do not see any clear infection or focal neurologic issue as etiology of her symptoms.  I do wonder if the recent trip to [place], stress of getting ready to move to [place] may have triggered this episode.  -Neurology evaluated patient on 6/14.  Autoimmune encephalitis panel ordered along with MRI brain. Unfortunately, pt is not willing to have MRI of brain so will discontinue order until patient is more cooperative.    -Psych consulted.   Scheduled zyprexa with prn dosing. Remeron prn for insomnia. Haldol IV prn if refuses PO.  Will need ongoing psych assessment.  -PT consulted as pt notes that she needed someone with her while walking halls.      #History of hypothyroidism: Continue home LT4  #History of HL: Continue statin     DVT Prophylaxis: Pneumatic Compression Devices  Dispo: Pt still quite paranoid, anxious and tangential. Ongoing psych assessment. Follow up encephalitis panel. If pt willing to have MRI of brain can be completed.   Code Status:  Full Code.          Diet: Advance Diet as " "Tolerated: Regular Diet Adult; Regular Diet Adult    DVT Prophylaxis: Pneumatic Compression Devices  Jerez Catheter: Not present  Lines: None     Cardiac Monitoring: None  Code Status: Full Code      Clinically Significant Risk Factors                               # Overweight: Estimated body mass index is 29.56 kg/m  as calculated from the following:    Height as of this encounter: 1.6 m (5' 3\").    Weight as of this encounter: 75.7 kg (166 lb 14.2 oz).        # Financial/Environmental Concerns: none         Disposition Plan     Medically Ready for Discharge: Anticipated in 2-4 Days        Garry Loera MD      Hospitalist Service  [facility]  Securely message with [employer] (more info)  Text page via [facility] Paging/Directory   ______________________________________________________________________    Interval History   Denies pain, she did not sleep overnight per RN notes. Reports good appetite, no other physical complaints.   Psychiatry consulted and increased dose of meds, Patient agrees  to have brain MRI, sedation ordered. Needs sitter in the room    Physical Exam   Vital Signs: Temp: 97.6  F (36.4  C) Temp src: Oral BP: 127/77 Pulse: 73   Resp: 20 SpO2: 97 % O2 Device: None (Room air)    Weight: 166 lbs 14.21 oz    Constitutional: awake, alert, cooperative, no apparent distress, and appears stated age  Respiratory: No increased work of breathing, good air exchange, clear to auscultation bilaterally, no crackles or wheezing  Cardiovascular: Normal apical impulse, regular rate and rhythm, normal S1 and S2, no S3 or S4, and no murmur noted  Psychiatry: Patient is talkative, tangential and delusional. She is incoherently making statements of associations nonsensical.  She is calm and cooperative, not aggressive.      Medical Decision Making       42 MINUTES SPENT BY ME on the date of service doing chart review, history, exam, documentation & further activities per the note.      Data     I " have personally reviewed the following data over the past 24 hrs:    9.7  \   14.7   / 231     N/A N/A N/A /  N/A   N/A N/A N/A \       Imaging results reviewed over the past 24 hrs:   No results found for this or any previous visit (from the past 24 hour(s)).

## 2024-06-19 ENCOUNTER — APPOINTMENT (OUTPATIENT)
Dept: GENERAL RADIOLOGY | Facility: CLINIC | Age: 74
DRG: 071 | End: 2024-06-19
Attending: INTERNAL MEDICINE
Payer: MEDICARE

## 2024-06-19 PROCEDURE — 250N000013 HC RX MED GY IP 250 OP 250 PS 637: Performed by: INTERNAL MEDICINE

## 2024-06-19 PROCEDURE — 250N000013 HC RX MED GY IP 250 OP 250 PS 637: Performed by: HOSPITALIST

## 2024-06-19 PROCEDURE — 250N000013 HC RX MED GY IP 250 OP 250 PS 637

## 2024-06-19 PROCEDURE — 250N000011 HC RX IP 250 OP 636: Mod: JZ | Performed by: INTERNAL MEDICINE

## 2024-06-19 PROCEDURE — 120N000001 HC R&B MED SURG/OB

## 2024-06-19 PROCEDURE — 999N000147 HC STATISTIC PT IP EVAL DEFER

## 2024-06-19 PROCEDURE — 99232 SBSQ HOSP IP/OBS MODERATE 35: CPT | Performed by: INTERNAL MEDICINE

## 2024-06-19 PROCEDURE — 71045 X-RAY EXAM CHEST 1 VIEW: CPT

## 2024-06-19 RX ORDER — FOLIC ACID 1 MG/1
1 TABLET ORAL DAILY
Status: DISCONTINUED | OUTPATIENT
Start: 2024-06-19 | End: 2024-06-20 | Stop reason: HOSPADM

## 2024-06-19 RX ORDER — MULTIVITAMIN,THERAPEUTIC
1 TABLET ORAL DAILY
Status: DISCONTINUED | OUTPATIENT
Start: 2024-06-19 | End: 2024-06-20 | Stop reason: HOSPADM

## 2024-06-19 RX ADMIN — MICONAZOLE NITRATE: 20 POWDER TOPICAL at 21:09

## 2024-06-19 RX ADMIN — LEVOTHYROXINE SODIUM 88 MCG: 0.09 TABLET ORAL at 08:11

## 2024-06-19 RX ADMIN — ATORVASTATIN CALCIUM 20 MG: 20 TABLET, FILM COATED ORAL at 08:11

## 2024-06-19 RX ADMIN — SENNOSIDES AND DOCUSATE SODIUM 2 TABLET: 50; 8.6 TABLET ORAL at 10:44

## 2024-06-19 RX ADMIN — OLANZAPINE 7.5 MG: 5 TABLET, ORALLY DISINTEGRATING ORAL at 21:08

## 2024-06-19 RX ADMIN — THIAMINE HCL TAB 100 MG 100 MG: 100 TAB at 14:31

## 2024-06-19 RX ADMIN — HALOPERIDOL LACTATE 2 MG: 5 INJECTION, SOLUTION INTRAMUSCULAR at 23:15

## 2024-06-19 RX ADMIN — FOLIC ACID 1 MG: 1 TABLET ORAL at 14:31

## 2024-06-19 RX ADMIN — THERA TABS 1 TABLET: TAB at 14:30

## 2024-06-19 ASSESSMENT — ACTIVITIES OF DAILY LIVING (ADL)
ADLS_ACUITY_SCORE: 30
ADLS_ACUITY_SCORE: 27
ADLS_ACUITY_SCORE: 30
ADLS_ACUITY_SCORE: 27
ADLS_ACUITY_SCORE: 30
ADLS_ACUITY_SCORE: 27
ADLS_ACUITY_SCORE: 30
ADLS_ACUITY_SCORE: 30
ADLS_ACUITY_SCORE: 27
DEPENDENT_IADLS:: INDEPENDENT
ADLS_ACUITY_SCORE: 30
ADLS_ACUITY_SCORE: 27
ADLS_ACUITY_SCORE: 27
ADLS_ACUITY_SCORE: 30

## 2024-06-19 NOTE — CONSULTS
Care Management Initial Consult    General Information  Assessment completed with: Patient, Children, Samantha and Tom  Type of CM/SW Visit: Initial Assessment    Primary Care Provider verified and updated as needed: Yes   Readmission within the last 30 days:        Reason for Consult: discharge planning  Advance Care Planning: Advance Care Planning Reviewed: no concerns identified          Communication Assessment  Patient's communication style: spoken language (English or Bilingual)    Hearing Difficulty or Deaf: no   Wear Glasses or Blind: yes    Cognitive  Cognitive/Neuro/Behavioral: .WDL except  Level of Consciousness: alert  Arousal Level: opens eyes spontaneously  Orientation: oriented x 4  Mood/Behavior: calm, cooperative  Best Language: 0 - No aphasia  Speech: clear    Living Environment:   People in home: alone     Current living Arrangements: house      Able to return to prior arrangements: no       Family/Social Support:  Care provided by: self  Provides care for: no one  Marital Status:   Children, Neighbor (Cousin)          Description of Support System: Supportive, Involved    Support Assessment: Adequate social supports, Adequate family and caregiver support    Current Resources:   Patient receiving home care services: No     Community Resources: None  Equipment currently used at home: none  Supplies currently used at home: None    Employment/Financial:  Employment Status: retired        Financial Concerns: none   Referral to Financial Worker: No       Does the patient's insurance plan have a 3 day qualifying hospital stay waiver?  No    Lifestyle & Psychosocial Needs:  Social Determinants of Health     Food Insecurity: Not on file   Depression: Not at risk (11/27/2023)    Received from [employer]    PHQ-2     PHQ-2 Score: 0   Housing Stability: Not on file   Tobacco Use: Medium Risk (11/22/2023)    Received from [employer]    Patient History     Smoking Tobacco Use: Former      Smokeless Tobacco Use: Never     Passive Exposure: Not on file   Financial Resource Strain: Not on file   Alcohol Use: Unknown (11/24/2020)    [facility]C     Frequency of Alcohol Consumption: 2-3 times a week     Average Number of Drinks: 1 or 2     Frequency of Binge Drinking: Not on file   Transportation Needs: Not on file   Physical Activity: Not on file   Interpersonal Safety: Not on file   Stress: Not on file   Social Connections: Not on file   Health Literacy: Not on file       Functional Status:  Prior to admission patient needed assistance:   Dependent ADLs:: Independent  Dependent IADLs:: Independent       Mental Health Status:  Mental Health Status: No Current Concerns       Chemical Dependency Status:  Chemical Dependency Status: No Current Concerns             Values/Beliefs:  Spiritual, Cultural Beliefs, Anabaptism Practices, Values that affect care: no  Description of Beliefs that Will Affect Care: Temple       Values/Beliefs Comment: Temple    Additional Information:  Sw spoke with the pt's son Tom (Lives in [place]) regarding the pt's discharge.  Tom said that a couple of weeks ago, they were all together on a vacation in [place].  He said that just a couple of months ago, she traveled to [facility] by herself.  He said that at baseline, his mom is usually very meticulous and independent.  She still drives at baseline.  When he most recently spoke with the pt on the phone, he noticed that things were off.  He eventually asked their cousin to stop by and check on the pt.  Tom said that the per their cousin, Batsheva, the pt's language was off (she was swearing and she never swears) and she was very paranoid.  Tom flew in from [place] and noticed several additional abnormalities with his mom.  He said that she had all of her faucets were running, but luckily the overflow valves were working.  Her apartment was in a disarray, which is not normal for the  pt.  Tom is in [place] for the time being, but will need to return to [place] where his family is.  He is aware that the pt cannot return home independently at this time.  He is unsure of how long he will be able to stay.  He addressed the concern of him staying with her is that she will not allow him to help her with any personal care needs.    Sw discussed alternative placement options such as LTC and [facility] placement.  He is further looking into this.  She does have a long-term care policy.  Sw addressed Tom's questions and concerns.  He states that although the pt has shown improvement, she is still not at baseline and is fluctuating.    Sw will continue with discharge planning and will be available as needed until discharge.      SARITHA Ruiz, [facility]  Inpatient Care Coordination  [facility]  [phone]

## 2024-06-19 NOTE — PLAN OF CARE
"Goal Outcome Evaluation:      Plan of Care Reviewed With: patient    Overall Patient Progress: improvingOverall Patient Progress: improving    Outcome Evaluation: Slept most of the shift, sitter remained at bedside, no concerning behaviors.  To Do:  End of Shift Summary  For vital signs and complete assessments, please see documentation flowsheets.     Pertinent assessments: Assumed cares @ 1473-3096. Pt A&O, Forgetful at times. Disorganized thought process per sitter. Sleeping without behavioral concerns on this shift. Sitter at bedside. Pt sleeping well throughout the evening/night. PIV saline locked.     Major Shift Events: uneventful     Treatment Plan: Psych/neuro following, encourage sleep.     Bedside Nurse: Yanni Nguyen RN   Problem: Adult Inpatient Plan of Care  Goal: Plan of Care Review  Description: The Plan of Care Review/Shift note should be completed every shift.  The Outcome Evaluation is a brief statement about your assessment that the patient is improving, declining, or no change.  This information will be displayed automatically on your shift  note.  Outcome: Progressing  Flowsheets (Taken 6/19/2024 0559)  Outcome Evaluation: Slept most of the shift, sitter remained at bedside, no concerning behaviors.  Plan of Care Reviewed With: patient  Overall Patient Progress: improving  Goal: Patient-Specific Goal (Individualized)  Description: You can add care plan individualizations to a care plan. Examples of Individualization might be:  \"Parent requests to be called daily at 9am for status\", \"I have a hard time hearing out of my right ear\", or \"Do not touch me to wake me up as it startles  me\".  Outcome: Progressing  Goal: Absence of Hospital-Acquired Illness or Injury  Outcome: Progressing  Intervention: Identify and Manage Fall Risk  Recent Flowsheet Documentation  Taken 6/19/2024 0049 by Yanni Nguyen, RN  Safety Promotion/Fall Prevention:   bedside attendant   assistive device/personal items " within reach   activity supervised  Intervention: Prevent Skin Injury  Recent Flowsheet Documentation  Taken 6/19/2024 0049 by Yanni Nguyen RN  Body Position: position changed independently  Intervention: Prevent and Manage VTE (Venous Thromboembolism) Risk  Recent Flowsheet Documentation  Taken 6/19/2024 0049 by Yanni Nguyen RN  VTE Prevention/Management: SCDs (sequential compression devices) off  Goal: Optimal Comfort and Wellbeing  Outcome: Progressing  Goal: Readiness for Transition of Care  Outcome: Progressing     Problem: Electrolyte Imbalance  Goal: Electrolyte Balance  Outcome: Progressing     Problem: Fatigue  Goal: Improved Activity Tolerance  Outcome: Progressing  Intervention: Promote Improved Energy  Recent Flowsheet Documentation  Taken 6/19/2024 0049 by Yanni Nguyen RN  Activity Management: activity adjusted per tolerance     Problem: Fall Injury Risk  Goal: Absence of Fall and Fall-Related Injury  Outcome: Progressing  Intervention: Identify and Manage Contributors  Recent Flowsheet Documentation  Taken 6/19/2024 0049 by Yanni Nguyen RN  Medication Review/Management: medications reviewed  Intervention: Promote Injury-Free Environment  Recent Flowsheet Documentation  Taken 6/19/2024 0049 by Yanni Nguyen RN  Safety Promotion/Fall Prevention:   bedside attendant   assistive device/personal items within reach   activity supervised

## 2024-06-19 NOTE — PLAN OF CARE
"To Do:  End of Shift Summary  For vital signs and complete assessments, please see documentation flowsheets.     Pertinent assessments: Pt A&O, but forgetful at times. Disorganized thought process, but no other behaviors noted this shift. Pt ambulated in hallways with staff and son frequently. Sitter at bedside. Pt sleeping well throughout the evening/night.   Major Shift Events: uneventful     Treatment Plan: Psych/neuro following, encourage sleep.     Bedside Nurse: Madisyn Bae RN     Goal Outcome Evaluation:      Plan of Care Reviewed With: patient, child    Overall Patient Progress: improvingOverall Patient Progress: improving    Outcome Evaluation: Pt sleeping most of shift. Sitter at bedside.      Problem: Adult Inpatient Plan of Care  Goal: Plan of Care Review  Description: The Plan of Care Review/Shift note should be completed every shift.  The Outcome Evaluation is a brief statement about your assessment that the patient is improving, declining, or no change.  This information will be displayed automatically on your shift  note.  Outcome: Progressing  Flowsheets (Taken 6/18/2024 1125)  Outcome Evaluation: Pt sleeping most of shift. Sitter at bedside.  Plan of Care Reviewed With:   patient   child  Overall Patient Progress: improving  Goal: Patient-Specific Goal (Individualized)  Description: You can add care plan individualizations to a care plan. Examples of Individualization might be:  \"Parent requests to be called daily at 9am for status\", \"I have a hard time hearing out of my right ear\", or \"Do not touch me to wake me up as it startles  me\".  Outcome: Progressing  Goal: Absence of Hospital-Acquired Illness or Injury  Outcome: Progressing  Intervention: Identify and Manage Fall Risk  Recent Flowsheet Documentation  Taken 6/18/2024 1800 by Opal Bae RN  Safety Promotion/Fall Prevention:   bedside attendant   assistive device/personal items within reach   activity supervised   increase " visualization of patient   clutter free environment maintained   room door open   room organization consistent  Intervention: Prevent and Manage VTE (Venous Thromboembolism) Risk  Recent Flowsheet Documentation  Taken 6/18/2024 1800 by Opal Bae RN  VTE Prevention/Management: SCDs (sequential compression devices) off  Goal: Optimal Comfort and Wellbeing  Outcome: Progressing  Goal: Readiness for Transition of Care  Outcome: Progressing     Problem: Electrolyte Imbalance  Goal: Electrolyte Balance  Outcome: Progressing     Problem: Fatigue  Goal: Improved Activity Tolerance  Outcome: Progressing     Problem: Fall Injury Risk  Goal: Absence of Fall and Fall-Related Injury  Outcome: Progressing  Intervention: Identify and Manage Contributors  Recent Flowsheet Documentation  Taken 6/18/2024 1800 by Opal Bae RN  Medication Review/Management: medications reviewed  Intervention: Promote Injury-Free Environment  Recent Flowsheet Documentation  Taken 6/18/2024 1800 by Opal Bae RN  Safety Promotion/Fall Prevention:   bedside attendant   assistive device/personal items within reach   activity supervised   increase visualization of patient   clutter free environment maintained   room door open   room organization consistent

## 2024-06-19 NOTE — PROGRESS NOTES
Lymphedema therapy orders received. Chart review and met with pt. Pt has been mobilizing well with SBA, minimal LE swelling noted. No acute IP lymph needs identified. Will defer and complete orders at this time.

## 2024-06-19 NOTE — PLAN OF CARE
"End of Shift Summary  For vital signs and complete assessments, please see documentation flowsheets.     Pertinent assessments: Pt A&Ox4, up with SBA. No longer suspicious of staff, calm and cooperative. Remains somewhat distracted and rambles, but redirectable. Showered. Had x1 BM. VSS. Tolerating regular diet.    Major Shift Events: bedside attendant discontinued    Treatment Plan: Psych consult, SW consult, discharge home        Problem: Adult Inpatient Plan of Care  Goal: Plan of Care Review  Description: The Plan of Care Review/Shift note should be completed every shift.  The Outcome Evaluation is a brief statement about your assessment that the patient is improving, declining, or no change.  This information will be displayed automatically on your shift  note.  Outcome: Progressing  Flowsheets (Taken 6/19/2024 1442)  Outcome Evaluation: Slept last night, sitter discontinued  Plan of Care Reviewed With:   patient   child  Overall Patient Progress: improving  Goal: Patient-Specific Goal (Individualized)  Description: You can add care plan individualizations to a care plan. Examples of Individualization might be:  \"Parent requests to be called daily at 9am for status\", \"I have a hard time hearing out of my right ear\", or \"Do not touch me to wake me up as it startles  me\".  Outcome: Progressing  Goal: Absence of Hospital-Acquired Illness or Injury  Outcome: Progressing  Intervention: Identify and Manage Fall Risk  Recent Flowsheet Documentation  Taken 6/19/2024 0815 by Maricel Lancaster RN  Safety Promotion/Fall Prevention: lighting adjusted  Intervention: Prevent and Manage VTE (Venous Thromboembolism) Risk  Recent Flowsheet Documentation  Taken 6/19/2024 0815 by Maricel Lancaster RN  VTE Prevention/Management: compression stockings off  Goal: Optimal Comfort and Wellbeing  Outcome: Progressing  Goal: Readiness for Transition of Care  Outcome: Progressing     Problem: Electrolyte Imbalance  Goal: Electrolyte " Balance  Outcome: Progressing     Problem: Fatigue  Goal: Improved Activity Tolerance  Outcome: Progressing     Problem: Fall Injury Risk  Goal: Absence of Fall and Fall-Related Injury  Outcome: Progressing  Intervention: Identify and Manage Contributors  Recent Flowsheet Documentation  Taken 6/19/2024 0815 by Maricel Lancaster RN  Medication Review/Management: medications reviewed  Intervention: Promote Injury-Free Environment  Recent Flowsheet Documentation  Taken 6/19/2024 0815 by Maricel Lancaster RN  Safety Promotion/Fall Prevention: lighting adjusted   Goal Outcome Evaluation:      Plan of Care Reviewed With: patient, child    Overall Patient Progress: improvingOverall Patient Progress: improving    Outcome Evaluation: Slept last night, sitter discontinued       No midline C, T, L ttp. FROMbilat UE and LE, no bony or joint ttp.

## 2024-06-19 NOTE — PROGRESS NOTES
[facility]    Medicine Progress Note - Hospitalist Service    Date of Admission:  6/13/2024    Assessment & Plan     Maribell Mendoza is a 74 year old female w a history of hyperlipidemia, osteoporosis, hypothyroidism and a small goiter due to Hashimoto's thyroiditis who was brought in by her son due to altered mental status.       Paranoia with delusions.    Anxiety.  Acute encephalopathy of undetermined etiology.  The patient is highly functional at baseline and lives independently in a duplex.  She is very meticulous and organized.  Her son lives in [place] and 2 weeks ago they went to [place] for 5 days in [facility].  He did not notice anything different about her at that time.  She was her usual self and interacting quite normally with his family.  Interestingly, in the last 1 to 2 months there have been plans for her to move out of her duplex down to [place] to live with her son and his family.    -About 1.5 weeks ago, after coming back from [place], patient was making paranoid statements about artificial intelligence spying on her.  Her son at that time tried to reassure her.  She is reportedly also been anxious/paranoid with her friends/ over the last week.  Her neighbor had also noticed that she was not following her usual routine.  She was leaving her outside lights on all day.  Her neighbor would noticed that her anterior lights were on at 2 in the morning.  Her neighbor relayed this to her son who then came in to see her earlier this week.  -Seen in consultation by neurology.  -Seen in consultation by psychiatry.  -Unclear underlying etiology.  -Has been started on scheduled olanzapine 7.5 mg at bedtime.  -Additional olanzapine available if needed.  -Start thiamine 100 mg a day, folic acid 1 mg a day, multivitamin.  -Will likely need continued supervision in the outpatient setting.  -Appreciate social work assistance.  -Check chest x-ray.    Hypothyroidism.  -Continue  "levothyroxine.    Hyperlipidemia.  -Continue atorvastatin 20 mg a day.    Acute kidney injury.  -Creatinine elevated at 1.1.  -Avoid nephrotoxins as able.  -Recheck metabolic panel intermittently.            Diet: Advance Diet as Tolerated: Regular Diet Adult; Regular Diet Adult    DVT Prophylaxis: Pneumatic Compression Devices  Jerez Catheter: Not present  Lines: None     Cardiac Monitoring: None  Code Status: Full Code      Clinically Significant Risk Factors           # Hypercalcemia: Highest Ca = 10.2 mg/dL in last 2 days, will monitor as appropriate                     # Overweight: Estimated body mass index is 29.56 kg/m  as calculated from the following:    Height as of this encounter: 1.6 m (5' 3\").    Weight as of this encounter: 75.7 kg (166 lb 14.2 oz).      # Financial/Environmental Concerns: none         Disposition Plan     Medically Ready for Discharge: Anticipated Tomorrow             Wilder Ortiz DO  Hospitalist Service  [facility]  Securely message with "[employer] (more info)  Text page via [employer] Paging/Directory   ______________________________________________________________________    Interval History   Denies chest pain, shortness of breath, fevers, chills, nausea, vomiting.    Physical Exam   Vital Signs: Temp: 97.4  F (36.3  C) Temp src: Oral BP: (!) 153/64 Pulse: 70   Resp: 16 SpO2: 92 % O2 Device: None (Room air)    Weight: 166 lbs 14.21 oz    Gen:  NAD, A&Ox2 to person and may be place.  Not oriented to time.  Tangential thinking.  Eyes:  PERRL, sclera anicteric.  OP:  MMM, no lesions.  Neck:  Supple.  CV:  Regular, +2/6 murmur.  Lung:  CTA b/l, normal effort.  Ab:  +BS, soft.  Skin:  Warm, dry to touch.  No rash.  Ext:  No pitting edema LE b/l.      Medical Decision Making       45 MINUTES SPENT BY ME on the date of service doing chart review, history, exam, documentation & further activities per the note.      Data         Imaging results reviewed over the past " 24 hrs:   Recent Results (from the past 24 hour(s))   MR Brain w/o & w Contrast    Narrative     MR BRAIN W/O & W CONTRAST 6/18/2024 2:55 PM    Provided History: Delusion, encepahlopatic.    Comparison: Head CT 6/13/2024.    Technique: Multiplanar multisequence brain MRI without and with  contrast.    Contrast: 7.5 mL Gadavist     Findings:  There is no mass effect, midline shift, or evidence of intracranial  hemorrhage. The ventricles are proportionate to the cerebral sulci.  Normal major vascular intracranial flow-voids. Mild generalized  cerebral volume loss.    Postcontrast images demonstrate no abnormal intracranial enhancement.    No abnormality of the skull marrow signal. Mild scattered paranasal  sinus mucosal thickening. Mastoid air cells are relatively clear.  Bilateral pseudophakia.       Impression    Impression:  1. No acute intracranial pathology.  2. No abnormal contrast enhancement.    MONICA PARK MD         SYSTEM ID:  [id number]

## 2024-06-20 VITALS
SYSTOLIC BLOOD PRESSURE: 138 MMHG | WEIGHT: 166.89 LBS | TEMPERATURE: 97.4 F | BODY MASS INDEX: 29.57 KG/M2 | RESPIRATION RATE: 20 BRPM | HEIGHT: 63 IN | OXYGEN SATURATION: 98 % | HEART RATE: 65 BPM | DIASTOLIC BLOOD PRESSURE: 66 MMHG

## 2024-06-20 LAB
ANION GAP SERPL CALCULATED.3IONS-SCNC: 12 MMOL/L (ref 7–15)
BUN SERPL-MCNC: 18.5 MG/DL (ref 8–23)
CALCIUM SERPL-MCNC: 9.1 MG/DL (ref 8.8–10.2)
CHLORIDE SERPL-SCNC: 103 MMOL/L (ref 98–107)
CREAT SERPL-MCNC: 0.78 MG/DL (ref 0.51–0.95)
DEPRECATED HCO3 PLAS-SCNC: 24 MMOL/L (ref 22–29)
EGFRCR SERPLBLD CKD-EPI 2021: 79 ML/MIN/1.73M2
FOLATE SERPL-MCNC: >40 NG/ML (ref 4.6–34.8)
GLUCOSE SERPL-MCNC: 156 MG/DL (ref 70–99)
POTASSIUM SERPL-SCNC: 4 MMOL/L (ref 3.4–5.3)
SODIUM SERPL-SCNC: 139 MMOL/L (ref 135–145)
VIT B12 SERPL-MCNC: 1707 PG/ML (ref 232–1245)

## 2024-06-20 PROCEDURE — 82746 ASSAY OF FOLIC ACID SERUM: CPT | Performed by: INTERNAL MEDICINE

## 2024-06-20 PROCEDURE — 82607 VITAMIN B-12: CPT | Performed by: INTERNAL MEDICINE

## 2024-06-20 PROCEDURE — 250N000013 HC RX MED GY IP 250 OP 250 PS 637: Performed by: INTERNAL MEDICINE

## 2024-06-20 PROCEDURE — 250N000013 HC RX MED GY IP 250 OP 250 PS 637: Performed by: HOSPITALIST

## 2024-06-20 PROCEDURE — 36415 COLL VENOUS BLD VENIPUNCTURE: CPT | Performed by: INTERNAL MEDICINE

## 2024-06-20 PROCEDURE — 99239 HOSP IP/OBS DSCHRG MGMT >30: CPT | Performed by: INTERNAL MEDICINE

## 2024-06-20 PROCEDURE — 80048 BASIC METABOLIC PNL TOTAL CA: CPT | Performed by: INTERNAL MEDICINE

## 2024-06-20 RX ORDER — FOLIC ACID 1 MG/1
1 TABLET ORAL DAILY
Qty: 30 TABLET | Refills: 0 | Status: SHIPPED | OUTPATIENT
Start: 2024-06-21

## 2024-06-20 RX ORDER — OLANZAPINE 2.5 MG/1
2.5 TABLET, FILM COATED ORAL 2 TIMES DAILY PRN
Qty: 60 TABLET | Refills: 0 | Status: ON HOLD | OUTPATIENT
Start: 2024-06-20 | End: 2024-07-05

## 2024-06-20 RX ORDER — OLANZAPINE 7.5 MG/1
7.5 TABLET, FILM COATED ORAL AT BEDTIME
Qty: 30 TABLET | Refills: 0 | Status: ON HOLD | OUTPATIENT
Start: 2024-06-20 | End: 2024-07-05

## 2024-06-20 RX ORDER — LANOLIN ALCOHOL/MO/W.PET/CERES
100 CREAM (GRAM) TOPICAL DAILY
Qty: 30 TABLET | Refills: 0 | Status: SHIPPED | OUTPATIENT
Start: 2024-06-21

## 2024-06-20 RX ADMIN — FOLIC ACID 1 MG: 1 TABLET ORAL at 08:16

## 2024-06-20 RX ADMIN — LEVOTHYROXINE SODIUM 88 MCG: 0.09 TABLET ORAL at 08:16

## 2024-06-20 RX ADMIN — THERA TABS 1 TABLET: TAB at 08:15

## 2024-06-20 RX ADMIN — ATORVASTATIN CALCIUM 20 MG: 20 TABLET, FILM COATED ORAL at 08:16

## 2024-06-20 RX ADMIN — THIAMINE HCL TAB 100 MG 100 MG: 100 TAB at 08:16

## 2024-06-20 ASSESSMENT — ACTIVITIES OF DAILY LIVING (ADL)
ADLS_ACUITY_SCORE: 30
ADLS_ACUITY_SCORE: 25
ADLS_ACUITY_SCORE: 25
ADLS_ACUITY_SCORE: 30
ADLS_ACUITY_SCORE: 30
ADLS_ACUITY_SCORE: 25
ADLS_ACUITY_SCORE: 26
ADLS_ACUITY_SCORE: 30
ADLS_ACUITY_SCORE: 25
ADLS_ACUITY_SCORE: 30
ADLS_ACUITY_SCORE: 25
ADLS_ACUITY_SCORE: 25
ADLS_ACUITY_SCORE: 30

## 2024-06-20 NOTE — CONSULTS
"CLINICAL NUTRITION SERVICES  -  ASSESSMENT NOTE      Recommendations:   - Continue diet as ordered.  Appreciate any PO intake recordings in flowsheets.  - Will continue monitoring PO intakes acutely and if any consistent decline noted, can plan to schedule/offer oral supplements at meals.       MALNUTRITION:  % Weight Loss:  None noted  % Intake:  Decreased intake does not meet criteria for malnutrition --> acutely  Subcutaneous Fat Loss:  None observed   Muscle Loss:  None observed  Fluid Retention: Trace, LEs --> weak indicator, not using at this time    Malnutrition Diagnosis: Patient does not meet two of the above criteria necessary for diagnosing malnutrition          REASON FOR ASSESSMENT  Maribell Mendoza is a 74 year old female seen by Registered Dietitian for length of stay.    PMH of: Hypothyroidism, goiter d/t Hashimoto's thyroiditis.    Admit 2/2: Brought in by family d/t grady NEAL.    NUTRITION HISTORY  - Information obtained from chart as patient has been confused.  Noted code 21 called this AM.    - Son was in room during visit but does not know how eating was going PTA.  He [place] different [place] and flew here when his mom was admitted.  - Allergies: NKFA.      CURRENT NUTRITION ORDERS  Diet Order:     Regular    Current Intake/Tolerance:  Flowsheet review shows mostly % intakes throughout admission w/ 25-50% intakes documented on 6/14 and 6/15, mentation-related?  Has consistently been receiving meals TID per meal system review.     Son in room reports eating has been going well when he's been present at the hospital, which has been almost everyday during the day since admission.  He has no nutrition concerns at this time.  Checked-in w/ nursing assistant who reports eating went well this AM.       ANTHROPOMETRICS  Height: 5' 3\"  Weight: 166 lbs 14.21 oz  Body mass index is 29.56 kg/m .  Weight Status:  Overweight BMI 25-29.9  Weight History:  Wt Readings from Last 10 Encounters: "   06/14/24 75.7 kg (166 lb 14.2 oz)   10/03/23 73 kg (161 lb)     - Has trace edema to LEs documented.  - Also w/ wt o f167# from 11/27/2023.  - Even w/ potential for slight masking w/ trace edema, wt stable or increased when compared to 10/2023 and 11/2023 trends available.  Care everywhere reviewed and there were no other recent wt trends.  - Son is not sure of wt trending PTA.    LABS: Reviewed.    MEDICATIONS: Reviewed:  - Folic acid, MVI/M, thiamine    GI: Stooling patterns noted w/ documented BM on 6/19.    SKIN: No current documentation of PI.       ASSESSED NUTRITION NEEDS PER APPROVED PRACTICE GUIDELINES:    Dosing Weight 76 kg  Estimated Energy Needs: >/=25 Kcal/Kg  Justification: maintenance  Estimated Protein Needs: 1-1.2 g pro/Kg  Justification: preservation of lean body mass  Estimated Fluid Needs: per MD      NUTRITION DIAGNOSIS:  Predicted inadequate nutrient intake (energy/protein) related to potential for decline in PO intakes acutely pending appetite, LOS, and mentation.     NUTRITION INTERVENTIONS  Recommendations / Nutrition Prescription  See above.    Implementation  Nutrition education: Not appropriate at this time due to patient condition.    Collaboration and Referral of Nutrition care: Discussed POC with team during rounds and PO intakes w/ NA.    Nutrition goals:  PO intakes of 75% meals TID (on average).      MONITORING AND EVALUATION:  Progress towards goals will be monitored and evaluated per protocol and Practice Guidelines          Colleen Felix RDN, LD  Clinical Dietitian  3rd floor/ICU: [phone]  All other floors: [phone]  Weekend/holiday: [phone]  Office: [phone]

## 2024-06-20 NOTE — PLAN OF CARE
"End of Shift Summary  For vital signs and complete assessments, please see documentation flowsheets.     Pertinent assessments: Pt A&Ox4, up with SBA.suspicious of staff, calm and cooperative. Remains somewhat distracted and rambles, but redirectable. Neuro assessments normal except for delusions of persecution, paranoia, and unusual comments. Had x1 BM. VSS. Tolerating regular diet.    Major Shift Events: new PIV placed and SL    Treatment Plan: Psych consult, SW consult, discharge home    Bedside Nurse: jeffy delcid RN       Goal Outcome Evaluation:      Problem: Adult Inpatient Plan of Care  Goal: Plan of Care Review  Description: The Plan of Care Review/Shift note should be completed every shift.  The Outcome Evaluation is a brief statement about your assessment that the patient is improving, declining, or no change.  This information will be displayed automatically on your shift  note.  Outcome: Progressing  Flowsheets (Taken 6/19/2024 2134)  Outcome Evaluation: odd behavior and comments continue but patient is agreeable and calm, oriented to self, and place, personal needs met  Plan of Care Reviewed With:   patient   child  Goal: Patient-Specific Goal (Individualized)  Description: You can add care plan individualizations to a care plan. Examples of Individualization might be:  \"Parent requests to be called daily at 9am for status\", \"I have a hard time hearing out of my right ear\", or \"Do not touch me to wake me up as it startles  me\".  Outcome: Progressing  Goal: Absence of Hospital-Acquired Illness or Injury  Outcome: Progressing  Intervention: Identify and Manage Fall Risk  Recent Flowsheet Documentation  Taken 6/19/2024 1600 by Jeffy Delcid RN  Safety Promotion/Fall Prevention:   clutter free environment maintained   increased rounding and observation   nonskid shoes/slippers when out of bed   room near nurse's station   room door open   room organization consistent   safety round/check " completed  Intervention: Prevent Skin Injury  Recent Flowsheet Documentation  Taken 6/19/2024 1600 by Jeffy Delcid RN  Body Position:   supine, head elevated   heels elevated  Intervention: Prevent and Manage VTE (Venous Thromboembolism) Risk  Recent Flowsheet Documentation  Taken 6/19/2024 1600 by Jeffy Delcid RN  VTE Prevention/Management:   compression stockings off   SCDs (sequential compression devices) off  Intervention: Prevent Infection  Recent Flowsheet Documentation  Taken 6/19/2024 1600 by Jeffy Delcid RN  Infection Prevention:   environmental surveillance performed   hand hygiene promoted   personal protective equipment utilized  Goal: Optimal Comfort and Wellbeing  Outcome: Progressing  Goal: Readiness for Transition of Care  Outcome: Progressing     Problem: Electrolyte Imbalance  Goal: Electrolyte Balance  Outcome: Progressing     Problem: Fatigue  Goal: Improved Activity Tolerance  Outcome: Progressing  Intervention: Promote Improved Energy  Recent Flowsheet Documentation  Taken 6/19/2024 1600 by Jeffy Delcid RN  Activity Management:   activity adjusted per tolerance   ambulated in room  Environmental Support:   caregiver consistency promoted   calm environment promoted   comfort object encouraged   environmental consistency promoted   personal routine supported   rest periods encouraged     Problem: Fall Injury Risk  Goal: Absence of Fall and Fall-Related Injury  Outcome: Progressing  Intervention: Identify and Manage Contributors  Recent Flowsheet Documentation  Taken 6/19/2024 1600 by Jeffy Delcid RN  Medication Review/Management: medications reviewed  Intervention: Promote Injury-Free Environment  Recent Flowsheet Documentation  Taken 6/19/2024 1600 by Jeffy Delcid RN  Safety Promotion/Fall Prevention:   clutter free environment maintained   increased rounding and observation   nonskid shoes/slippers when out of bed   room near nurse's station   room door  open   room organization consistent   safety round/check completed         Plan of Care Reviewed With: patient, child          Outcome Evaluation: odd behavior and comments continue but patient is agreeable and calm, oriented to self, and place, personal needs met

## 2024-06-20 NOTE — PLAN OF CARE
"End of Shift Summary  For vital signs and complete assessments, please see documentation flowsheets.     Pertinent assessments: Pt A&Ox4, often rambling, tangential thinking. Showered. Attendant at bedside. No combative behaviors, but suspicious of staff to a degree. No c/o pain or nausea. Ambulating with SBA.    Major Shift Events: none    Treatment Plan: Psych consult, neuro consult, SW, discharge planning, likely this afternoon      Problem: Adult Inpatient Plan of Care  Goal: Plan of Care Review  Description: The Plan of Care Review/Shift note should be completed every shift.  The Outcome Evaluation is a brief statement about your assessment that the patient is improving, declining, or no change.  This information will be displayed automatically on your shift  note.  Outcome: Progressing  Flowsheets (Taken 6/20/2024 1432)  Outcome Evaluation: Mentation clearer, agreeable to home with home care  Plan of Care Reviewed With:   patient   child  Overall Patient Progress: improving  Goal: Patient-Specific Goal (Individualized)  Description: You can add care plan individualizations to a care plan. Examples of Individualization might be:  \"Parent requests to be called daily at 9am for status\", \"I have a hard time hearing out of my right ear\", or \"Do not touch me to wake me up as it startles  me\".  Outcome: Progressing  Goal: Absence of Hospital-Acquired Illness or Injury  Outcome: Progressing  Intervention: Identify and Manage Fall Risk  Recent Flowsheet Documentation  Taken 6/20/2024 0815 by Maricel Lancaster RN  Safety Promotion/Fall Prevention: lighting adjusted  Intervention: Prevent and Manage VTE (Venous Thromboembolism) Risk  Recent Flowsheet Documentation  Taken 6/20/2024 0815 by Maricel Lancaster RN  VTE Prevention/Management: compression stockings off  Intervention: Prevent Infection  Recent Flowsheet Documentation  Taken 6/20/2024 0815 by Maricel Lancaster RN  Infection Prevention: environmental " surveillance performed  Goal: Optimal Comfort and Wellbeing  Outcome: Progressing  Goal: Readiness for Transition of Care  Outcome: Progressing     Problem: Electrolyte Imbalance  Goal: Electrolyte Balance  Outcome: Progressing     Problem: Fatigue  Goal: Improved Activity Tolerance  Outcome: Progressing     Problem: Fall Injury Risk  Goal: Absence of Fall and Fall-Related Injury  Outcome: Progressing  Intervention: Identify and Manage Contributors  Recent Flowsheet Documentation  Taken 6/20/2024 0815 by Maricel Lancaster RN  Medication Review/Management: medications reviewed  Intervention: Promote Injury-Free Environment  Recent Flowsheet Documentation  Taken 6/20/2024 0815 by Maricel Lancaster RN  Safety Promotion/Fall Prevention: lighting adjusted   Goal Outcome Evaluation:      Plan of Care Reviewed With: patient, child    Overall Patient Progress: improvingOverall Patient Progress: improving    Outcome Evaluation: Mentation clearer, agreeable to home with home care

## 2024-06-20 NOTE — DISCHARGE INSTRUCTIONS
ADDITIONAL SUPPORTS:  Call or text 660 - National Suicide & Crisis Lifeline - if you feel like you may be in crisis or having thoughts of suicide.    [place] [employer] [place] ([place]) provides support groups and educational programs. Visit www.namimn.org Helpline at 1-880.928.9194 or 770-877-2402 for further information.   [facility] ([place] [employer] [place]) improves the lives of children and adults with mental illnesses and their families by providing free classes on mental illnesses andsupport groups for adults with mental illnesses, parents and family members.   For more information:  Phone: [phone]  Toll free: 7-580-RPFZ-HELPS  Website: www.namihelps.org      The [place] [facility] provides a self-rn-ebzq approach to mental health recovery, support and wellness. Calls are answered by our team of professionally trained Certified Peer Specialists, who have first hand experience living with a mental health condition.   Open Monday-Saturday, 5pm to 10pm. Call [phone].       You may contact the [facility] for brief, short-term solution focused therapy support with your mental health goals. Call [phone] for more information or to schedule. (Virtual Appointments)          [facility]: Thank you for your interest in [facility]. Currently, patients are experiencing long waits for intake when referred within [facility]. Please know that you may contact your insurance carrier member services to learn more about scheduling in network therapy. Your insurance company will have lists of in network therapists that are not within [facility] and may have more immediate availability.       Get care started with an ongoing therapist by calling to schedule your intake at one of the following clinics:    [facility]: [phone]  [facility]  [phone]  [facility] [phone]  [facility] [phone]  [facility] [phone]  [facility] system  [phone]   On license of [facility] in [facility] [phone]  [facility] [phone]    Call an [facility] Behavioral Coordinator at [phone] for assistance in scheduling mental health appointments (Psychiatry/medication management, therapy, support groups, neuropsych testing, intake for programmatic care such as IOP/PHP program, etc.)

## 2024-06-20 NOTE — PLAN OF CARE
"Goal Outcome Evaluation:      Plan of Care Reviewed With: patient    Overall Patient Progress: no changeOverall Patient Progress: no change    Outcome Evaluation: rambling odd behavior, verbal abuse and cuss words, PRN haldol given. Pt refused to go back to room, code 21 activated, sitter remained at bedside.  To Do:  End of Shift Summary  For vital signs and complete assessments, please see documentation flowsheets.     Pertinent assessments: Assumed cares @ [id number]. Pt A&Ox4, up with SBA.suspicious of staff, calm and cooperative. Remains somewhat distracted and rambles, and non redirectable. Neuro assessments normal except for delusions of persecution, paranoia, and unusual comments. Had x1 BM. VSS. Tolerating regular diet.    Major Shift Events: Pt was combative, verbally abusive and refused to get back in her room. Code 21 activated, pt escorted back to her room, sitter provided and pt slept the rest of the night. Refused lab to be drawn this morning.       Treatment Plan: Psych consult, SW consult, discharge home    Bedside Nurse: Yanni Nguyen RN       Problem: Adult Inpatient Plan of Care  Goal: Plan of Care Review  Description: The Plan of Care Review/Shift note should be completed every shift.  The Outcome Evaluation is a brief statement about your assessment that the patient is improving, declining, or no change.  This information will be displayed automatically on your shift  note.  Outcome: Progressing  Flowsheets (Taken 6/20/2024 0606)  Outcome Evaluation: rambling odd behavior, verbal abuse and cuss words, PRN haldol given. Pt refused to go back to room, code 21 activated, sitter remained at bedside.  Plan of Care Reviewed With: patient  Overall Patient Progress: no change  Goal: Patient-Specific Goal (Individualized)  Description: You can add care plan individualizations to a care plan. Examples of Individualization might be:  \"Parent requests to be called daily at 9am for status\", \"I have a hard " "time hearing out of my right ear\", or \"Do not touch me to wake me up as it startles  me\".  Outcome: Progressing  Goal: Absence of Hospital-Acquired Illness or Injury  Outcome: Progressing  Intervention: Identify and Manage Fall Risk  Recent Flowsheet Documentation  Taken 6/19/2024 2337 by Yanni Nguyen RN  Safety Promotion/Fall Prevention:   activity supervised   assistive device/personal items within reach   bedside attendant   clutter free environment maintained  Intervention: Prevent Skin Injury  Recent Flowsheet Documentation  Taken 6/19/2024 2337 by Yanni Nguyen RN  Body Position: (pt refused to get back in her room.) other (see comments)  Intervention: Prevent and Manage VTE (Venous Thromboembolism) Risk  Recent Flowsheet Documentation  Taken 6/19/2024 2337 by Yanni Nguyen RN  VTE Prevention/Management: SCDs (sequential compression devices) off  Intervention: Prevent Infection  Recent Flowsheet Documentation  Taken 6/19/2024 2337 by Yanni Nguyen RN  Infection Prevention: environmental surveillance performed  Goal: Optimal Comfort and Wellbeing  Outcome: Progressing  Goal: Readiness for Transition of Care  Outcome: Progressing     Problem: Electrolyte Imbalance  Goal: Electrolyte Balance  Outcome: Progressing     Problem: Fatigue  Goal: Improved Activity Tolerance  Outcome: Progressing  Intervention: Promote Improved Energy  Recent Flowsheet Documentation  Taken 6/19/2024 2337 by Yanni Nguyen RN  Activity Management: activity adjusted per tolerance  Environmental Support: caregiver consistency promoted     Problem: Fall Injury Risk  Goal: Absence of Fall and Fall-Related Injury  Outcome: Progressing  Intervention: Identify and Manage Contributors  Recent Flowsheet Documentation  Taken 6/19/2024 2337 by Yanni Nguyen RN  Medication Review/Management: medications reviewed  Intervention: Promote Injury-Free Environment  Recent Flowsheet Documentation  Taken 6/19/2024 2337 by Patrick, " Ynani PLEITEZ RN  Safety Promotion/Fall Prevention:   activity supervised   assistive device/personal items within reach   bedside attendant   clutter free environment maintained

## 2024-06-20 NOTE — PROGRESS NOTES
Care Management Follow Up    Length of Stay (days): 7    Concerns to be Addressed: discharge planning     Patient plan of care discussed at interdisciplinary rounds: Yes    Anticipated Discharge Disposition: TBD, LTC vs [facility] vs Home with HC     Anticipated Discharge Services:  Possible HC  Anticipated Discharge DME:  None    Patient/family educated on Medicare website which has current facility and service quality ratings:  yes  Education Provided on the Discharge Plan:  yes  Patient/Family in Agreement with the Plan:  yes    Referrals Placed by CM/SW: post acute facilities    Additional Information:  Shana spoke with the pt's son Tom who said that he talked with Ruba at [facility] and he will be touring [facility] in [place] today.  He is working to determine the best next steps for the pt.  He reported to Shana that she is more clear today.  He is unsure if she will fully need jail or for how long.  He is also wondering about HC services that can come a few times a day to see the pt.  Shana provided him resources for HC agencies that can provide assistance for up to 24 hours/day.  Shana addressed his additional questions or concerns.  He already has the information for [facility].    Shana will continue with discharge planning and will be available as needed until discharge.    SARITHA Ruiz, [facility]  Inpatient Care Coordination  [facility]  [phone]

## 2024-06-20 NOTE — PROGRESS NOTES
Care Management Discharge Note    Discharge Date: 06/20/2024       Discharge Disposition:  Home    Discharge Services:  home care    Discharge DME:  None    Discharge Transportation: family or friend will provide - Pt's son    Private pay costs discussed: Not applicable    Does the patient's insurance plan have a 3 day qualifying hospital stay waiver?  No    PAS Confirmation Code:  N/A  Patient/family educated on Medicare website which has current facility and service quality ratings:  yes    Education Provided on the Discharge Plan:  yes  Persons Notified of Discharge Plans: Pt, Pt's son, [facility]  Patient/Family in Agreement with the Plan:  yes    Handoff Referral Completed: No    Additional Information:  Sw spoke with the pt and her son Tom who was at the bedside.  They have decided to go home with private pay  services instead of an nursing home.  Tom will be staying with the pt for the foreseeable future.  They have reached out to [facility], P: [phone] F: [phone], to provide private pay services for the pt.  They will do an assessment of the pt tomorrow at her house and begin services tomorrow.  Sw confirmed the pt's discharge with [facility] and faxed them the pt's discharge orders.  Sw addressed the pt and Tom's questions and concerns.  Sw informed them that if things do not go well at home, they should reach back out to [facility] for placement.    Sw will continue to be available as needed until discharge.      SARITHA Ruiz, [facility]  Inpatient Care Coordination  [facility]  [phone]

## 2024-06-20 NOTE — DISCHARGE SUMMARY
"[facility]  Hospitalist Discharge Summary      Date of Admission:  6/13/2024  Date of Discharge:  6/20/2024  Discharging Provider: Wilder Ortiz DO  Discharge Service: Hospitalist Service    Discharge Diagnoses     Paranoia with delusions.  Acute encephalopathy of undetermined etiology.  Anxiety.  Hypothyroidism.  Hyperlipidemia.  Acute kidney injury.    Clinically Significant Risk Factors     # Overweight: Estimated body mass index is 29.56 kg/m  as calculated from the following:    Height as of this encounter: 1.6 m (5' 3\").    Weight as of this encounter: 75.7 kg (166 lb 14.2 oz).       Follow-ups Needed After Discharge   Follow-up Appointments     Follow-up and recommended labs and tests       Follow up with primary care provider, LYNDON CHRISTIANSON, within 7   days for hospital follow- up.  The following labs/tests are recommended:   CBC and BMP in 7 days.  Follow-up with neurology within 4 weeks.    Follow-up with psychiatry within 4 weeks.            Discharge Disposition   Discharged to home with home care supervision.  Condition at discharge: [facility]    Hospital Course   Maribell Mendoza is a 74 year old female w a history of hyperlipidemia, osteoporosis, hypothyroidism and a small goiter due to Hashimoto's thyroiditis who was brought in by her son due to altered mental status.  She has been seen in consultation by neurology and psychiatry.  She has been started on olanzapine 7.5 mg daily at bedtime scheduled.  Additional olanzapine 2.5 mg 2 times a day as needed for agitation.  She does have a few tests that are still pending at time of discharge with a paraneoplastic autoantibody panel, B12 level, and folic acid levels.  She has been seen by social work and therapy.  Exact etiology of her acute encephalopathy is still unclear.  Mental status has improved mildly with addition of olanzapine.  Family has decided to take her home with additional home care service that they have " arranged for monitoring in the home setting.  She will need to follow-up with her primary care provider within 1 week.  Follow-up with neurology and psychiatry within the next 4 weeks.  Recheck labs with CBC and BMP in 1 week.  She should not drive with her current cognitive deficits.    Consultations This Hospital Stay   DIAGNOSTIC EVALUATION CENTER (DEC) ASSESSMENT ORDER  NEUROLOGY IP CONSULT  PSYCHIATRY IP CONSULT  PHYSICAL THERAPY ADULT IP CONSULT  PSYCHIATRY IP CONSULT  LYMPHEDEMA THERAPY IP CONSULT  CARE MANAGEMENT / SOCIAL WORK IP CONSULT  NEUROLOGY IP CONSULT  PSYCHIATRY IP CONSULT    Code Status   Full Code    Time Spent on this Encounter   I spent 45 minutes with Ms. Mendoza and working on discharge on 6/20/2024.       [place]ilder Ort[place] [address] MEDICAL SURGICAL  [address] [place] [address]  Phone: [phone]  Fax: [phone]  ______________________________________________________________________    Physical Exam   Vital Signs: Temp: 97.4  F (36.3  C) Temp src: Oral BP: 138/66 Pulse: 65   Resp: 20 SpO2: 98 % O2 Device: None (Room air)    Weight: 166 lbs 14.21 oz  Gen:  NAD, A&Ox1 to self and family.  Not oriented to place or time.  Eyes:  PERRL, sclera anicteric.  OP:  MMM, no lesions.  Neck:  Supple.  CV:  Regular, no murmurs.  Lung:  CTA b/l, normal effort.  Ab:  +BS, soft.  Skin:  Warm, dry to touch.  No rash.  Ext:  No pitting edema LE b/l.         Primary Care Physician   LYNDON CHRISTIANSON    Discharge Orders      Reason for your hospital stay    Acute encephalopathy.     Follow-up and recommended labs and tests     Follow up with primary care provider, LYNDON CHRISTIANSON, within 7 days for hospital follow- up.  The following labs/tests are recommended: CBC and BMP in 7 days.  Follow-up with neurology within 4 weeks.  Follow-up with psychiatry within 4 weeks.     Activity    Your activity upon discharge: no driving until current symptoms have  resolved.     Diet    Follow this diet upon discharge: Regular         Discharge Medications   Current Discharge Medication List        START taking these medications    Details   folic acid (FOLVITE) 1 MG tablet Take 1 tablet (1 mg) by mouth daily  Qty: 30 tablet, Refills: 0    Associated Diagnoses: Acute encephalopathy      !! OLANZapine (ZYPREXA) 2.5 MG tablet Take 1 tablet (2.5 mg) by mouth 2 times daily as needed (agitation)  Qty: 60 tablet, Refills: 0    Associated Diagnoses: Acute encephalopathy      !! OLANZapine (ZYPREXA) 7.5 MG tablet Take 1 tablet (7.5 mg) by mouth at bedtime  Qty: 30 tablet, Refills: 0    Associated Diagnoses: Acute encephalopathy      thiamine (B-1) 100 MG tablet Take 1 tablet (100 mg) by mouth daily  Qty: 30 tablet, Refills: 0    Associated Diagnoses: Acute encephalopathy       !! - Potential duplicate medications found. Please discuss with provider.        CONTINUE these medications which have NOT CHANGED    Details   atorvastatin (LIPITOR) 20 MG tablet Take 20 mg by mouth daily      fish oil-omega-3 fatty acids 1000 MG capsule Take 2 g by mouth daily      glucosamine-chondroitin 500-400 MG CAPS per capsule Take 1 capsule by mouth daily      levothyroxine (SYNTHROID/LEVOTHROID) 88 MCG tablet Take 88 mcg by mouth daily      multivitamin  with lutein (OCUVITE WITH LUTEIN) CAPS per capsule Take 1 capsule by mouth daily      TURMERIC PO Take 1 capsule by mouth daily           Allergies   Allergies   Allergen Reactions    Alendronate Other (See Comments)     PN:  SWALLOWING    Sulfa Antibiotics Hives     PN: HIVES

## 2024-06-20 NOTE — PLAN OF CARE
Iv discontinued. Given discharge medications. Discharge instructions and medications reviewed with pt and son and questions answered. Home with son to provide transportation. Son from [place] and planning to stay with patient until she can live independently. Home care nurse to visit tomorrow. Denies pain. Did start rambling and making paranoid comments, but easily redirected.  To follow up in a week with PCP, neuro and psych.      Problem: Adult Inpatient Plan of Care  Goal: Absence of Hospital-Acquired Illness or Injury  Intervention: Prevent Skin Injury  Recent Flowsheet Documentation  Taken 6/20/2024 1700 by Giselle Fonseca RN  Body Position: position changed independently     Problem: Fatigue  Goal: Improved Activity Tolerance  Intervention: Promote Improved Energy  Recent Flowsheet Documentation  Taken 6/20/2024 1700 by Giselle Fonseca RN  Activity Management:   activity adjusted per tolerance   up ad tee  Environmental Support:   calm environment promoted   caregiver consistency promoted   rest periods encouraged   distractions minimized     Problem: Adult Inpatient Plan of Care  Goal: Absence of Hospital-Acquired Illness or Injury  Intervention: Prevent Skin Injury  Recent Flowsheet Documentation  Taken 6/20/2024 1700 by Giselle Fonseca RN  Body Position: position changed independently     Problem: Fatigue  Goal: Improved Activity Tolerance  Intervention: Promote Improved Energy  Recent Flowsheet Documentation  Taken 6/20/2024 1700 by Giselle Fonseca RN  Activity Management:   activity adjusted per tolerance   up ad tee  Environmental Support:   calm environment promoted   caregiver consistency promoted   rest periods encouraged   distractions minimized   Goal Outcome Evaluation:

## 2024-06-22 ENCOUNTER — HOSPITAL ENCOUNTER (OUTPATIENT)
Facility: CLINIC | Age: 74
Setting detail: OBSERVATION
Discharge: PSYCHIATRIC OR SUBSTANCE USE FACILITY | End: 2024-06-25
Attending: EMERGENCY MEDICINE
Payer: MEDICARE

## 2024-06-22 ENCOUNTER — PATIENT OUTREACH (OUTPATIENT)
Dept: CARE COORDINATION | Facility: CLINIC | Age: 74
End: 2024-06-22
Payer: MEDICARE

## 2024-06-22 ENCOUNTER — NURSE TRIAGE (OUTPATIENT)
Dept: CARE COORDINATION | Facility: CLINIC | Age: 74
End: 2024-06-22
Payer: MEDICARE

## 2024-06-22 DIAGNOSIS — R46.89 DISORGANIZED BEHAVIOR: ICD-10-CM

## 2024-06-22 DIAGNOSIS — F22 PARANOIA (H): ICD-10-CM

## 2024-06-22 DIAGNOSIS — F29 PSYCHOSIS, UNSPECIFIED PSYCHOSIS TYPE (H): ICD-10-CM

## 2024-06-22 LAB
ALBUMIN UR-MCNC: NEGATIVE MG/DL
AMPHETAMINES UR QL SCN: NORMAL
APPEARANCE UR: CLEAR
BARBITURATES UR QL SCN: NORMAL
BASOPHILS # BLD AUTO: 0.1 10E3/UL (ref 0–0.2)
BASOPHILS NFR BLD AUTO: 1 %
BENZODIAZ UR QL SCN: NORMAL
BILIRUB UR QL STRIP: NEGATIVE
BZE UR QL SCN: NORMAL
CANNABINOIDS UR QL SCN: NORMAL
COLOR UR AUTO: NORMAL
EOSINOPHIL # BLD AUTO: 0.1 10E3/UL (ref 0–0.7)
EOSINOPHIL NFR BLD AUTO: 1 %
ERYTHROCYTE [DISTWIDTH] IN BLOOD BY AUTOMATED COUNT: 14.2 % (ref 10–15)
FENTANYL UR QL: NORMAL
GLUCOSE UR STRIP-MCNC: NEGATIVE MG/DL
HCT VFR BLD AUTO: 42 % (ref 35–47)
HGB BLD-MCNC: 14.1 G/DL (ref 11.7–15.7)
HGB UR QL STRIP: NEGATIVE
IMM GRANULOCYTES # BLD: 0 10E3/UL
IMM GRANULOCYTES NFR BLD: 0 %
KETONES UR STRIP-MCNC: NEGATIVE MG/DL
LEUKOCYTE ESTERASE UR QL STRIP: NEGATIVE
LYMPHOCYTES # BLD AUTO: 1.9 10E3/UL (ref 0.8–5.3)
LYMPHOCYTES NFR BLD AUTO: 22 %
MCH RBC QN AUTO: 29.9 PG (ref 26.5–33)
MCHC RBC AUTO-ENTMCNC: 33.6 G/DL (ref 31.5–36.5)
MCV RBC AUTO: 89 FL (ref 78–100)
MONOCYTES # BLD AUTO: 0.8 10E3/UL (ref 0–1.3)
MONOCYTES NFR BLD AUTO: 10 %
NEUTROPHILS # BLD AUTO: 5.5 10E3/UL (ref 1.6–8.3)
NEUTROPHILS NFR BLD AUTO: 65 %
NITRATE UR QL: NEGATIVE
NRBC # BLD AUTO: 0 10E3/UL
NRBC BLD AUTO-RTO: 0 /100
OPIATES UR QL SCN: NORMAL
PCP QUAL URINE (ROCHE): NORMAL
PH UR STRIP: 6 [PH] (ref 5–7)
PLATELET # BLD AUTO: 202 10E3/UL (ref 150–450)
RBC # BLD AUTO: 4.72 10E6/UL (ref 3.8–5.2)
RBC URINE: <1 /HPF
SP GR UR STRIP: 1 (ref 1–1.03)
SQUAMOUS EPITHELIAL: <1 /HPF
UROBILINOGEN UR STRIP-MCNC: NORMAL MG/DL
WBC # BLD AUTO: 8.3 10E3/UL (ref 4–11)
WBC URINE: <1 /HPF

## 2024-06-22 PROCEDURE — 36415 COLL VENOUS BLD VENIPUNCTURE: CPT | Performed by: EMERGENCY MEDICINE

## 2024-06-22 PROCEDURE — 80307 DRUG TEST PRSMV CHEM ANLYZR: CPT | Performed by: EMERGENCY MEDICINE

## 2024-06-22 PROCEDURE — 250N000013 HC RX MED GY IP 250 OP 250 PS 637: Performed by: EMERGENCY MEDICINE

## 2024-06-22 PROCEDURE — 81001 URINALYSIS AUTO W/SCOPE: CPT | Performed by: EMERGENCY MEDICINE

## 2024-06-22 PROCEDURE — 85025 COMPLETE CBC W/AUTO DIFF WBC: CPT | Performed by: EMERGENCY MEDICINE

## 2024-06-22 PROCEDURE — 99285 EMERGENCY DEPT VISIT HI MDM: CPT

## 2024-06-22 RX ORDER — IBUPROFEN 600 MG/1
600 TABLET, FILM COATED ORAL EVERY 6 HOURS PRN
Status: DISCONTINUED | OUTPATIENT
Start: 2024-06-22 | End: 2024-06-25 | Stop reason: HOSPADM

## 2024-06-22 RX ORDER — LEVOTHYROXINE SODIUM 88 UG/1
88 TABLET ORAL DAILY
Status: DISCONTINUED | OUTPATIENT
Start: 2024-06-23 | End: 2024-06-25 | Stop reason: HOSPADM

## 2024-06-22 RX ORDER — LANOLIN ALCOHOL/MO/W.PET/CERES
3 CREAM (GRAM) TOPICAL
Status: DISCONTINUED | OUTPATIENT
Start: 2024-06-22 | End: 2024-06-25 | Stop reason: HOSPADM

## 2024-06-22 RX ORDER — OLANZAPINE 7.5 MG/1
7.5 TABLET, FILM COATED ORAL AT BEDTIME
Status: DISCONTINUED | OUTPATIENT
Start: 2024-06-22 | End: 2024-06-25 | Stop reason: HOSPADM

## 2024-06-22 RX ORDER — HYDROXYZINE HYDROCHLORIDE 25 MG/1
25 TABLET, FILM COATED ORAL
Status: DISCONTINUED | OUTPATIENT
Start: 2024-06-22 | End: 2024-06-25 | Stop reason: HOSPADM

## 2024-06-22 RX ORDER — VIT C/E/ZN/COPPR/LUTEIN/ZEAXAN 60 MG-6 MG
1 CAPSULE ORAL DAILY
Status: DISCONTINUED | OUTPATIENT
Start: 2024-06-23 | End: 2024-06-25 | Stop reason: HOSPADM

## 2024-06-22 RX ORDER — OLANZAPINE 10 MG/2ML
5 INJECTION, POWDER, FOR SOLUTION INTRAMUSCULAR 2 TIMES DAILY PRN
Status: DISCONTINUED | OUTPATIENT
Start: 2024-06-22 | End: 2024-06-23

## 2024-06-22 RX ORDER — ATORVASTATIN CALCIUM 10 MG/1
20 TABLET, FILM COATED ORAL DAILY
Status: DISCONTINUED | OUTPATIENT
Start: 2024-06-23 | End: 2024-06-25 | Stop reason: HOSPADM

## 2024-06-22 RX ORDER — OLANZAPINE 2.5 MG/1
2.5 TABLET, FILM COATED ORAL 2 TIMES DAILY PRN
Status: DISCONTINUED | OUTPATIENT
Start: 2024-06-22 | End: 2024-06-25 | Stop reason: HOSPADM

## 2024-06-22 RX ORDER — ACETAMINOPHEN 325 MG/1
650 TABLET ORAL EVERY 4 HOURS PRN
Status: DISCONTINUED | OUTPATIENT
Start: 2024-06-22 | End: 2024-06-25 | Stop reason: HOSPADM

## 2024-06-22 RX ORDER — FOLIC ACID 1 MG/1
1 TABLET ORAL DAILY
Status: DISCONTINUED | OUTPATIENT
Start: 2024-06-23 | End: 2024-06-25 | Stop reason: HOSPADM

## 2024-06-22 RX ORDER — LORAZEPAM 1 MG/1
1 TABLET ORAL EVERY 8 HOURS PRN
Status: DISCONTINUED | OUTPATIENT
Start: 2024-06-22 | End: 2024-06-25 | Stop reason: HOSPADM

## 2024-06-22 RX ADMIN — OLANZAPINE 7.5 MG: 2.5 TABLET, FILM COATED ORAL at 22:11

## 2024-06-22 ASSESSMENT — ACTIVITIES OF DAILY LIVING (ADL)
ADLS_ACUITY_SCORE: 37

## 2024-06-22 NOTE — ED TRIAGE NOTES
[facility]  ED Arrival Note      Means of Arrival: EMS  Comes from: Home    Story:Pt brought in by EMS from home for altered mental status, hallucination, erratic behavior since returning from [place].Son is visiting out of town and called 911. Pt brought in evaluation as she has been leaving water taps running and believes someone is watching her from the TV    Pt does not want any men including her son or  to come to room. Pt lives independently at home.       EMS/PD Interventions: N/A  EMS Medications: Droperidol 5 mg IM for verbal aggression and refusing to leave the house.     Meets Stroke Criteria? No  Meets Trauma Criteria? No  Shock Index (HR/SBP): N/A      Directed to: MH/BH  Belongings: Remain with patient

## 2024-06-22 NOTE — PROGRESS NOTES
[facility] Center: [facility]    Background: Transitional Care Management program identified per system criteria and reviewed by [facility] team for possible outreach.    Assessment: Upon chart review, [facility] Team member will not proceed with patient outreach related to this episode of Transitional Care Management program due to reason below:    RN briefly spoke to sonAntonio, with verbal permission from Samantha.     Due to concerns of paranoia RN triaged patient's symptoms.  See triage encounter dated 6/22/24.  Protocol recommended calling 911 now.  Due to urgency of disposition TCM was not completed.      Plan: Transitional Care Management episode addressed appropriately per reason noted above.      Brisa Burkett, MC  [facility] Resource [place], [facility]    *Connected Care Resource Team does NOT follow patient ongoing. Referrals are identified based on internal discharge reports and the outreach is to ensure patient has an understanding of their discharge instructions.

## 2024-06-22 NOTE — TELEPHONE ENCOUNTER
Triage Call:    RN called to complete TCM post hospital discharge call with patient. Verbal permission obtained from patient to speak to son Antonio.  Per Antonio patient has been very paranoid lately.  She tossed a brand new container of meal replacement powder as she stated it had mice in it.  Son states it did not have mice.  She is also unplugging all electronic devices and throwing things around the house.     Pt was advised of protocol recommendation/disposition of call 911 now.  Son may call 911 or he will drive her to Emergency room as his concern is getting her to the appropriate ED.  RN discussed [facility] as they have the EMPATH unit on site.  Son was given 24/7 nurse line information and advised to call with concerns.       Brisa Burkett RN 06/22/24 3:05 PM  [facility] - Ambulatory Care Management  Reason for Disposition   Seeing, hearing, or feeling things that are not there (i.e., visual, auditory, or tactile hallucinations)    Additional Information   Negative: [1] Numbness of the face, arm, or leg on one side of the body AND [2] new-onset   Negative: [1] Loss of speech or garbled speech AND [2] new-onset   Negative: [1] Weakness of the face, arm, or leg on one side of the body AND [2] new-onset   Negative: Followed a head injury   Negative: Drug overdose suspected   Negative: [1] Difficult to awaken or acting confused (e.g., disoriented, slurred speech) AND [2] present now AND [3] new-onset   Negative: [1] Difficult to awaken or acting confused (e.g., disoriented, slurred speech) AND [2] present now AND [3] diabetes mellitus   Negative: Difficulty breathing or bluish lips   Negative: Shock suspected (e.g., cold/pale/clammy skin, too weak to stand, low BP, rapid pulse)    Protocols used: Confusion - Delirium-A-AH

## 2024-06-22 NOTE — ED NOTES
Bed: ED17  Expected date:   Expected time:   Means of arrival:   Comments:  [facility] 74F crisis eval

## 2024-06-22 NOTE — ED NOTES
Dinner tray provided. Pt's son briefly at bedside but she told him to leave. Son now speaking with attending physician.

## 2024-06-23 ENCOUNTER — TELEPHONE (OUTPATIENT)
Dept: BEHAVIORAL HEALTH | Facility: CLINIC | Age: 74
End: 2024-06-23
Payer: MEDICARE

## 2024-06-23 PROBLEM — F22 PARANOIA (H): Status: ACTIVE | Noted: 2024-06-23

## 2024-06-23 PROBLEM — F29 PSYCHOSIS, UNSPECIFIED PSYCHOSIS TYPE (H): Status: ACTIVE | Noted: 2024-06-23

## 2024-06-23 PROBLEM — R46.89 DISORGANIZED BEHAVIOR: Status: ACTIVE | Noted: 2024-06-23

## 2024-06-23 PROCEDURE — 250N000013 HC RX MED GY IP 250 OP 250 PS 637: Performed by: EMERGENCY MEDICINE

## 2024-06-23 PROCEDURE — 99223 1ST HOSP IP/OBS HIGH 75: CPT

## 2024-06-23 PROCEDURE — G0378 HOSPITAL OBSERVATION PER HR: HCPCS

## 2024-06-23 RX ADMIN — ACETAMINOPHEN 650 MG: 325 TABLET, FILM COATED ORAL at 04:22

## 2024-06-23 RX ADMIN — THIAMINE HCL TAB 100 MG 100 MG: 100 TAB at 10:33

## 2024-06-23 RX ADMIN — ATORVASTATIN CALCIUM 20 MG: 20 TABLET, FILM COATED ORAL at 10:49

## 2024-06-23 RX ADMIN — LEVOTHYROXINE SODIUM 88 MCG: 88 TABLET ORAL at 10:47

## 2024-06-23 RX ADMIN — Medication 1 CAPSULE: at 10:48

## 2024-06-23 RX ADMIN — FOLIC ACID 1 MG: 1 TABLET ORAL at 10:47

## 2024-06-23 RX ADMIN — OLANZAPINE 2.5 MG: 2.5 TABLET, FILM COATED ORAL at 10:49

## 2024-06-23 ASSESSMENT — ACTIVITIES OF DAILY LIVING (ADL)
ADLS_ACUITY_SCORE: 37

## 2024-06-23 NOTE — PLAN OF CARE
"Maribell Mendoza  June 23, 2024  Plan of Care Hand-off Note     Patient Care Path: inpatient mental health    Plan for Care:   Pt was initially brought to the ED on 6/13 due to symptoms of psychosis after a trip to [place] with her son and his family. Pt's son reported that pt had a rough 48 hours since discharging from her medical bed on 6/20. The plan was for her to have 2x four hour in home care after her discharge and he was hoping she would do better at home than in the hospital. When she came home, things quickly turned worse. She became agitated, was unable to function at home and created chaos at home. She is disorganized and tangential in her speech and struggles to stay on topic regarding current concerns. She gestures and tries to make connections, explaining \"it is all connected\" and \"there is a bridge\". She denies mood issues as well as SI. She denies AH and VH. She wants to discharge, but is agreeable to come to [facility] for further observation and treatment. It is the recommendation of this clinician that the patient be admitted to inpatient mental health care for further treatment, stabilization and safety. Attempts at managing mental health symptoms and maintaining safety at a lower level of care have been unsuccessful. Patient s current mental health symptoms are requiring a secure setting due to: pt is displaying symptoms of psychosis that are impairing ability to function safely in the community.    Identified Goals and Safety Issues: Stabilize and clear from psychosis.    Overview:  Tom Mendoza (Son)  [phone]            Legal Status: Legal Status at Admission: Voluntary/Patient has signed consent for treatment    Psychiatry Consult:       Updated MD and RN  regarding plan of care.          Rosa Szymanski"

## 2024-06-23 NOTE — ED NOTES
"Pt given a copy of Emergency Hospitalization Hold and rights document. Pt upset stating, \"I will die if I have to spend another night in the hospital. I don't like being cornered by a man or a woman.\" Pt given encouragement and redirection. She was updated on plan to go IP on the 72 hour hold. She was also reminded of scheduled HS medication. Pt stated, \"I will be happy to take the medication when you give it to Antonio or any of my legal proxies. Pt is tangential in speech. She then requested her dinner tray and has been observed eating dinner. Pt calm in mood while sitting on the recliner eating.   "

## 2024-06-23 NOTE — ED PROVIDER NOTES
Care signed out to me by Dr. Best.  Please see initial ED provider note with Dr. Wilson regarding HPI, exam and MDM.  Concern for ongoing paranoia and delusions with disorganized behavior.  Awaiting DEC assessment this morning.  Patient has had copious medical workup for and recent days he makes.  Medically cleared at this time.    DEC evaluated the patient, please see their separate consultation note.  They recommended transfer to [facility] for continued psychiatric evaluation and management.  Patient is voluntarily willing to go to [facility] at this time.  She is not interested in inpatient psychiatric admission at this point although following additional psychiatric evaluation [facility], providers may choose to place the patient on a 72-hour hold if she is no longer willing to stay voluntarily.  Patient transferred to [facility] for definitive psychiatric evaluation and management.      ICD-10-CM    1. Paranoia (H)  F22       2. Disorganized behavior  R46.89              Bladimir Negrete MD  06/23/24 5428

## 2024-06-23 NOTE — PROGRESS NOTES
Patient still waiting to be visited by son as per behavioral ED RN. Standing by to  patient once ready to be transferred.

## 2024-06-23 NOTE — ED NOTES
Assumed care of pt, pt observed sitting in bed, frequently getting up and wandering in room. Son went home for the night.

## 2024-06-23 NOTE — PROGRESS NOTES
[facility] Progress Note    Client Name: Maribell Mendoza  Date: June 23, 2024  Service Type:  Group Therapy  Facilitator: Santhosh Amato        Response:  Patient did not participate in group.      Santhosh Amato

## 2024-06-23 NOTE — ED NOTES
IP MH Referral Acuity Rating Score (RARS)    LMHP complete at referral to IP MH, with DEC; and, daily while awaiting IP MH placement. Call score to PPS.  CRITERIA SCORING   New 71 HH and Involuntary for IP MH (not adolescent) 1/1   Boarding over 24 hours 1/1   Vulnerable adult at least 55+ with multiple co morbidities; or, Patient age 11 or under 1/1   Suicide ideation without relief of precipitating factors 0/1   Current plan for suicide 0/1   Current plan for homicide 0/1   Imminent risk or actual attempt to seriously harm another without relief of factors precipitating the attempt 0/1   Severe dysfunction in daily living (ex: complete neglect for self care, extreme disruption in vegetative function, extreme deterioration in social interactions) 1/1   Recent (last 2 weeks) or current physical aggression in the ED 0/1   Restraints or seclusion episode in ED 0/1   Verbal aggression, agitation, yelling, etc., while in the ED 1/1   Active psychosis with psychomotor agitation or catatonia 1/1   Need for constant or near constant redirection (from leaving, from others, etc).  0/1   Intrusive or disruptive behaviors 0/1   TOTAL Acuity Total Score: 6      Addendum: Updated at 9381 6/23/24 per provider request
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
## 2024-06-23 NOTE — PROGRESS NOTES
Patient calm and pleasant in the unit. Made some calls, checked some coloring sheets. Updated that she will be seen soon by the provider.

## 2024-06-23 NOTE — PHARMACY-ADMISSION MEDICATION HISTORY
Pharmacist Admission Medication History    Admission medication history is complete. The information provided in this note is only as accurate as the sources available at the time of the update.    Information Source(s): Family member via phone    Pertinent Information: Patient hasn't been taking any supplements since discharge.     Changes made to PTA medication list:  Added: None  Deleted: None  Changed: None    Allergies reviewed with patient and updates made in EHR: no    Medication History Completed By: Argentina Nation RPH 6/22/2024 7:31 PM    PTA Med List   Medication Sig Last Dose    atorvastatin (LIPITOR) 20 MG tablet Take 20 mg by mouth daily     levothyroxine (SYNTHROID/LEVOTHROID) 88 MCG tablet Take 88 mcg by mouth daily     OLANZapine (ZYPREXA) 7.5 MG tablet Take 1 tablet (7.5 mg) by mouth at bedtime

## 2024-06-23 NOTE — ED NOTES
LM arrived for DEC assessment and pt was sleeping. After consulting with nursing, the decision was made to allow pt to keep sleeping for now. Nursing will notify when pt is up and ready for assessment.

## 2024-06-23 NOTE — ED NOTES
Pt frustrated with wait for assessment and not being able to go home tonight. Pt went to sit in common area for awhile and is now back in room, asked to watch TV.

## 2024-06-23 NOTE — ED NOTES
Pt expressed anger and frustration that she is still in the hospital and is requesting to be discharged to the care of her son. Pt states she was brought in by ambulance yesterday but declined to talked about the reason or events that brought her to the hospital. She was observed sitting on chair in the lounge. Pt was given coloring sheets per her request. Pt waiting to meet with provider for reassessment. Current plan is for patient to go IP voluntary.
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
## 2024-06-23 NOTE — ED PROVIDER NOTES
"EmPATH Unit - Initial Psychiatric Observation Note  [facility] Emergency Department  Observation Initiation Date: Jun 22, 2024    Maribell Mendoza MRN: [id number]   Age: 74 year old YOB: 1950     History     Chief Complaint   Patient presents with    Psychiatric Evaluation     Altered mental status     HPI  Maribell Mendoza is a 74 year old female with a past history notable for paranoia and psychosis who presented to the ED on 6/22/24 with worsening symptoms of psychosis including disorganization, paranoia, verbal aggression and erratic behavior. In the emergency department, this patient was determined to be medically stable and transferred to the EmPATH unit for psychiatric assessment. Patient was recently hospitalized at [facility] following a similar presentation, I am familiar with this patient as she was seen by the psychiatry consult service during her stay at [facility]. In summary, patient's current symptoms appeared 2 weeks ago following return from a family trip to [place]. Extensive medical and neurology work-up have been largely unrevealing. At this time, patient was started on olanzapine and mild improvements were notes. Upon initial assessment previous IP mental health admissions were denied however son now reports one IP mental health admission  between 1967 and 1971, additional information surrounding this is unknown. They have currently been in the emergency department for 24 hours.     Samantha was sitting in the milieu when I went to meet with her. She was agreeable to accompany me to a consult room. She tells me \"everything is connected\" and explains that there's a \"clash with my son and my self.\" She tells me she is \"weaning off of supplements and medications.\" She says she was denied her right to go to the [employer] this weekend. When asked about any recent alcohol use, she tells me she drinks a shot of wine at the [employer] as part of the service and then explains how she " "\"doesn't want impairments hindering me.\" She tells me she doesn't want to take statins as she can't have grapefruit juice. She explains that her house is infested with mice and burglars but that her son doesn't see it. She then starts talking about her son's college education, vacations, and her despite of airport layovers when there's bad weather.  She is quite tangential in her speech. In summary, it appears as though she disagrees with her son's request for her to continue taking medications that she was recently discharged with, this has lead to worsening conflict and continued decompensation. She does not trust her son or others and tells me she needs to be driven home by a first cousin. I discussed that I did not feel comfortable with her discharging today given her recent hospital stay, minimal improvement in symptoms, and report from family. Discussed mild improvements with past olanzapine and consideration for transition to Seroquel or risperidone vs resumption of olanzapine. She tells me she will not try new medications and asked me to remove myself from the consult room.     Collateral from son reports that patient quickly decompensated upon discharge from the hospital on 6/20/24. Patient has been refusing medications and increasingly paranoid. Son did find out additional information that patient does appear to have had one prior mental health hospitalization however details of this are unknown. Per son, she has torn apart her house and believes mice and burglars have invaded. She has not been eating or drinking fluids unless prompted.     Past Medical History  No past medical history on file.  No past surgical history on file.  atorvastatin (LIPITOR) 20 MG tablet  levothyroxine (SYNTHROID/LEVOTHROID) 88 MCG tablet  OLANZapine (ZYPREXA) 7.5 MG tablet  fish oil-omega-3 fatty acids 1000 MG capsule  folic acid (FOLVITE) 1 MG tablet  glucosamine-chondroitin 500-400 MG CAPS per capsule  multivitamin  with " "lutein (OCUVITE WITH LUTEIN) CAPS per capsule  OLANZapine (ZYPREXA) 2.5 MG tablet  thiamine (B-1) 100 MG tablet  TURMERIC PO      Allergies   Allergen Reactions    Alendronate Other (See Comments)     PN:  SWALLOWING    Sulfa Antibiotics Hives     PN: HIVES     Family History  No family history on file.  Social History   Social History     Tobacco Use    Smoking status: Never    Smokeless tobacco: Never   Vaping Use    Vaping status: Never Used          Review of Systems  A medically appropriate review of systems was performed with pertinent positives and negatives noted in the HPI, and all other systems negative.    Physical Examination   BP: (!) 163/71  Pulse: 82  Temp: 97.1  F (36.2  C)  Resp: 16  Height: 160 cm (5' 3\")  Weight: 75.3 kg (166 lb)  SpO2: 95 %    Physical Exam  General: Appears stated age.   Neuro: Alert and fully oriented. Extremities appear to demonstrate normal strength on visual inspection.   Integumentary/Skin: no rash visualized, normal color    Psychiatric Examination   Appearance: awake, alert, adequately groomed, appeared as age stated, and casually dressed  Attitude:  somewhat cooperative  Eye Contact:  fair  Mood:  anxious  Affect:  mood congruent and intensity is dramatic  Speech:  clear, coherent and pressured speech  Psychomotor Behavior:  no evidence of tardive dyskinesia, dystonia, or tics and intact station, gait and muscle tone  Thought Process:  disorganized  Associations:  no loose associations  Thought Content:  no evidence of suicidal ideation or homicidal ideation, no auditory hallucinations present, no visual hallucinations present, and paranoia regarding her son and others trying to break into her house   Insight: poor   Judgement:  poor  Oriented to:  time, person, and place  Attention Span and Concentration:  poor  Recent and Remote Memory:  poor  Language: able to name/identify objects without impairment  Fund of Knowledge: intact with awareness of current and past " events    ED Course     ED Course as of 06/23/24 1728   Sat Jun 22, 2024   1738 I obtained history and examined the patient as noted above.         Labs Ordered and Resulted from Time of ED Arrival to Time of ED Departure   ROUTINE UA WITH MICROSCOPIC REFLEX TO CULTURE - Normal       Result Value    Color Urine Straw      Appearance Urine Clear      Glucose Urine Negative      Bilirubin Urine Negative      Ketones Urine Negative      Specific Gravity Urine 1.003      Blood Urine Negative      pH Urine 6.0      Protein Albumin Urine Negative      Urobilinogen Urine Normal      Nitrite Urine Negative      Leukocyte Esterase Urine Negative      RBC Urine <1      WBC Urine <1      Squamous Epithelials Urine <1     URINE DRUG SCREEN PANEL - Normal    Amphetamines Urine Screen Negative      Barbituates Urine Screen Negative      Benzodiazepine Urine Screen Negative      Cannabinoids Urine Screen Negative      Cocaine Urine Screen Negative      Fentanyl Qual Urine Screen Negative      Opiates Urine Screen Negative      PCP Urine Screen Negative     CBC WITH PLATELETS AND DIFFERENTIAL    WBC Count 8.3      RBC Count 4.72      Hemoglobin 14.1      Hematocrit 42.0      MCV 89      MCH 29.9      MCHC 33.6      RDW 14.2      Platelet Count 202      % Neutrophils 65      % Lymphocytes 22      % Monocytes 10      % Eosinophils 1      % Basophils 1      % Immature Granulocytes 0      NRBCs per 100 WBC 0      Absolute Neutrophils 5.5      Absolute Lymphocytes 1.9      Absolute Monocytes 0.8      Absolute Eosinophils 0.1      Absolute Basophils 0.1      Absolute Immature Granulocytes 0.0      Absolute NRBCs 0.0         Assessments & Plan (with Medical Decision Making)   Patient presenting with worsening psychosis that started 1-2 weeks prior. Patient has been increasingly paranoid, tangential and erratic. Additionally, patient has been refusing medications since returning home from previous hospitalization on 6/20/2024. Extensive  medical and neurology work-up performed during recent hospitalization, largely unremarkable.  It is noted that patient did have mental health hospitalization 40 years prior however additional details are unknown and previous dx is unknown. Treatment plan focused on medications further targeting psychosis and arranging transfer to inpatient mental health bed for additional stabilization. . Nursing notes reviewed noting no acute issues.     I have reviewed the assessment completed by the [facility].     During the observation period, the patient did not require medications for agitation, and did not require restraints/seclusion for patient and/or provider safety.     The patient was found to have a psychiatric condition that would benefit from an observation stay in the emergency department for further psychiatric stabilization and/or coordination of a safe disposition. The observation plan includes serial assessments of psychiatric condition, potential administration of medications if indicated, further disposition pending the patient's psychiatric course during the monitoring period.     Preliminary diagnosis:    ICD-10-CM    1. Paranoia (H)  F22       2. Disorganized behavior  R46.89       3. Psychosis, unspecified psychosis type (H)  F29            Treatment Plan:  -Resume olanzapine 7.5mg at bedtime and additional 2.5mg twice daily as needed for psychosis  -Consider transition to Seroquel or risperidone, patient currently declines this  -EmPATH standing order medications available   -Continue all other PTA medications  -Transfer to inpatient mental health bed for further observation and stabilization.   -72 hour hold initiated given decompensated state; continue to reassess to determine if patient improves and hold can be discontinued     --  LYNDA Mckeon CNP   [facility] EMERGENCY DEPT  EmPATH Unit       Maricel Napier APRN CNP  06/23/24 [id number]

## 2024-06-23 NOTE — TELEPHONE ENCOUNTER
S: DELICIA Madrid  Rosa  calling at 11:46 AM  about a 74 year old/Female presenting with  an apparent psychotic episode.     B: Pt arrived via EMS. Presenting problem, stressors:  Pt lives independently.   Pt's difficulties started around June 12 th and she was hospitalized for about 1 week having a full medical work up.  No physical proble was found.  She was D.C.'d home and sx returned.  She came back to the ED.   Pt has not been eating , drinking or taking her meds unless directed by her son.  She is not functioning.   reports there was no mention of any dementia.  She is quite disorganized and tangential.  She has been agitated at home but is calm and cooperative in the ED.      Pt affect in ED:   Pt Dx: Unspecified Psychosis  Previous IPMH hx? Yes:   around 1970    Pt denies SI   Hx of suicide attempt?   Unknown  Pt denies SIB  Pt denies HI   Pt denies hallucinations .   Pt RARS Score:   2    Hx of aggression/violence, sexual offenses, legal concerns, Epic care plan? describe:  None  Current concerns for aggression this visit? No  Does pt have a history of Civil Commitment? No  Is Pt their own guardian? Yes    Pt is prescribed medication. Is patient medication compliant?  Only taking meds when son gives them to her.  Pt denies OP services   CD concerns: None  Acute or chronic medical concerns:   None reported  Does Pt present with specific needs, assistive devices, or exclusionary criteria? None      Pt is ambulatory  Pt is able to perform ADLs independently      A: Pt to be reviewed for [facility] admission. Pt is Voluntary  Preferred placement: [facility]  + 1    COVID Symptoms: No  If yes, COVID test required   Utox: Negative   CMP: N/A  CBC: WNL  HCG: N/A    R: Patient cleared and ready for behavioral bed placement: Yes  Pt placed on [facility] worklist? Yes    Does Patient need a Transfer Center request created? Yes, writer completed Transfer Center request at:

## 2024-06-23 NOTE — TELEPHONE ENCOUNTER
R: [place]  Access Inpatient Bed Call Log 6/23/24 4:30PM  Intake has called facilities that have not updated the bed status within the last 12 hours.             Pt wants [facility]+1 only.                         [facility] is at capacity            [facility] is posting 3 beds. [phone] Per call at 7:47am to [facility] at capacity.   [facility] is posting 2 beds. Negative covid required    [facility] is posting 0 beds. Neg covid. No [facility]. [phone] Per call at 7:54am to Kelli nyla can review for low acuity only.   [facility] is posting 0 beds. [phone]   [facility] is posting 0 beds. [phone]    [facility] is posting 2 beds. Negative covid. [phone] Per call at 7:54am to Shayleerubio mauro YA, however, child and adol beds are available.   [facility] is posting 0 beds [phone]     [facility] is posting 6 beds. LOW acuity ONLY. Mixed unit 12+. Negative covid- [phone]       Pt remains on the work list pending appropriate bed availability.

## 2024-06-24 ENCOUNTER — TELEPHONE (OUTPATIENT)
Dept: BEHAVIORAL HEALTH | Facility: CLINIC | Age: 74
End: 2024-06-24
Payer: MEDICARE

## 2024-06-24 PROCEDURE — G0378 HOSPITAL OBSERVATION PER HR: HCPCS

## 2024-06-24 PROCEDURE — 250N000013 HC RX MED GY IP 250 OP 250 PS 637: Performed by: EMERGENCY MEDICINE

## 2024-06-24 RX ADMIN — Medication 1 CAPSULE: at 08:02

## 2024-06-24 RX ADMIN — THIAMINE HCL TAB 100 MG 100 MG: 100 TAB at 08:00

## 2024-06-24 RX ADMIN — FOLIC ACID 1 MG: 1 TABLET ORAL at 08:00

## 2024-06-24 RX ADMIN — ATORVASTATIN CALCIUM 20 MG: 20 TABLET, FILM COATED ORAL at 08:01

## 2024-06-24 RX ADMIN — LEVOTHYROXINE SODIUM 88 MCG: 88 TABLET ORAL at 08:00

## 2024-06-24 ASSESSMENT — ACTIVITIES OF DAILY LIVING (ADL)
ADLS_ACUITY_SCORE: 37

## 2024-06-24 NOTE — H&P
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 disorder, the most likely diagnosis is decompensation of bipolar I disorder. She will likely benefit from pharmacologic stabilization and establishment of  longitudinal psychiatric care this admission.    Given that she currently has psychosis, patient warrants inpatient psychiatric hospitalization to maintain her safety.      Psychiatric Plan by Diagnosis      # primary psychosis vs encephalopathy unspecified   1. Medications:  - Continue PTA Olanzapine (7.5mg)  - PTA thiamine      2. Pertinent Labs/Monitoring:   - CBC WNL on 06/22  - Urinalysis WNL on 06/22.  - BMP WNL on 06/20  - MRI Brain with no acute abnormalities on 06/18  - Paraneoplastic panel pending  - Chest XR 6/19 showed no acute abnormalities   - EKG last on 6/13 showed  NSR  - B12 1,707 6/20 and folate elevated at >40.0 6/20  - Ammonia on 6/13 was wnl     3. Additional Plans:  - Patient will be treated in therapeutic milieu with appropriate individual and group therapies as described       Psychiatric Hospital Course:      Maribell Mendoza was admitted to [facility] as a voluntary patient  Medications:  PTA Olanzapine (7.5mg) was continued as well as pt's PTA nonpsychiatric medications   No PTA meds were held.   New medications started at the time of admission include none.     The risks, benefits, alternatives, and side effects were  discussed and understood by the patient and other caregivers.     Medical Assessment and Plan     Medical diagnoses to be addressed this admission:    #Hyperlipidemia  - Continue atorvastatin 20mg    #Osteoporosis  - Continue Glucosamine-chondroitin 400-400mg    #Hyperthoidism with Hashimoto's Thyroiditis  - Continue Levothroxine 88mcg    # Medical work up for psychosis  Medical admission on 06/13-06/20. Originally presented mild CMP changes: with elevated BUN 27.3, Cr 1.01, and anion gap of 17. Total michel slightly elevated at 1.3 as well as lipase of 63.  UA showed some ketones. BMP showed intermittent improvement and worsening over that hospital stay.   - Per neuro consult, recommended autoimmune work up, received a Paraneoplastic panel which is still in process and Brain MRI which showed no acute intercranial abnormality     Medical course: Patient was physically examined by the ED prior to being transferred to the unit and was found to be medically stable and appropriate for admission.     Consults:  none     Checklist     Legal Status: Orders Placed This Encounter      Emergency Hospitalization Hold (72 Hr Hold)      Safety Assessment:   Behavioral Orders   Procedures    Code 1 - Restrict to Unit    Routine Programming     As clinically indicated    Status 15     Every 15 minutes.       Risk Assessment:  Risk for harm is moderate-high.  Risk factors: family dynamics and impulsive  Protective factors: family     SIO: Not indicated at this time    Dispo: TBD. Disposition pending clinical stabilization, medication optimization and development of an appropriate discharge plan.     Attestations:     Antonio Lees, MS3  [facility] Medical Student     I was present with the medical student who participated in the service and in the documentation of the note.  I have verified the history and personally performed the physical exam and medical decision making. I agree with the assessment and plan of care as documented in the  note.    This patient was seen and discussed with my attending physician, Kizzy Hill MD.    Ramez Dagher, MD  Psychiatry Resident Physician     Attestation:  I, Kizzy Hill MD, have personally performed an examination of this patient and I have reviewed the resident's documentation.  I have edited the note to reflect all relevant changes.  I have discussed this patient with the house staff on 6/25/2024.  I agree with resident findings and plan in today's resident H&P.  I have reviewed all vitals and laboratory findings.      I certifiy that the inpatient services were ordered in accordance with the Medicare regulations governing the order. This includes certification that hospital inpatient services are reasonable and necessary and in the case of services not specified as inpatient-only under 42 .22(n), that they are appropriately provided as inpatient services in accordance with the 2-midnight benchmark under 42 .3(e).     The reason for inpatient status is Bipolar Disorder.    Kizzy Hill M.D.,Ph.D.

## 2024-06-24 NOTE — ED NOTES
Pt has been resting on the recliner watching tv. She was up to request new scrub pants, wipes and underwear. Pt reports she finally had a BM this evening. Pt has been calm, pleasant and cooperative.

## 2024-06-24 NOTE — PROGRESS NOTES
Calm and pleasant, compliant with medications, still tangential. Paranoia and psychosis not observed.

## 2024-06-24 NOTE — TELEPHONE ENCOUNTER
[facility] Access Inpatient Bed Call Log 6/24/2024 7:58:51 AM    Intake has called facilities that have not updated their bed status within the last 12 hours.    ADULTS:  *[facility] is posting 0 beds.              [facility] is posting 3 beds. [phone] 8:06A PER MANISH, BEDS AVAILABLE. CONTACT APS: [phone].  [facility] is posting 0 beds. [phone]              [facility] is posting 0 beds. [phone] 8:08A PER [facility], NO HIGH ACUITY, POSSIBLE LOW ACUITY BEDS.  [facility] is posting 0 beds. [phone]   [facility] (Ages 18-35) is posting 0 beds. Negative COVID test required, no recent or significant aggression, violence, or sexual assault. [phone] 8:02A PER VICKIE, 5 CHILD, HANDFUL OF ADOL BUT NO ROMARIO BEDS, AND NO YA BEDS.  [facility] is posting 0 beds. [phone]            [facility] is posting 0 beds. [phone]     [facility] is posting 0 beds. [phone]         *[facility] is posting 5 beds. Mixed unit 12+. Low acuity only.  DIRECT: [phone]      [facility] is positing 0 beds. No aggression.  [phone]         [facility] is posting 0 beds. [phone]   [facility] is posting 1 beds. Low acuity only. [phone] 8:09A PER [facility] UNKNOWN UNTIL 10:30A, [facility] AND [place] HAVE AVAILABILITY.     [facility] is posting 1 beds. Low acuity. No current aggression. [phone]   [facility] is posting 0 beds. Low acuity. [phone] 8:09A PER [facility] UNKNOWN UNTIL 10:30A, [facility] AND [place] HAVE AVAILABILITY.     [facility] is posting 1 beds. 72 HH preferred. Low acuity. [phone] 8:12A LEFT  REQUESTING CB.  [facility] is posting 3 beds. Low acuity. [phone] 8:09A PER SHALONDA WILSON UNKNOWN UNTIL 10:30A, [facility] AND [facility] HAVE AVAILABILITY.       [facility] is posting 5 beds. Vol only, No Hx  of aggression, violence, or assault. No sexual offenders. No 72 hr holds.      [facility] is posting 2 beds. (Must have the cognitive ability to do programming. No aggressive or violent behavior or recent HX in the last 2 yrs. MH must be primary.) [phone]  [facility] is posting 0 beds. Low acuity only. Violence and aggression capped. [phone]     [facility] is posting 2 beds. Low acuity, Neg Covid. [phone] 8:13A PER ADRIA, WAITING LIST.    [facility] [place] posting 0 beds. Negative covid.      [facility] is posting 2 beds. [phone] no wounds, lines, drains, C-paps, tubes and must be able to care for themselves; no heavy hx of aggression. Pt also needs a confirmed ride from facility upon d/c. 8:15A 2 BEDS AVAILABLE.  [facility] is posting 12 beds. Call for details. [phone] OUT OF STATE 8:00A PER OXANA, 13 KIDS BEDS, AND 10 ADULT BEDS.  [facility] is posting 5 beds. Mixed unit.  [phone] 8:05A BEDS AVAILABLE ON GEN UNIT. NO HIGH ACUITY.     Pt remains on work list pending appropriate bed availability.       12:16 PM Paged Sergio to review for admission to [facility].   1:42 PM Paged Sergio   2:05 PM Called Sergio, he will call back.   2:10 PM Dr. Hill called and accepts pt for admission to [facility].  2:17 PM Called [facility], CRN not available.  2:40 PM Called [facility], CRN on break. Left message about pt in queue, requested a call back if there are questions.   2:41 PM Called [facility] to provide placement.  4:05 PM Called [facility], d/t situation on unit, CRN unavailable.   4:55 PM Called [facility], per CHUCHO Mike RN can call for report anytime.   4:58 PM Updated [facility].

## 2024-06-24 NOTE — TELEPHONE ENCOUNTER
R: [place]  Access Inpatient Bed Call Log  6/24/24 @ 1:00am   Intake has called facilities that have not updated their bed status within the last 12 hours.     *[place]:  [place] [facility]: @ capacity.  [facility]: POSTING 3 BEDS. 1 ICU bed available. Reporting no reviews overnight.  [place] -- [facility]: @ cap per website. Low acuity  [place] [facility]: @ cap per website. Low acuity only.  [place] -- [facility]: @ cap per website.  [facility]: @ cap per website.  [facility] beds: @ cap per website. Ages 18-35, Voluntary only, NO aggression/physical/sexual assault, violence hx or drug abuse, or psychosis. Negative Covid.   [place] [facility] -- [facility]: @ cap per website.  [place] -- [facility]: @ cap per website.  [place] [facility]: @ cap per website. Do not review overnight.     Pt remains on waitlist pending appropriate placement availability

## 2024-06-24 NOTE — PROGRESS NOTES
Called [facility] to provide report. Staff not yet ready to get report and will just call back.     Update: provided report to [facility]

## 2024-06-24 NOTE — ED NOTES
Offered HS scheduled medications. Pt refused. Pt also refused toothbrush, toothpaste and mouthwash. Pt requested and received a blanket.

## 2024-06-24 NOTE — CONSULTS
Psychiatry consult no longer needed as pt is admitted to [facility]. Consult closed.

## 2024-06-24 NOTE — PROGRESS NOTES
Triage & Transition Services, Extended Care     Client Name: Maribell Mendoza    Date: June 24, 2024      Service Type: did not attend group session as she was speaking with RN  Session Start Time:       Session End Time:    Session Length:    Site Location: [facility] EMERGENCY DEPT                             EMP11  Total Number ofAttendees:   0

## 2024-06-25 ENCOUNTER — HOSPITAL ENCOUNTER (INPATIENT)
Facility: CLINIC | Age: 74
LOS: 10 days | Discharge: HOME | DRG: 885 | End: 2024-07-05
Attending: PSYCHIATRY & NEUROLOGY | Admitting: PSYCHIATRY & NEUROLOGY
Payer: MEDICARE

## 2024-06-25 ENCOUNTER — MEDICAL CORRESPONDENCE (OUTPATIENT)
Dept: HEALTH INFORMATION MANAGEMENT | Facility: CLINIC | Age: 74
End: 2024-06-25
Payer: MEDICARE

## 2024-06-25 VITALS
BODY MASS INDEX: 29.41 KG/M2 | HEIGHT: 63 IN | DIASTOLIC BLOOD PRESSURE: 80 MMHG | RESPIRATION RATE: 18 BRPM | TEMPERATURE: 98 F | OXYGEN SATURATION: 99 % | HEART RATE: 73 BPM | SYSTOLIC BLOOD PRESSURE: 166 MMHG | WEIGHT: 166 LBS

## 2024-06-25 DIAGNOSIS — I16.0 ASYMPTOMATIC HYPERTENSIVE URGENCY: Primary | ICD-10-CM

## 2024-06-25 DIAGNOSIS — F31.2 BIPOLAR AFFECTIVE DISORDER, CURRENTLY MANIC, SEVERE, WITH PSYCHOTIC FEATURES (H): ICD-10-CM

## 2024-06-25 PROBLEM — F23 PSYCHOTIC EPISODE (H): Status: ACTIVE | Noted: 2024-06-25

## 2024-06-25 LAB
AMPHIPHYSIN IGG SER QL IA: NEGATIVE
ANNOTATION COMMENT IMP: NORMAL
CV2 AB SERPL QL IF: NEGATIVE
GLIAL NUC TYPE 1 AB SER QL IF: NEGATIVE
HU1 AB SER QL: NEGATIVE
HU2 AB SER QL IF: NEGATIVE
HU3 AB SER QL: NEGATIVE
PARANEOPLASTIC AB SER-IMP: NORMAL
PCA-1 AB SER QL IF: NEGATIVE
PCA-2 AB SER QL IF: NEGATIVE
PCA-TR AB SER QL IF: NEGATIVE
VGCC-P/Q BIND IGG+IGM SER IA-SCNC: 0 NMOL/L
VGKC IGG+IGM SER IA-SCNC: 0 NMOL/L

## 2024-06-25 PROCEDURE — 250N000013 HC RX MED GY IP 250 OP 250 PS 637: Performed by: EMERGENCY MEDICINE

## 2024-06-25 PROCEDURE — 250N000013 HC RX MED GY IP 250 OP 250 PS 637

## 2024-06-25 PROCEDURE — 99223 1ST HOSP IP/OBS HIGH 75: CPT | Mod: GC | Performed by: PSYCHIATRY & NEUROLOGY

## 2024-06-25 PROCEDURE — 124N000002 HC R&B MH UMMC

## 2024-06-25 RX ORDER — LEVOTHYROXINE SODIUM 88 UG/1
88 TABLET ORAL DAILY
Status: DISCONTINUED | OUTPATIENT
Start: 2024-06-25 | End: 2024-07-05 | Stop reason: HOSPADM

## 2024-06-25 RX ORDER — SODIUM PHOSPHATE,MONO-DIBASIC 19G-7G/118
1 ENEMA (ML) RECTAL DAILY
Status: DISCONTINUED | OUTPATIENT
Start: 2024-06-25 | End: 2024-06-26

## 2024-06-25 RX ORDER — VIT C/E/ZN/COPPR/LUTEIN/ZEAXAN 60 MG-6 MG
1 CAPSULE ORAL DAILY
Status: DISCONTINUED | OUTPATIENT
Start: 2024-06-25 | End: 2024-07-05 | Stop reason: HOSPADM

## 2024-06-25 RX ORDER — LANOLIN ALCOHOL/MO/W.PET/CERES
3 CREAM (GRAM) TOPICAL
Status: DISCONTINUED | OUTPATIENT
Start: 2024-06-25 | End: 2024-07-05 | Stop reason: HOSPADM

## 2024-06-25 RX ORDER — CHLORAL HYDRATE 500 MG
2 CAPSULE ORAL DAILY
Status: DISCONTINUED | OUTPATIENT
Start: 2024-06-25 | End: 2024-06-26

## 2024-06-25 RX ORDER — ATORVASTATIN CALCIUM 20 MG/1
20 TABLET, FILM COATED ORAL DAILY
Status: DISCONTINUED | OUTPATIENT
Start: 2024-06-25 | End: 2024-07-05 | Stop reason: HOSPADM

## 2024-06-25 RX ORDER — POLYETHYLENE GLYCOL 3350 17 G/17G
17 POWDER, FOR SOLUTION ORAL DAILY PRN
Status: DISCONTINUED | OUTPATIENT
Start: 2024-06-25 | End: 2024-07-05 | Stop reason: HOSPADM

## 2024-06-25 RX ORDER — FOLIC ACID 1 MG/1
1 TABLET ORAL DAILY
Status: DISCONTINUED | OUTPATIENT
Start: 2024-06-25 | End: 2024-07-05 | Stop reason: HOSPADM

## 2024-06-25 RX ORDER — ACETAMINOPHEN 325 MG/1
650 TABLET ORAL EVERY 4 HOURS PRN
Status: DISCONTINUED | OUTPATIENT
Start: 2024-06-25 | End: 2024-07-05 | Stop reason: HOSPADM

## 2024-06-25 RX ORDER — OLANZAPINE 10 MG/2ML
2.5 INJECTION, POWDER, FOR SOLUTION INTRAMUSCULAR 3 TIMES DAILY PRN
Status: DISCONTINUED | OUTPATIENT
Start: 2024-06-25 | End: 2024-07-05 | Stop reason: HOSPADM

## 2024-06-25 RX ORDER — MAGNESIUM HYDROXIDE/ALUMINUM HYDROXICE/SIMETHICONE 120; 1200; 1200 MG/30ML; MG/30ML; MG/30ML
30 SUSPENSION ORAL EVERY 4 HOURS PRN
Status: DISCONTINUED | OUTPATIENT
Start: 2024-06-25 | End: 2024-07-05 | Stop reason: HOSPADM

## 2024-06-25 RX ORDER — OLANZAPINE 2.5 MG/1
2.5 TABLET, FILM COATED ORAL 3 TIMES DAILY PRN
Status: DISCONTINUED | OUTPATIENT
Start: 2024-06-25 | End: 2024-07-05 | Stop reason: HOSPADM

## 2024-06-25 RX ADMIN — LEVOTHYROXINE SODIUM 88 MCG: 88 TABLET ORAL at 08:43

## 2024-06-25 RX ADMIN — ATORVASTATIN CALCIUM 20 MG: 20 TABLET, FILM COATED ORAL at 08:42

## 2024-06-25 RX ADMIN — OLANZAPINE 7.5 MG: 2.5 TABLET, FILM COATED ORAL at 00:31

## 2024-06-25 RX ADMIN — Medication 1 CAPSULE: at 08:42

## 2024-06-25 RX ADMIN — THIAMINE HCL TAB 100 MG 100 MG: 100 TAB at 08:43

## 2024-06-25 RX ADMIN — FOLIC ACID 1 MG: 1 TABLET ORAL at 08:42

## 2024-06-25 ASSESSMENT — ACTIVITIES OF DAILY LIVING (ADL)
DRESS: INDEPENDENT
LAUNDRY: WITH SUPERVISION
ADLS_ACUITY_SCORE: 47
ADLS_ACUITY_SCORE: 57
ADLS_ACUITY_SCORE: 48
ADLS_ACUITY_SCORE: 48
ADLS_ACUITY_SCORE: 47
ADLS_ACUITY_SCORE: 47
ORAL_HYGIENE: INDEPENDENT
ADLS_ACUITY_SCORE: 47
ADLS_ACUITY_SCORE: 48
ADLS_ACUITY_SCORE: 47
ADLS_ACUITY_SCORE: 48
ADLS_ACUITY_SCORE: 47
HYGIENE/GROOMING: INDEPENDENT

## 2024-06-25 NOTE — PLAN OF CARE
Situation  Patient is a 74 year old female arriving to [facility] from [facility] from [facility] for Anxiety, Psychotic Disorder NOS, and Disorganized Thinking/Behaviors . . -Drug screen showed expected results.. COVID results not done.      Background  B: Pt arrived via EMS. Presenting problem, stressors:  Pt lives independently.   Pt's difficulties started around June 12 th and she was hospitalized for about 1 week having a full medical work up.  No physical proble was found.  She was D.C.'d home and sx returned.  She came back to the ED.   Pt has not been eating , drinking or taking her meds unless directed by her son.  She is not functioning.   reports there was no mention of any dementia.  She is quite disorganized and tangential.  She has been agitated at home but is calm and cooperative in the ED.       Pt affect in ED:   Pt Dx: Unspecified Psychosis  Previous IPMH hx? Yes:   around 1970    Pt denies SI   Hx of suicide attempt?   Unknown  Pt denies SIB  Pt denies HI   Pt denies hallucinations .   Pt RARS Score:   2     Hx of aggression/violence, sexual offenses, legal concerns, Epic care plan? describe:  None  Current concerns for aggression this visit? No  Does pt have a history of Civil Commitment? No  Is Pt their own guardian? Yes     Pt is prescribed medication. Is patient medication compliant?  Only taking meds when son gives them to her.  Pt denies OP services   CD concerns: None  Acute or chronic medical concerns:   None reported  Does Pt present with specific needs, assistive devices, or exclusionary criteria? None        Pt is ambulatory  Pt is able to perform ADLs independently       Legal Status  Pt is on a 72 hour hold which was placed on 06/23/2024 @ 1732  A Release of Information is not needed at this time     Assessment  Patient is awake and alert and oriented to time, place and person and orientated to Person, Place, Time, and Situation Alert upon admission assessment. Rates depression 0/10 and  anxiety 10/10 but declined to take any medications for anxiety. Denies SI and denies HI; does  agree to contract for safety at this time.   Report alcohol usage but occasionally and denies tobacco usage. Patient reports alcohol usage in measurement of never used. Patient does not use Tobacco products.. None implemented for tobacco use. Fall precautions implemented.   Writer explained patient bill of rights; The patient had no questions or concerns.. Writer orientated patient to the unit and explained unit rules; The patient had no questions or concerns.. Will continue to monitor and provide interventions as necessary.      Medications  Current Facility-Administered Medications   Medication Dose Route Frequency Provider Last Rate Last Admin    acetaminophen (TYLENOL) tablet 650 mg  650 mg Oral Q4H PRN Dagher, Ramez, MD        alum & mag hydroxide-simethicone (MAALOX) suspension 30 mL  30 mL Oral Q4H PRN Dagher, Ramez, MD        atorvastatin (LIPITOR) tablet 20 mg  20 mg Oral Daily Dagher, Ramez, MD        fish oil-omega-3 fatty acids capsule 2 g  2 g Oral Daily Dagher, Ramez, MD        folic acid (FOLVITE) tablet 1 mg  1 mg Oral Daily Dagher, Ramez, MD        glucosamine-chondroitin 500-400 MG per capsule 1 capsule  1 capsule Oral Daily Dagher, Ramez, MD        levothyroxine (SYNTHROID/LEVOTHROID) tablet 88 mcg  88 mcg Oral Daily Dagher, Ramez, MD        melatonin tablet 3 mg  3 mg Oral At Bedtime PRN Dagher, Ramez, MD        multivitamin  with lutein (OCUVITE WITH LUTEIN) per capsule 1 capsule  1 capsule Oral Daily Dagher, Ramez, MD        OLANZapine (zyPREXA) tablet 2.5 mg  2.5 mg Oral TID PRN Dagher, Ramez, MD        Or    OLANZapine (zyPREXA) injection 2.5 mg  2.5 mg Intramuscular TID PRN Dagher, Ramez, MD        OLANZapine (zyPREXA) tablet 7.5 mg  7.5 mg Oral At Bedtime Dagher, Ramez, MD        polyethylene glycol (MIRALAX) Packet 17 g  17 g Oral Daily PRN Dagher, Ramez, MD        thiamine (B-1) tablet 100 mg   100 mg Oral Daily Dagher, Ramez, MD            Vitals  BP (!) 166/75 (BP Location: Left arm, Patient Position: Sitting, Cuff Size: Adult Regular)   Pulse 66   Temp 97.7  F (36.5  C) (Oral)   Wt 72.4 kg (159 lb 9.6 oz)   SpO2 98%   BMI 28.27 kg/m       Response  Patient will be placed in 202- at this time. Fall precautions will be implemented given patient history and admission criteria. Patient affect blunted, mood anxious, rate 10/10 but declined to take any medications.. Hygiene well groomed. Patient conversant to staff. Patient hungry-snacks provided. Patient was cold-blanket given. The patient had no questions or concerns.. Will continue to monitor and provide therapeutic interventions as needed.

## 2024-06-25 NOTE — PLAN OF CARE
Team Note Due:  Tuesday    Assessment/Intervention/Current Symtoms and Care Coordination:  Chart review and met with team, discussed pt progress, symptomology, and response to treatment.  Discussed the discharge plan and any potential impediments to discharge.    Provider notified writer he would like to petition for MI commitment. Documents given to MD to complete. Exhibit A completed. Sent to leadership for signature.    Called [facility] at 2:50pm and left a voicemail indicating a petition would be filed shortly and included 72HH details.    Obtained leadership signature at 2:57pm. Sent completed petition documents via secure email to PPS Intake.    Received call from PPS to complete intake. Case will be assigned right away and someone will be in touch in the morning to complete their screening. They are aware of 72HH.     Discharge Plan or Goal:  Discharge to home with OP supports pending stabilization of symptoms and medication management     Barriers to Discharge:  Patient requires further psychiatric stabilization due to current symptomology, medication management with changes subject to provider, coordination with outside supports, and aftercare planning. Pt is also currently involved in the commitment process.     Referral Status:  None at this time     Legal Status:  72HH - expires 6/27 at 00:01  [facility]  File Number: TBD  Start and expiration date of commitment: TBD    Contacts:  Tom Mendoza (son): [phone]       Upcoming Meetings and Dates/Important Information and next steps:  Follow commitment process

## 2024-06-25 NOTE — PLAN OF CARE
BEH IP Unit Acuity Rating Score (UARS)  Patient is given one point for every criteria they meet.    CRITERIA SCORING   On a 72 hour hold, court hold, committed, stay of commitment, or revocation. 1    Patient LOS on BEH unit exceeds 20 days. 0  LOS: 0   Patient under guardianship, 55+, otherwise medically complex, or under age 11. 1   Suicide ideation without relief of precipitating factors. 0   Current plan for suicide. 0   Current plan for homicide. 0   Imminent risk or actual attempt to seriously harm another without relief of factors precipitating the attempt. 0   Severe dysfunction in daily living (ex: complete neglect for self care, extreme disruption in vegetative function, extreme deterioration in social interactions). 1   Recent (last 7 days) or current physical aggression in the ED or on unit. 0   Restraints or seclusion episode in past 72 hours. 0   Recent (last 7 days) or current verbal aggression, agitation, yelling, etc., while in the ED or unit. 0   Active psychosis. 1   Need for constant or near constant redirection (from leaving, from others, etc).  0   Intrusive or disruptive behaviors. 0   Patient requires 3 or more hours of individualized nursing care per 8-hour shift (i.e. for ADLs, meds, therapeutic interventions). 0   TOTAL 4

## 2024-06-25 NOTE — PROGRESS NOTES
Triage & Transition Services, Extended Care     Client Name: Maribell Mendoza    Date: June 24, 2024    Patient was seen    Mode of Assessment:      Service Type: (P) refused to attend group session  Session Start Time:  (P) 1940    Session End Time: (P) 2000  Session Length: (P) 20  Site Location: [facility] EMERGENCY DEPT                             EMP11  Total Number ofAttendees: (P) 2  Topic:   (P) coping skills/lifestyle management   Response: (P) other (see comments) (pt declined to participate)     Ayaka Blake, [facility]   Licensed Mental Health Professional (LMHP), Extended Care  [phone]

## 2024-06-25 NOTE — PLAN OF CARE
"  Problem: Depression  Goal: Improved Mood  Outcome: Progressing  Intervention: Monitor and Manage Depressive Symptoms  Recent Flowsheet Documentation  Taken 6/25/2024 1700 by Keny Graves RN  Supportive Measures:   active listening utilized   self-care encouraged   self-reflection promoted   positive reinforcement provided   relaxation techniques promoted  Family/Support System Care:   presence promoted   self-care encouraged   involvement promoted     Problem: Psychotic Signs/Symptoms  Goal: Improved Behavioral Control (Psychotic Signs/Symptoms)  Outcome: Progressing  Intervention: Manage Behavior  Recent Flowsheet Documentation  Taken 6/25/2024 1700 by Keny Graves RN  De-Escalation Techniques: appropriate behavior reinforced   Goal Outcome Evaluation:    Plan of Care Reviewed With: patient      Patient was calm and pleasant at the beginning of shift. As shift progressed, patient became anxious, hyper-verbal. She blamed her hospitalization on her son/ neighbor and told writer she does not want to see them again. She refused to take  scheduled ZYPREXA 5 mg @ HS,saying \"I am not psychotic. I am perfectly okay.\" She requested that staff give her street clothes so she gets out of here because \"The 72 hours of my hold is over. If I am not discharged, I will pace the [address] till morning.\" She denied all psych symptoms and contracted for safety. Affect/mood was sad/depressed and patient exhibited  anxious behavior when communicating her needs. Vitals were normal and patient was alert and oriented. She ate and hydrated well, but was none compliant with medications. Will continue to monitor and encourage.    " patient

## 2024-06-25 NOTE — TELEPHONE ENCOUNTER
R: Per Taiwo with [facility], facility is waiting for EMS to transport pt.  Per Taiwo, ambulance should be showing up 11-11:30 PM to  pt.      Per CRN on unit 20, report has occurred and they are waiting for admit to arrive

## 2024-06-25 NOTE — PLAN OF CARE
"RN Shift Summary     Patient presented with an anxious affect. She was awake at the start of the shift and intermittently present in the milieu. She reported feeling \"overstimulated\" by the activity and noise in the milieu. She ate meals in the lounge but otherwise spent much of her time in her room. She slept between meals today. She was hyperverbal and speech was tangential. She had difficulty staying on topic in conversation. Patient's insight into her current situation is poor, and she blames her hospitalization on her son and neighbor's actions. She told numerous stories to writer about her recent trip to [place], about her recently  cat, about her other travels, etc. She was friendly in all interactions. She had difficulty remaining on task, but she was responsive to redirection. She displayed good understanding of her medications, including her scheduled daytime medications and olanzapine scheduled at night. She most scheduled medications in the morning with the exception of fish oil and glucosamine-chondroitin; she was concerned about the size of the fish oil pills, due to a history of swallowing difficulty, and she declined the glucosamine, stating that she has been doing exercises and losing weight to help with her arthritis.      Vitals: B/P: 135/83, T: 96.5, P: 77, R: 18    "

## 2024-06-25 NOTE — DISCHARGE INSTRUCTIONS
Behavioral Discharge Planning and Instructions    Summary: You were admitted on 6/25/2024 due to concern for psychosis. You were treated by Kizzy Hill MD and discharged on 7/5/2024 from [facility]    Main Diagnosis:   Bipolar I, current episode manic with psychotic features    Commitment: Stay of Commitment: A petition for commitment was made and you did agree to a stayed commitment. This starts on 7/3/2024 and will remain in effect until 1/3/2025. The terms of the stayed commitment do require that you follow recommendations for medication and treatment.  As part of your agreement with the Stay of Commitment, you will be assigned a  through [facility]. You are expected to maintain contact with your  throughout the duration of the stayed commitment. You will be contacted next week to complete the case management intake with [facility].      [facility] 55+ Intensive Outpatient Program:  You completed a Diagnostic Assessment to be referred to [facility]'s 55+ Intensive Outpatient Program (IOP). You will be contacted by one of the Navigators to coordinate your start date for the program but if you haven't received a call or would like to follow up on the status of your referral, please contact the program directly at [phone].   This program is in-person at [facility] in the [facility] ([address] [place] [address]).      Health Care Follow-up:     Primary Care Appointment: Wednesdsay July 17, 2024 at 9:15 AM  Provider: Dr. Osiris Fernandes MD  Location: [facility]  Address: [address] [place] [address]  Phone: [phone]     Appointment: Psychiatry   Date/time: Monday July 22nd, 2024 @ 10:00 AM In person  Provider:  Dary ZHANG  Address: Ivanna Lira [address] [place] [address]  Phone: [phone]  Fax: [phone]   Note:   This is a 75  minute appointment. Intake paperwork to be completed beforehand will be sent to [email or web address]. Please arrive 30 minutes early if you prefer to complete paperwork in person.   Follow up: Monday August 26th, 2024 @ 10:00 AM In person      Endocrinology Appointment: Thursday July 25, 2024 at 1:05 PM  Provider: Dr. Jolene Bruce MD  Location: [facility] [place] [address]  Address: [address], [place] [address]  Phone: [phone]        Information will be faxed to your outpatient providers to ensure a healthy continuity of care for you.     Attend all scheduled appointments with your outpatient providers. Call at least 24 hours in advance if you need to reschedule an appointment to ensure continued access to your outpatient providers.     Major Treatments, Procedures and Findings:  You were provided with: a psychiatric assessment, assessed for medical stability, medication evaluation and/or management, group therapy, individual therapy, milieu management, and medical interventions    Symptoms to Report: feeling more aggressive, increased confusion, losing more sleep, mood getting worse, or thoughts of suicide    Early warning signs can include: increased depression or anxiety sleep disturbances increased thoughts or behaviors of suicide or self-harm  increased unusual thinking, such as paranoia or hearing voices    Safety and Wellness:  Take all medicines as directed.  Make no changes unless your doctor suggests them.      Follow treatment recommendations.  Refrain from alcohol and non-prescribed drugs.  If there is a concern for safety, call 911.    Resources:   Mental Health Crisis Resources  Throughout [place]: call **CRISIS (**485536)  Crisis Text Line: is available for free, 24/7 by texting [place] to 108178  Suicide Awareness Voices of Education (SAVE) (www.save.org): 067-103-ADFR (7203)  The National Suicide Prevention Lifeline is now: 988 Suicide and Crisis  Lifeline. Call 988 anytime.  National [place] on Mental Illness (www.[place].dawit.org): 340.339.1939 or 226-168-6340.  Jzxj6ytpr: text the word LIFE to 68951 for immediate support and crisis intervention  Mental Health Consumer/Survivor Network of [place] (www.mhcsn.net): 177.718.2385 or 225-395-7076  Mental Health Association of [place] (www.mentalhealth.org): 277.210.5934 or 944-530-4737  Peer Support Connection [place] Warmline (PSC) 1-494.826.7197 Available from 5pm - 9am (7 days a week/365 days a year)  [facility] 1-613.565.4352      General Medication Instructions:   See your medication sheet(s) for instructions.   Take all medicines as directed.  Make no changes unless your doctor suggests them.   Go to all your doctor visits.  Be sure to have all your required lab tests. This way, your medicines can be refilled on time.  Do not use any drugs not prescribed by your doctor.  Avoid alcohol.    Advance Directives:   Scanned document on file with [facility] [employer]? No scanned doc  Is document scanned? No. Copy Requested. (Patient stated she has an advance directive)  Honoring Choices Your Rights Handout: Informed and given  Was more information offered? Materials given    The Treatment team has appreciated the opportunity to work with you. If you have any questions or concerns about your recent admission, you can contact the unit which can receive your call 24 hours a day, 7 days a week. They will be able to get in touch with a Provider if needed. The unit number is [phone] .

## 2024-06-25 NOTE — PROGRESS NOTES
Pt left the unit at 12:44am with EMS to transfer to [place] [address] via ambulance.  Pt continues to be agreeable with transfer, aware she is on a 72hh.  VSS.  Denies dizziness, nausea, pain.  All belongings transferred with pt.  [facility] updated that pt is en route.

## 2024-06-25 NOTE — PLAN OF CARE
06/25/24 0220   Patient Belongings   Disposition of meds  Sent to security/pharmacy per site process   Patient Belongings locker   Patient Belongings Put in Hospital Secure Location (Security or Locker, etc.) shoes;glasses;clothing;other (see comments)   Belongings Search Yes   Clothing Search Yes   Second Staff Meti,RN     Patients Belongings: 3 Slippers, Blink eye drops, 9 pairs of underwear, 2 pairs of shorts, 6 pairs of pants/leggings, 8 T-shirts, 2 knee sleeves, Sensitive skin mousse and envelope with patient information and coloring pages.     To Pharmacy: Lubricating eye drops     Goal Outcome Evaluation:                        Admission:  I am responsible for any personal items that are not sent to the safe or pharmacy.  [facility] is not responsible for loss, theft or damage of any property in my possession.    Signature:  _________________________________ Date: _______  Time: _____                                              Staff Signature:  ____________________________ Date: ________  Time: _____      2nd Staff person, if patient is unable/unwilling to sign:    Signature: ________________________________ Date: ________  Time: _____     Discharge:  [facility] has returned all of my personal belongings:    Signature: _________________________________ Date: ________  Time: _____                                          Staff Signature:  ____________________________ Date: ________  Time: _____

## 2024-06-25 NOTE — PLAN OF CARE
Problem: Sleep Disturbance  Goal: Adequate Sleep/Rest  Outcome: Progressing   Patient appears sleeping most of the shift. No complaints of pain and discomfort. Patient continues on q15 minutes safety checks, no behavioral issues noted. Will continue to monitor the patient and provide therapeutic intervention as needed. Will continue with current plan of care. Notify MD with any concerns. The patient had 5 total hours of sleep this shift.
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
## 2024-06-25 NOTE — PLAN OF CARE
06/25/24 1500   General Information   Has Not Attended OT as of: 06/25/24     Pt has not attended scheduled occupational therapy sessions. Encourage attendance and participation. Pt will be given self-assessment form, and OT staff will explain the purpose of including them in their treatment plan and offer options for meeting their needs.
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
## 2024-06-25 NOTE — PHARMACY-ADMISSION MEDICATION HISTORY
Please see Admission Medication History note completed on 6/22 under previous encounter for information regarding prior to admission medications.      Nayeli Mcginnis, PharmD  *[phone]

## 2024-06-25 NOTE — PLAN OF CARE
Initial meeting note:    Therapist introduced self to patient and discussed psychotherapy service available to patient.     Pt response: Pt expressed interest in meeting 1:1, but stated it has to be in her room as this is the only place she feels safe. Later writer saw her out in the milieu on exercise bike, appeared calm.     Plan: Made plan to meet with Pt later this week.      Maryana Aragon, [facility]  Psychotherapist

## 2024-06-26 PROCEDURE — 124N000002 HC R&B MH UMMC

## 2024-06-26 PROCEDURE — 99232 SBSQ HOSP IP/OBS MODERATE 35: CPT | Mod: GC | Performed by: PSYCHIATRY & NEUROLOGY

## 2024-06-26 PROCEDURE — H2032 ACTIVITY THERAPY, PER 15 MIN: HCPCS

## 2024-06-26 PROCEDURE — 250N000013 HC RX MED GY IP 250 OP 250 PS 637

## 2024-06-26 RX ADMIN — LEVOTHYROXINE SODIUM 88 MCG: 88 TABLET ORAL at 08:45

## 2024-06-26 RX ADMIN — Medication 2 G: at 08:45

## 2024-06-26 RX ADMIN — Medication 1 CAPSULE: at 08:45

## 2024-06-26 RX ADMIN — FOLIC ACID 1 MG: 1 TABLET ORAL at 08:46

## 2024-06-26 RX ADMIN — Medication 1 CAPSULE: at 08:46

## 2024-06-26 RX ADMIN — ATORVASTATIN CALCIUM 20 MG: 20 TABLET, FILM COATED ORAL at 08:45

## 2024-06-26 RX ADMIN — THIAMINE HCL TAB 100 MG 100 MG: 100 TAB at 08:46

## 2024-06-26 RX ADMIN — OLANZAPINE 7.5 MG: 5 TABLET, FILM COATED ORAL at 00:30

## 2024-06-26 RX ADMIN — OLANZAPINE 7.5 MG: 5 TABLET, FILM COATED ORAL at 21:10

## 2024-06-26 ASSESSMENT — ACTIVITIES OF DAILY LIVING (ADL)
ADLS_ACUITY_SCORE: 48
ADLS_ACUITY_SCORE: 51
ADLS_ACUITY_SCORE: 48
ADLS_ACUITY_SCORE: 51
ADLS_ACUITY_SCORE: 48
ADLS_ACUITY_SCORE: 51
ADLS_ACUITY_SCORE: 51
ADLS_ACUITY_SCORE: 48
ADLS_ACUITY_SCORE: 51
ADLS_ACUITY_SCORE: 48
ADLS_ACUITY_SCORE: 51
ADLS_ACUITY_SCORE: 48
ADLS_ACUITY_SCORE: 51
ADLS_ACUITY_SCORE: 51
ADLS_ACUITY_SCORE: 48

## 2024-06-26 NOTE — PLAN OF CARE
Problem: Adult Behavioral Health Plan of Care  Goal: Plan of Care Review  Outcome: Progressing  Flowsheets (Taken 6/26/2024 1600)  Patient Agreement with Plan of Care: agrees     Problem: Depression  Goal: Improved Mood  Outcome: Progressing   Goal Outcome Evaluation:    Plan of Care Reviewed With: patient      Pt has been visible in the milieu, alert and able to make needs known. Pt has a flat affect but very cooperative and brightens up upon assessment. Pt is social with some select peers, was doing the bicycle, coloring and playing puzzles at the dining area. Pt c/o lower abdominal pain rated 4/10 denied intervention and stated it's not new. Pt endorsed depression minimal, denied anxiety, denied SI, SIB, HI, denied A/V/Hallucination and contracted for safety in the unit. Pt stated she's sad coming here but again on the hand she's glad she's getting the help she needs and she's very grateful. Pt is compliant with medication, hygiene is appropriate, adequate food and fluid intake.    Pt on  as of today 6/26/24 @ 3744, [facility] confirmed, paper work given to the pt and a copy left on the  desk.

## 2024-06-26 NOTE — PLAN OF CARE
Goal Outcome Evaluation:    Problem: Sleep Disturbance  Goal: Adequate Sleep/Rest  Outcome: Progressing     Pt awake sitting in the lounge at the start of the shift. Went back to her room at 0030.  Safety  rounds completed throughout the night. Pt observed to have slept for 5.25 hours.

## 2024-06-26 NOTE — PROGRESS NOTES
Rehab Group    Start time: 1320  End time: 1420  Patient time total: 5 minutes (no charge)    attended partial group    #5 attended   Group Type: occupational therapy   Group Topic Covered: coping skills     Group Session Detail:  OT clinic     Patient Response/Contribution:  left group on several occasions     Patient Detail:    Pt attended approximately x5 minutes (no charge) of occupational therapy clinic to facilitate coping skill exploration, creative expression within personally meaningful activities, and clinical observation of social, cognitive, and kinesthetic performance skills. Pt response: Oriented to group materials and politely declined initiating a task today, instead requesting to look out the windows for a few minutes. Calm and pleasant in brief interactions. Will continue to assess.         No Charge    Patient Active Problem List   Diagnosis    Chronic pain of left knee    Psychosis (H)    Acute encephalopathy    Paranoia (H)    Disorganized behavior    Psychosis, unspecified psychosis type (H)    Psychotic episode (H)

## 2024-06-26 NOTE — PLAN OF CARE
Problem: Depression  Goal: Improved Mood  Outcome: Progressing  Intervention: Monitor and Manage Depressive Symptoms  Recent Flowsheet Documentation  Taken 6/26/2024 1100 by Fátima eCdeño RN  Supportive Measures:   active listening utilized   self-care encouraged   self-reflection promoted   positive reinforcement provided   relaxation techniques promoted  Family/Support System Care:   presence promoted   self-care encouraged   involvement promoted     Problem: Anxiety  Goal: Anxiety Reduction or Resolution  Outcome: Progressing  Intervention: Promote Anxiety Reduction  Recent Flowsheet Documentation  Taken 6/26/2024 1100 by Fátima Cedeño RN  Supportive Measures:   active listening utilized   self-care encouraged   self-reflection promoted   positive reinforcement provided   relaxation techniques promoted  Family/Support System Care:   presence promoted   self-care encouraged   involvement promoted   Goal Outcome Evaluation:      Plan of Care Reviewed With: patient    Patient was present at the milieu at the start of shift. Patient was calm and receptive. She was hyper verbal. Affect flat and blunted. Patient is upset that her neighbor and son collaborated together to bring her to the hospital. The first thing she told the writer was that she wanted out of here because she does not belong here. Patient told the writer a long story of how she was just parking her stuff together, those for recycling and those for giving away and before she knew it her neighbor had reported her to her son that she was not all together. Patient said she needs to be released today. Patient denied all psych symptoms and contracted for safety. Patient was compliant with medication. Patient said her mood was much better today than yesterday because the medication that was given to her last night helped her to sleep. Patient is eating and drinking well. Hygiene is appropriate. She was visible in the milieu and intermittently  pacing the hallway. Patient interacted with select peer in the dining room. Patient told the writer that she will comply with all of her cares and medication so that she can get out of here. The [facility] reported that [facility] called and said they have accepted patient's petition and will be changing her status from 72 hour hold to court hold. The will call or fax the document. Will continue plan of care and notify MD if any issues comes up.
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
## 2024-06-26 NOTE — PLAN OF CARE
Problem: Sleep Disturbance  Goal: Adequate Sleep/Rest  Outcome: Progressing   Goal Outcome Evaluation:    Patient present in [facility] at start of shift. She approached the nursing desk and talked at length about how she feels she is being unjustly held here by her provider and demanded that staff make calls to her rabbi to get her released tonight. Staff eventually able to redirect her to discuss her concerns with her team in the morning. Accepted her scheduled bedtime Zyprexa (7.5 mg) that she had previously refused and went to sleep.

## 2024-06-26 NOTE — PLAN OF CARE
Team Note Due:  Tuesday    Assessment/Intervention/Current Symtoms and Care Coordination:  Chart review and met with team, discussed pt progress, symptomology, and response to treatment.  Discussed the discharge plan and any potential impediments to discharge.    Provider would like to file a [place] with the petition for MI commitment. Documents given to MD to complete.    Pt met with PPS. Writer relayed update on pending [place] paperwork to PPS.    Rescheduled pt's PCP appt from 6/27 to 7/17.    Received update from PPS that petition is being supported. MD and RN updated regarding pending court hold status.     Discharge Plan or Goal:  Discharge to home with OP supports pending stabilization of symptoms and medication management     Barriers to Discharge:  Patient requires further psychiatric stabilization due to current symptomology, medication management with changes subject to provider, coordination with outside supports, and aftercare planning. Pt is also currently involved in the commitment process.     Referral Status:  None at this time     Legal Status:  Court hold  [facility]: [place]  File Number: [id number]  Start and expiration date of commitment: TBD    Tricia Brothers (PPS):  [phone] (office)  [phone] (cell)    Contacts:  Tom Mendoza (son): [phone]       Upcoming Meetings and Dates/Important Information and next steps:  Follow commitment process

## 2024-06-27 PROCEDURE — G0177 OPPS/PHP; TRAIN & EDUC SERV: HCPCS

## 2024-06-27 PROCEDURE — 250N000013 HC RX MED GY IP 250 OP 250 PS 637

## 2024-06-27 PROCEDURE — 99232 SBSQ HOSP IP/OBS MODERATE 35: CPT | Mod: GC | Performed by: PSYCHIATRY & NEUROLOGY

## 2024-06-27 PROCEDURE — 124N000002 HC R&B MH UMMC

## 2024-06-27 RX ORDER — OLANZAPINE 10 MG/1
10 TABLET ORAL AT BEDTIME
Status: CANCELLED | OUTPATIENT
Start: 2024-06-27

## 2024-06-27 RX ORDER — OLANZAPINE 10 MG/1
10 TABLET ORAL AT BEDTIME
Status: DISCONTINUED | OUTPATIENT
Start: 2024-06-27 | End: 2024-07-05 | Stop reason: HOSPADM

## 2024-06-27 RX ADMIN — THIAMINE HCL TAB 100 MG 100 MG: 100 TAB at 08:51

## 2024-06-27 RX ADMIN — FOLIC ACID 1 MG: 1 TABLET ORAL at 08:52

## 2024-06-27 RX ADMIN — Medication 1 CAPSULE: at 08:52

## 2024-06-27 RX ADMIN — ATORVASTATIN CALCIUM 20 MG: 20 TABLET, FILM COATED ORAL at 08:52

## 2024-06-27 RX ADMIN — LEVOTHYROXINE SODIUM 88 MCG: 88 TABLET ORAL at 08:51

## 2024-06-27 RX ADMIN — OLANZAPINE 10 MG: 10 TABLET, FILM COATED ORAL at 21:53

## 2024-06-27 ASSESSMENT — ACTIVITIES OF DAILY LIVING (ADL)
ADLS_ACUITY_SCORE: 51
HYGIENE/GROOMING: INDEPENDENT
ADLS_ACUITY_SCORE: 51
ADLS_ACUITY_SCORE: 51
ADLS_ACUITY_SCORE: 48
ADLS_ACUITY_SCORE: 51
LAUNDRY: WITH SUPERVISION
ADLS_ACUITY_SCORE: 51
ADLS_ACUITY_SCORE: 51
ADLS_ACUITY_SCORE: 48
ORAL_HYGIENE: INDEPENDENT
ADLS_ACUITY_SCORE: 51
ADLS_ACUITY_SCORE: 51
ADLS_ACUITY_SCORE: 48
ADLS_ACUITY_SCORE: 51
DRESS: SCRUBS (BEHAVIORAL HEALTH)
ADLS_ACUITY_SCORE: 51

## 2024-06-27 NOTE — PLAN OF CARE
No PRNs given or requested this shift. Pt remained in her room the entire shift. Safety checks completed every 15 minutes ; no concerns noted. Pt appears to have slept for 6.25 hours; will continue to monitor and offer support.     Problem: Sleep Disturbance  Goal: Adequate Sleep/Rest  Outcome: Progressing   Goal Outcome Evaluation:

## 2024-06-27 NOTE — PROGRESS NOTES
Rehab Group    Start time: 2000  End time: 2050  Patient time total: 10 minutes    attended partial group    #4 attended   Group Type: recreation   Group Topic Covered: Physical activity and relaxation        Group Session Detail:  Stress relief     Patient Response/Contribution:  cooperative with task, attentive, and actively engaged       Patient Detail:    Pt actively participated in a structured Therapeutic Recreation group with a focus on leisure participation, socializing, and exercise. Pt participated in the guided exercise for the full duration of the group. Pt followed along, engaged in the guided chair exercise routine and added to the discussion prompts throughout the routine.  Pt was encouraged to use positive imagery with the deep breathing and stretching to foster relaxation, improves focus, and reduce stress.             Activity Therapy Per 15 minutes () Other Rehab therapies    Patient Active Problem List   Diagnosis    Chronic pain of left knee    Psychosis (H)    Acute encephalopathy    Paranoia (H)    Disorganized behavior    Psychosis, unspecified psychosis type (H)    Psychotic episode (H)

## 2024-06-27 NOTE — PLAN OF CARE
Team Note Due:  Tuesday    Assessment/Intervention/Current Symtoms and Care Coordination:  Chart review and met with team, discussed pt progress, symptomology, and response to treatment.  Discussed the discharge plan and any potential impediments to discharge.    Sent email to coordinate Zoom links for hearings next week.     Discharge Plan or Goal:  Discharge to home with OP supports pending stabilization of symptoms and medication management     Barriers to Discharge:  Patient requires further psychiatric stabilization due to current symptomology, medication management with changes subject to provider, coordination with outside supports, and aftercare planning. Pt is also currently involved in the commitment process.     Referral Status:  None at this time     Legal Status:  Court hold  [facility]: [place]  File Number: [id number]  Start and expiration date of commitment: TBD    Upcoming Hearings:  Preliminary/Probable Cause Hearing on 07/01/2024 at 9:00 AM  Commitment Hearing on 07/03/2024 at 9:00 AM    Tricia Brothers (PPS):  [phone] (office)  [phone] (cell)    Contacts:  Tom Mendoza (son): [phone]       Upcoming Meetings and Dates/Important Information and next steps:  Follow commitment process  Discuss resuming [facility] with pt's son when closer to discharge

## 2024-06-27 NOTE — PROGRESS NOTES
"  ----------------------------------------------------------------------------------------------------------  [facility]  Psychiatry Progress Note  Hospital Day #2     Interim History:     The patient's care was discussed with the treatment team and chart notes were reviewed.    Vitals: Temp: 97.7  F (36.5  C) Temp src: Oral BP: (!) 141/81 Pulse: 69   Resp: 18 SpO2: 100 %   Sleep: 6.25 hours (06/27/24 0617)  Scheduled medications: Took all scheduled medications as prescribed   Psychiatric PRN medications:  No psychiatric prns given    Staff Report:   In summary, pt was visible in the milieu interacting with a select peer. Pt reported better mood to staff and attributed this to psychiatric medication taken at . Pt briefly joined group, but did politely declined to participate in activity. Pt observed playing with puzzles, coloring, and biking in milieu. Pt expressed 4/10 abdominal pain, but stated this was not new, and did not request PRN for this.     Subjective:     Patient Interview:  Maribell Mendoza was seen by the team in her room. Pt said she is feeling \"continually more stable.\" Pt says she is feeling more comfortable with \"who I am and what I am.\" Pt says she always wants to help out. Pt reiterates the story of when she had a fight with her son.    Pt says that \"whatever happened\" probably looked \"pretty wild and crazy.\" Pt said that on the outside she may have looked \"out of control,\" but that while she was \"high as a kite\" she was very happy. Pt said she was having a lot of energy, but denied many thoughts. Pt said that her neighbor misinterpreted her actions because she didn't know the pt was moving houses.Pt said that she did not run water in her house. Pt said that the city was clearing the drains. Pt said that somebody closed the drains and left them, but it wasn't her. Pt said she was glad that her son was concerned about her, but that he was concerned " for no reason.     Pt says that she gets stage fright, even in her own house.     Pt says that she completely trusts the medications she is given. She also said that she does take her medications at home. Pt said that having multiple medication bottles for the same prescription as well as supplements has been confusing for her. Pt said that we can release her home by ambulance or [facility].     Pt denies SI/HI.     ROS:  - No acute concerns     Objective:     Vitals:  BP (!) 141/81 (BP Location: Left arm, Patient Position: Sitting, Cuff Size: Adult Regular)   Pulse 69   Temp 97.7  F (36.5  C) (Oral)   Resp 18   Wt 73.8 kg (162 lb 12.8 oz)   SpO2 100%   BMI 28.84 kg/m      Allergies:  Allergies   Allergen Reactions    Alendronate Other (See Comments)     PN:  SWALLOWING    Sulfa Antibiotics Hives     PN: HIVES       Current Medications:  Scheduled:  Current Facility-Administered Medications   Medication Dose Route Frequency Provider Last Rate Last Admin    acetaminophen (TYLENOL) tablet 650 mg  650 mg Oral Q4H PRN Dagher, Ramez, MD        alum & mag hydroxide-simethicone (MAALOX) suspension 30 mL  30 mL Oral Q4H PRN Dagher, Ramez, MD        atorvastatin (LIPITOR) tablet 20 mg  20 mg Oral Daily Dagher, Ramez, MD   20 mg at 06/26/24 0845    folic acid (FOLVITE) tablet 1 mg  1 mg Oral Daily Dagher, Ramez, MD   1 mg at 06/26/24 0846    levothyroxine (SYNTHROID/LEVOTHROID) tablet 88 mcg  88 mcg Oral Daily Dagher, Ramez, MD   88 mcg at 06/26/24 0845    melatonin tablet 3 mg  3 mg Oral At Bedtime PRN Dagher, Ramez, MD        multivitamin  with lutein (OCUVITE WITH LUTEIN) per capsule 1 capsule  1 capsule Oral Daily Dagher, Ramez, MD   1 capsule at 06/26/24 0846    OLANZapine (zyPREXA) tablet 2.5 mg  2.5 mg Oral TID PRN Dagher, Ramez, MD        Or    OLANZapine (zyPREXA) injection 2.5 mg  2.5 mg Intramuscular TID PRN Dagher, Ramez, MD        OLANZapine (zyPREXA) tablet 7.5 mg  7.5 mg Oral At Bedtime Dagher, Ramez, MD    7.5 mg at 06/26/24 2110    polyethylene glycol (MIRALAX) Packet 17 g  17 g Oral Daily PRN Dagher, Ramez, MD        thiamine (B-1) tablet 100 mg  100 mg Oral Daily Dagher, Ramez, MD   100 mg at 06/26/24 0846       PRN:  Current Facility-Administered Medications   Medication Dose Route Frequency Provider Last Rate Last Admin    acetaminophen (TYLENOL) tablet 650 mg  650 mg Oral Q4H PRN Dagher, Ramez, MD        alum & mag hydroxide-simethicone (MAALOX) suspension 30 mL  30 mL Oral Q4H PRN Dagher, Ramez, MD        atorvastatin (LIPITOR) tablet 20 mg  20 mg Oral Daily Dagher, Ramez, MD   20 mg at 06/26/24 0845    folic acid (FOLVITE) tablet 1 mg  1 mg Oral Daily Dagher, Ramez, MD   1 mg at 06/26/24 0846    levothyroxine (SYNTHROID/LEVOTHROID) tablet 88 mcg  88 mcg Oral Daily Dagher, Ramez, MD   88 mcg at 06/26/24 0845    melatonin tablet 3 mg  3 mg Oral At Bedtime PRN Dagher, Ramez, MD        multivitamin  with lutein (OCUVITE WITH LUTEIN) per capsule 1 capsule  1 capsule Oral Daily Dagher, Ramez, MD   1 capsule at 06/26/24 0846    OLANZapine (zyPREXA) tablet 2.5 mg  2.5 mg Oral TID PRN Dagher, Ramez, MD        Or    OLANZapine (zyPREXA) injection 2.5 mg  2.5 mg Intramuscular TID PRN Dagher, Ramez, MD        OLANZapine (zyPREXA) tablet 7.5 mg  7.5 mg Oral At Bedtime Dagher, Ramez, MD   7.5 mg at 06/26/24 2110    polyethylene glycol (MIRALAX) Packet 17 g  17 g Oral Daily PRN Dagher, Ramez, MD        thiamine (B-1) tablet 100 mg  100 mg Oral Daily Dagher, Ramez, MD   100 mg at 06/26/24 0846       Labs and Imaging:  New results:   No new results over past 24 hours    Data this admission:  - CBC unremarkable  - BMP unremarkable  - TSH normal  - UDS negative   - Hgb   - Vit B12 1,707 (6/20/2024)  - Folate >40 (6/20/2024)  - Urinalysis unremarkable  - EKG (6/13/2024) Sinus rhythm; Cannot rule out Inferior infarct, age undetermined;   Cannot rule out Anterior infarct, age undetermined; Abnormal ECG. QTc 435ms     Mental Status  "Exam:     Oriented to:  Grossly Oriented  General:  Awake and Alert  Appearance:  appears stated age, Grooming is adequate, and Dressed in personal clothing  Behavior/Attitude:  Cooperative, Engaged, Easy to redirect, and Open  Eye Contact: Appropriate  Psychomotor: No evidence of tics, dystonia, or tardive dyskinesia  and gesticulations no catatonia present  Speech:  appropriate volume/tone, talkative, and expansive  Language: Fluent in English with appropriate syntax and vocabulary.  Mood:  \"continually more stable\"  Affect:  broad/full and euthymic  Thought Process:  looseness of association and circumstantial  Thought Content:   No SI/HI/AH/VH, does not appear to be responding to internal stimuli, and delusions; delusions of paranoia  Associations:  questionable  Insight:  impaired due to thinking she is at her baseline   Judgment:  impaired due to believing she does not require care for mental illness   Impulse control: limited  Attention Span:  adequate for conversation  Concentration:  grossly intact  Recent and Remote Memory:  grossly intact, recalls details from last hospitalization  Fund of Knowledge: average  Muscle Strength and Tone: normal  Gait and Station: Normal     Psychiatric Assessment     Maribell Mendoza is a 74 year old female with possible previous psychiatric diagnosis of bipolar I disorder (1970s) who presented via EMS with psychosis in the context of recent travel. Most recent psychiatric hospitalization was in the 1970s, and had a hospitalization for medical rule out of current symptoms 6/13/2024-6/20. Significant symptoms on admission included paranoid delusions and verbal aggression. The MSE on admission was pertinent for labile affect, inattentive concentration, paranoia, and tangential thought process with flight of ideas. Biological contributions to mental health presentation include a possible past diagnosis of bipolar I disorder, and possible misuse of alcohol for insomnia. " Psychological contributions to mental health presentation include lack of insight. Social factors contributing to mental health presentation include the pt living alone for past 30 years following divorce from spouse. Protective factors include supportive family and participation in [employer] community.      In summary, the patient's reported symptoms of decreased need for sleep, paranoid delusions, and pressured speech of acute onset are consistent with emmy with psychotic features. Given the presentation and past diagnosis of bipolar I disorder, the most likely diagnosis is decompensation of bipolar I disorder. She is benefitting from pharmacologic stabilization and establishment of longitudinal psychiatric care this admission.        Psychiatric Plan by Diagnosis      Today's changes:  - Olanzapine HS dose increased to 10mg     # primary psychosis vs encephalopathy unspecified   1. Medications:  - Continue PTA Olanzapine (7.5mg). Increased to 10mg (06/27/2024)  - PTA thiamine      2. Pertinent Labs/Monitoring:   - CBC WNL on 06/22  - Urinalysis WNL on 06/22.  - BMP WNL on 06/20  - MRI Brain with no acute abnormalities on 06/18  - Paraneoplastic panel pending  - Chest XR 6/19 showed no acute abnormalities   - EKG last on 6/13 showed  NSR  - B12 1,707 6/20 and folate elevated at >40.0 6/20  - Ammonia on 6/13 was wnl     3. Additional Plans:  - Patient will be treated in therapeutic milieu with appropriate individual and group therapies as described     Psychiatric Hospital Course:      Maribell Mendoza was admitted to [facility] as a voluntary patient  Medications:  PTA Olanzapine (7.5mg) was continued as well as pt's PTA nonpsychiatric medications   Olanzapine HS PO increased to 10mg (06/27/2024).  No PTA meds were held.   New medications started at the time of admission include none.      The risks, benefits, alternatives, and side effects were discussed and understood by the patient and other  caregivers.     Medical Assessment and Plan     Medical diagnoses to be addressed this admission:    #Hyperlipidemia  - Continue atorvastatin 20mg     #Osteoporosis  - 400-400mg  Glucosamine-chondroitin -> discontinued on 6/26/2024     #Hyperthoidism with Hashimoto's Thyroiditis  - Continue Levothroxine 88mcg     # Medical work up for psychosis  Medical admission on 06/13-06/20. Originally presented mild CMP changes: with elevated BUN 27.3, Cr 1.01, and anion gap of 17. Total michel slightly elevated at 1.3 as well as lipase of 63.  UA showed some ketones. BMP showed intermittent improvement and worsening over that hospital stay.   - Per neuro consult, recommended autoimmune work up, received a Paraneoplastic panel which was unremarkable, and a Brain MRI which showed no acute intercranial abnormality      Medical course: Patient was physically examined by the ED prior to being transferred to the unit and was found to be medically stable and appropriate for admission.      Consults:  none     Checklist     Legal Status: Orders Placed This Encounter      Legal status Court Hold        Safety Assessment:   Behavioral Orders   Procedures    Code 1 - Restrict to Unit    Routine Programming     As clinically indicated    Status 15     Every 15 minutes.       Risk Assessment:  Risk for harm is moderate-high.  Risk factors: family dynamics and impulsive  Protective factors: family     SIO: Not indicated at this time    Disposition: TBD. Disposition pending clinical stabilization, medication optimization and development of an appropriate discharge plan      Attestations     Antonio Lees, MS3  [facility] Medical Student      I was present with the medical student who participated in the service and in the documentation of the note.  I have verified the history and personally performed the physical exam and medical decision making. I agree with the assessment and plan of care as documented in the note.    This patient was seen and  discussed with my attending physician.    Rudy Hung MD  Psychiatry Resident Physician    Attestation:  This patient has been seen and evaluated by me, Carole Kim MD.  I have discussed this patient with the house staff team including the resident and medical student and I agree with the findings and plan in this note. I have reviewed today's vital signs, medications, labs and imaging. Carole Kim MD.

## 2024-06-27 NOTE — PROGRESS NOTES
Rehab Group    Start time: 1015  End time: 1115  Patient time total: 60 minutes    attended full group    #6 attended   Group Type: occupational therapy   Group Topic Covered: coping skills     Group Session Detail:  OT clinic     Patient Response/Contribution:  cooperative with task, attentive, and actively engaged     Patient Detail:    Pt actively participated in occupational therapy clinic to facilitate coping skill exploration, creative expression within personally meaningful activities, and clinical observation of social, cognitive, and kinesthetic performance skills. Pt response: Independent to initiate, gather materials, sequence, and adjust to workspace demands as needed. Demonstrated good focus, planning, and attention to detail for selected creative expression task. Able to ask for assistance as needed, and appeared comfortable interacting with peers and staff, though spent a majority of this group quietly focused on her task. Pleasant and polite with a bright affect observed on approach. Will continue to assess.        Train & Education Service Per Session 45 + Minutes () OT Group code    Patient Active Problem List   Diagnosis    Chronic pain of left knee    Psychosis (H)    Acute encephalopathy    Paranoia (H)    Disorganized behavior    Psychosis, unspecified psychosis type (H)    Psychotic episode (H)

## 2024-06-27 NOTE — PLAN OF CARE
BEH IP Unit Acuity Rating Score (UARS)  Patient is given one point for every criteria they meet.    CRITERIA SCORING   On a 72 hour hold, court hold, committed, stay of commitment, or revocation. 1    Patient LOS on BEH unit exceeds 20 days. 0  LOS: 2   Patient under guardianship, 55+, otherwise medically complex, or under age 11. 1   Suicide ideation without relief of precipitating factors. 0   Current plan for suicide. 0   Current plan for homicide. 0   Imminent risk or actual attempt to seriously harm another without relief of factors precipitating the attempt. 0   Severe dysfunction in daily living (ex: complete neglect for self care, extreme disruption in vegetative function, extreme deterioration in social interactions). 1   Recent (last 7 days) or current physical aggression in the ED or on unit. 0   Restraints or seclusion episode in past 72 hours. 0   Recent (last 7 days) or current verbal aggression, agitation, yelling, etc., while in the ED or unit. 0   Active psychosis. 1   Need for constant or near constant redirection (from leaving, from others, etc).  0   Intrusive or disruptive behaviors. 0   Patient requires 3 or more hours of individualized nursing care per 8-hour shift (i.e. for ADLs, meds, therapeutic interventions). 0   TOTAL 4

## 2024-06-27 NOTE — PLAN OF CARE
Problem: Adult Behavioral Health Plan of Care  Goal: Plan of Care Review  Outcome: Progressing  Flowsheets (Taken 6/27/2024 1102)  Plan of Care Reviewed With: patient  Patient Agreement with Plan of Care: agrees   Goal Outcome Evaluation:    Plan of Care Reviewed With: patient     Patient is alert and able to make needs known. Patient has been visible in the milieu this shift. Affect full range and bright. Very cooperative with assessment and compliant with medication. Patient is social with some select peers, was doing the bicycle and playing puzzles at the dining area. She participated in group and was doing coloring. Patient said her mood is quite stable now and she is happy to be here now because she is getting the help that she needs. Patient denied anxiety, denied SI, SIB, HI, denied A/V/Hallucination and contracted for safety.  Hygiene is appropriate, took a shower, adequate food and fluid intake. Patient was doing laps today for exercise. She said that she ate too much. Will continue plan of care and notify provider if any issues arises.

## 2024-06-27 NOTE — PLAN OF CARE
"  Problem: Anxiety  Goal: Anxiety Reduction or Resolution  Outcome: Progressing     Problem: Psychotic Signs/Symptoms  Goal: Improved Behavioral Control (Psychotic Signs/Symptoms)  Outcome: Progressing  Intervention: Manage Behavior  Recent Flowsheet Documentation  Taken 6/27/2024 1800 by Megan Oconnor RN  De-Escalation Techniques: appropriate behavior reinforced   Goal Outcome Evaluation:    Pt was isolative and withdrawn to her room mostly sleeping in the beginning of the shift. She was observed in the dinner room socializing with staff and playing puzzles.   She paced the hallway intermittently after dinner.  Affect flat but brightened on approach. She stated, \"I have gotten stronger by being here\".  Speech hyper verbal. Pt denied pain and all psych symptoms. Pt had adequate intake and hydrated well. Pt took a shower, hygiene is appropriate. Pt was cooperative, compliant with medications and contracted for safety. No behavioral concerns.   "

## 2024-06-28 ENCOUNTER — MEDICAL CORRESPONDENCE (OUTPATIENT)
Dept: HEALTH INFORMATION MANAGEMENT | Facility: CLINIC | Age: 74
End: 2024-06-28
Payer: MEDICARE

## 2024-06-28 PROCEDURE — 124N000002 HC R&B MH UMMC

## 2024-06-28 PROCEDURE — 250N000013 HC RX MED GY IP 250 OP 250 PS 637

## 2024-06-28 PROCEDURE — H2032 ACTIVITY THERAPY, PER 15 MIN: HCPCS

## 2024-06-28 PROCEDURE — 99231 SBSQ HOSP IP/OBS SF/LOW 25: CPT | Mod: GC | Performed by: PSYCHIATRY & NEUROLOGY

## 2024-06-28 RX ADMIN — OLANZAPINE 10 MG: 10 TABLET, FILM COATED ORAL at 21:12

## 2024-06-28 RX ADMIN — ATORVASTATIN CALCIUM 20 MG: 20 TABLET, FILM COATED ORAL at 09:13

## 2024-06-28 RX ADMIN — LEVOTHYROXINE SODIUM 88 MCG: 88 TABLET ORAL at 09:13

## 2024-06-28 RX ADMIN — THIAMINE HCL TAB 100 MG 100 MG: 100 TAB at 09:13

## 2024-06-28 RX ADMIN — Medication 1 CAPSULE: at 09:13

## 2024-06-28 RX ADMIN — FOLIC ACID 1 MG: 1 TABLET ORAL at 09:13

## 2024-06-28 ASSESSMENT — ACTIVITIES OF DAILY LIVING (ADL)
ADLS_ACUITY_SCORE: 48
ADLS_ACUITY_SCORE: 48
DRESS: INDEPENDENT
ADLS_ACUITY_SCORE: 48
HYGIENE/GROOMING: INDEPENDENT
LAUNDRY: WITH SUPERVISION
ADLS_ACUITY_SCORE: 48
ORAL_HYGIENE: INDEPENDENT
ADLS_ACUITY_SCORE: 48

## 2024-06-28 NOTE — PLAN OF CARE
"Goal Outcome Evaluation:    Problem: Depression  Goal: Improved Mood  Outcome: Progressing     Pt presents calm, cooperative, and irritable. Pt denies SI/HI/SIB, hallucinations, and depression but endorses \"a little\" anxiety. Pt split time between room and milieu but reports the milieu is \"very overstimulating\" which she dislikes. Pt speech is tangential with flight of ideas and is hyper verbal.     Pt does not seem to be insightful as to why she is here and is feels her son and others are forcing her to be here even though she doesn't need to be here. Pt explained to writer multiple times that it is unlawful to keep her here past 72 hours and wants to be represented by her own , but does not want to talk to her  because she \"refuses to be involved in any of this\". Pt reports that she \"WILL be leaving\" by tomorrow and not wait for her pre-petition screening. Writer explained multiple times that unfortunately she will not be able to discharge prior to that meeting.    Pt does not report concerns with bowel/bladder. Pt denies pain. Pt denies need for PRN medication.  VSS, medication compliant, behaviorally in control throughout shift.      "

## 2024-06-28 NOTE — PROGRESS NOTES
"  ----------------------------------------------------------------------------------------------------------  [facility]  Psychiatry Progress Note  Hospital Day #3     Interim History:     The patient's care was discussed with the treatment team and chart notes were reviewed.    Vitals: Temp: 97.6  F (36.4  C) Temp src: Oral BP: (!) 154/73 Pulse: 66     SpO2: 100 %   Sleep: 7 hours (06/28/24 0611)  Scheduled medications: Took all scheduled medications as prescribed   Psychiatric PRN medications:   No psychiatric prns given    Staff Report:   In summary, pt was visible in the milieu interacting with select peers and engaging in activities including use of the exercise bike and puzzles. Pt participated well in an OT group. Pt took a shower and had adequate oral intake.      Subjective:     Patient Interview:  Maribell Mendoza was seen by the team in her room. Pt said she is feeling, \"much better,\" especially because she is wearing \"real clothing.\" Pt says that the [facility] is her place of peace. Pt said that she found out that she can't have caffeinated coffee unless she comes out at a specific time and would like to be woken up at 7:30am. Pt said she woke up around 3am d/t staff voices in the hallway and thought it might be morning. Pt said that she figured [place]. Pt denies feeling sedated.   Pt says that she is having difficulty obtaining medications from her pharmacy. Pt says she has never been on a medication for high blood pressure. Pt says her father and half brother both had HTN. Pt said when she was 14 she found out about her parents previous marriages and this was very distressing to her. Pt said when she was young she bit a thermometer with mercury in it and it went \"all over my mouth.\" Pt does not want a medication for HTN d/t a \"leaky heart valve.\" Pt says she appreciates the team opening the conversation. Pt " "endorses some abdominal pain, but denies having more constipation than normal.    Pt denies SI/HI/AVH. Pt said that the only thing that is bothering her is that new patients don't \"get with the program\" right away.     ROS:  - No acute concerns     Objective:     Vitals:  BP (!) 154/73 (Patient Position: Sitting)   Pulse 66   Temp 97.6  F (36.4  C) (Oral)   Resp 18   Wt 73.8 kg (162 lb 12.8 oz)   SpO2 100%   BMI 28.84 kg/m      Allergies:  Allergies   Allergen Reactions    Alendronate Other (See Comments)     PN:  SWALLOWING    Sulfa Antibiotics Hives     PN: HIVES       Current Medications:  Scheduled:  Current Facility-Administered Medications   Medication Dose Route Frequency Provider Last Rate Last Admin    acetaminophen (TYLENOL) tablet 650 mg  650 mg Oral Q4H PRN Dagher, Ramez, MD        alum & mag hydroxide-simethicone (MAALOX) suspension 30 mL  30 mL Oral Q4H PRN Dagher, Ramez, MD        atorvastatin (LIPITOR) tablet 20 mg  20 mg Oral Daily Dagher, Ramez, MD   20 mg at 06/27/24 0852    folic acid (FOLVITE) tablet 1 mg  1 mg Oral Daily Dagher, Ramez, MD   1 mg at 06/27/24 0852    levothyroxine (SYNTHROID/LEVOTHROID) tablet 88 mcg  88 mcg Oral Daily Dagher, Ramez, MD   88 mcg at 06/27/24 0851    melatonin tablet 3 mg  3 mg Oral At Bedtime PRN Dagher, Ramez, MD        multivitamin  with lutein (OCUVITE WITH LUTEIN) per capsule 1 capsule  1 capsule Oral Daily Dagher, Ramez, MD   1 capsule at 06/27/24 0852    OLANZapine (zyPREXA) tablet 2.5 mg  2.5 mg Oral TID PRN Dagher, Ramez, MD        Or    OLANZapine (zyPREXA) injection 2.5 mg  2.5 mg Intramuscular TID PRN Dagher, Ramez, MD        OLANZapine (zyPREXA) tablet 10 mg  10 mg Oral At Bedtime Rudy Hung MD   10 mg at 06/27/24 2153    polyethylene glycol (MIRALAX) Packet 17 g  17 g Oral Daily PRN Dagher, Ramez, MD        thiamine (B-1) tablet 100 mg  100 mg Oral Daily Dagher, Ramez, MD   100 mg at 06/27/24 0851       PRN:  Current Facility-Administered " Medications   Medication Dose Route Frequency Provider Last Rate Last Admin    acetaminophen (TYLENOL) tablet 650 mg  650 mg Oral Q4H PRN Dagher, Ramez, MD        alum & mag hydroxide-simethicone (MAALOX) suspension 30 mL  30 mL Oral Q4H PRN Dagher, Ramez, MD        atorvastatin (LIPITOR) tablet 20 mg  20 mg Oral Daily Dagher, Ramez, MD   20 mg at 06/27/24 0852    folic acid (FOLVITE) tablet 1 mg  1 mg Oral Daily Dagher, Ramez, MD   1 mg at 06/27/24 0852    levothyroxine (SYNTHROID/LEVOTHROID) tablet 88 mcg  88 mcg Oral Daily Dagher, Ramez, MD   88 mcg at 06/27/24 0851    melatonin tablet 3 mg  3 mg Oral At Bedtime PRN Dagher, Ramez, MD        multivitamin  with lutein (OCUVITE WITH LUTEIN) per capsule 1 capsule  1 capsule Oral Daily Dagher, Ramez, MD   1 capsule at 06/27/24 0852    OLANZapine (zyPREXA) tablet 2.5 mg  2.5 mg Oral TID PRN Dagher, Ramez, MD        Or    OLANZapine (zyPREXA) injection 2.5 mg  2.5 mg Intramuscular TID PRN Dagher, Ramez, MD        OLANZapine (zyPREXA) tablet 10 mg  10 mg Oral At Bedtime Abhilash, MD Rudy   10 mg at 06/27/24 2153    polyethylene glycol (MIRALAX) Packet 17 g  17 g Oral Daily PRN Dagher, Ramez, MD        thiamine (B-1) tablet 100 mg  100 mg Oral Daily Dagher, Ramez, MD   100 mg at 06/27/24 0851       Labs and Imaging:  New results:   No new results over past 24 hours    Data this admission:  - CBC unremarkable  - BMP unremarkable  - TSH normal  - UDS negative   - Hgb   - Vit B12 1,707 (6/20/2024)  - Folate >40 (6/20/2024)  - Urinalysis unremarkable  - EKG (6/13/2024) Sinus rhythm; Cannot rule out Inferior infarct, age undetermined;   Cannot rule out Anterior infarct, age undetermined; Abnormal ECG. QTc 435ms     Mental Status Exam:     Oriented to:  Grossly Oriented  General:  Awake and Alert  Appearance:  appears stated age, Grooming is adequate, and Dressed in personal clothing  Behavior/Attitude:  Cooperative, Engaged, Easy to redirect, and Open  Eye Contact:  "Appropriate  Psychomotor: No evidence of tics, dystonia, or tardive dyskinesia  and gesticulations no catatonia present  Speech:  appropriate volume/tone, talkative, and expansive  Language: Fluent in English with appropriate syntax and vocabulary.  Mood:  \"much better\"  Affect:  broad/full and irritable  Thought Process:  looseness of association and circumstantial  Thought Content:   No SI/HI/AH/VH, does not appear to be responding to internal stimuli, and delusions; delusions of paranoia  Associations:  questionable  Insight:  impaired due to thinking she is at her baseline   Judgment:  impaired due to believing she does not require care for mental illness   Impulse control: limited  Attention Span:  adequate for conversation  Concentration:  grossly intact  Recent and Remote Memory:  grossly intact, recalls details from last hospitalization  Fund of Knowledge: average  Muscle Strength and Tone: normal  Gait and Station: Normal     Psychiatric Assessment     Maribell Mendoza is a 74 year old female with possible previous psychiatric diagnosis of bipolar I disorder (1970s) who presented via EMS with psychosis in the context of recent travel. Most recent psychiatric hospitalization was in the 1970s, and had a hospitalization for medical rule out of current symptoms 6/13/2024-6/20. Significant symptoms on admission included paranoid delusions and verbal aggression. The MSE on admission was pertinent for labile affect, inattentive concentration, paranoia, and tangential thought process with flight of ideas. Biological contributions to mental health presentation include a possible past diagnosis of bipolar I disorder, and possible misuse of alcohol for insomnia. Psychological contributions to mental health presentation include lack of insight. Social factors contributing to mental health presentation include the pt living alone for past 30 years following divorce from spouse. Protective factors include supportive " family and participation in Christian community.      In summary, the patient's reported symptoms of decreased need for sleep, paranoid delusions, and pressured speech of acute onset are consistent with emmy with psychotic features. Given the presentation and past diagnosis of bipolar I disorder, the most likely diagnosis is decompensation of bipolar I disorder. She is benefitting from pharmacologic stabilization and establishment of longitudinal psychiatric care this admission.        Psychiatric Plan by Diagnosis      Today's changes:  - Continue with current care plan.     # primary psychosis vs encephalopathy unspecified   1. Medications:  - Continue PTA Olanzapine (7.5mg) -> Increased to 10mg (06/27/2024)  - PTA thiamine      2. Pertinent Labs/Monitoring:   - CBC WNL on 06/22  - Urinalysis WNL on 06/22.  - BMP WNL on 06/20  - MRI Brain with no acute abnormalities on 06/18  - Paraneoplastic panel pending  - Chest XR 6/19 showed no acute abnormalities   - EKG last on 6/13 showed  NSR  - B12 1,707 6/20 and folate elevated at >40.0 6/20  - Ammonia on 6/13 was wnl     3. Additional Plans:  - Patient will be treated in therapeutic milieu with appropriate individual and group therapies as described     Psychiatric Hospital Course:      Mariebll Mendoza was admitted to [facility] as a voluntary patient  Medications:  PTA Olanzapine (7.5mg) was continued as well as pt's PTA nonpsychiatric medications   Olanzapine HS PO increased to 10mg (06/27/2024).  No PTA meds were held.   New medications started at the time of admission include none.      The risks, benefits, alternatives, and side effects were discussed and understood by the patient and other caregivers.     Medical Assessment and Plan     Medical diagnoses to be addressed this admission:    #Hyperlipidemia  - Continue atorvastatin 20mg     #Osteoporosis  - 400-400mg  Glucosamine-chondroitin -> discontinued on 6/26/2024     #Hyperthoidism with Hashimoto's  Thyroiditis  - Continue Levothroxine 88mcg     # Medical work up for psychosis  Medical admission on 06/13-06/20. Originally presented mild CMP changes: with elevated BUN 27.3, Cr 1.01, and anion gap of 17. Total michel slightly elevated at 1.3 as well as lipase of 63.  UA showed some ketones. BMP showed intermittent improvement and worsening over that hospital stay.   - Per neuro consult, recommended autoimmune work up, received a Paraneoplastic panel which was unremarkable, and a Brain MRI which showed no acute intercranial abnormality      Medical course: Patient was physically examined by the ED prior to being transferred to the unit and was found to be medically stable and appropriate for admission.      Consults:  none     Checklist     Legal Status: Orders Placed This Encounter      Legal status Court Hold        Safety Assessment:   Behavioral Orders   Procedures    Code 1 - Restrict to Unit    Routine Programming     As clinically indicated    Status 15     Every 15 minutes.       Risk Assessment:  Risk for harm is moderate-high.  Risk factors: family dynamics and impulsive  Protective factors: family     SIO: Not indicated at this time    Disposition: TBD. Disposition pending clinical stabilization, medication optimization and development of an appropriate discharge plan      Attestations     Antonio Lees, MS3  [facility] Medical Student      I was present with the medical student who participated in the service and in the documentation of the note.  I have verified the history and personally performed the physical exam and medical decision making. I agree with the assessment and plan of care as documented in the note.    This patient was seen and discussed with my attending physician.    Rudy Hugn MD  Psychiatry Resident Physician    Attestation:  This patient has been seen and evaluated by me, Kizzy Hill MD.  I have discussed this patient with the house staff team including the resident and medical  student and I agree with the findings and plan in this note.    I have reviewed today's vital signs, medications, labs and imaging. Kizzy Hill MD , PhD.

## 2024-06-28 NOTE — PLAN OF CARE
Team Note Due:  Tuesday    Assessment/Intervention/Current Symtoms and Care Coordination:  Chart review and met with team, discussed pt progress, symptomology, and response to treatment.  Discussed the discharge plan and any potential impediments to discharge.    Received email from [employer] with Treating Physician's Recommendation for MD to complete. MD completed, returned to [employer]. Copy of document placed in pt's chart.    MD completed [place] forms. Sent to [employer] to include with petition.     Discharge Plan or Goal:  Discharge to home with OP supports pending stabilization of symptoms and medication management     Barriers to Discharge:  Patient requires further psychiatric stabilization due to current symptomology, medication management with changes subject to provider, coordination with outside supports, and aftercare planning. Pt is also currently involved in the commitment process.     Referral Status:  None at this time     Legal Status:  Court hold  [facility]: [place]  File Number: [id number]  Start and expiration date of commitment: TBD    Upcoming Hearings:  Preliminary/Probable Cause Hearing on 07/01/2024 at 9:00 AM  Commitment Hearing on 07/03/2024 at 9:00 AM    Tricia Brothers ([employer]PS):  [phone] (office)  [phone] (cell)    Contacts:  Tom Mendoza (son): [phone]       Upcoming Meetings and Dates/Important Information and next steps:  Follow commitment process  Discuss resuming [facility] with pt's son when closer to discharge

## 2024-06-28 NOTE — PLAN OF CARE
Goal Outcome Evaluation:    Rehab Group    Start time: 1115  End time: 1415  Patient time total: 120 minutes    attended full group    #6 attended   Group Type: art   Group Topic Covered:  Art Therapy       Group Session Detail:  Art Therapy directive was to create a vision board collage with the goals of: identifying personal strengths and goals, creating a personal self narrative, emotional expression.        Patient Response/Contribution:  cooperative with task       Patient Detail:    Pt was an engaged participant, focused on task for the full duration of group. Pt finished vision board and briefly verbally processed with author and group. Pt found images related to pts safe place ([place]), family, favorite hobbies and pets. Pts mood was calm, further assessment is needed.        Group Therapy ([id number])    Patient Active Problem List   Diagnosis    Chronic pain of left knee    Psychosis (H)    Acute encephalopathy    Paranoia (H)    Disorganized behavior    Psychosis, unspecified psychosis type (H)    Psychotic episode (H)

## 2024-06-28 NOTE — PLAN OF CARE
BEH IP Unit Acuity Rating Score (UARS)  Patient is given one point for every criteria they meet.    CRITERIA SCORING   On a 72 hour hold, court hold, committed, stay of commitment, or revocation. 1    Patient LOS on BEH unit exceeds 20 days. 0  LOS: 3   Patient under guardianship, 55+, otherwise medically complex, or under age 11. 1   Suicide ideation without relief of precipitating factors. 0   Current plan for suicide. 0   Current plan for homicide. 0   Imminent risk or actual attempt to seriously harm another without relief of factors precipitating the attempt. 0   Severe dysfunction in daily living (ex: complete neglect for self care, extreme disruption in vegetative function, extreme deterioration in social interactions). 1   Recent (last 7 days) or current physical aggression in the ED or on unit. 0   Restraints or seclusion episode in past 72 hours. 0   Recent (last 7 days) or current verbal aggression, agitation, yelling, etc., while in the ED or unit. 0   Active psychosis. 1   Need for constant or near constant redirection (from leaving, from others, etc).  0   Intrusive or disruptive behaviors. 0   Patient requires 3 or more hours of individualized nursing care per 8-hour shift (i.e. for ADLs, meds, therapeutic interventions). 0   TOTAL 4

## 2024-06-29 PROCEDURE — 250N000013 HC RX MED GY IP 250 OP 250 PS 637

## 2024-06-29 PROCEDURE — 124N000002 HC R&B MH UMMC

## 2024-06-29 RX ADMIN — LEVOTHYROXINE SODIUM 88 MCG: 88 TABLET ORAL at 08:19

## 2024-06-29 RX ADMIN — Medication 1 CAPSULE: at 08:20

## 2024-06-29 RX ADMIN — FOLIC ACID 1 MG: 1 TABLET ORAL at 08:19

## 2024-06-29 RX ADMIN — THIAMINE HCL TAB 100 MG 100 MG: 100 TAB at 08:19

## 2024-06-29 RX ADMIN — ATORVASTATIN CALCIUM 20 MG: 20 TABLET, FILM COATED ORAL at 08:19

## 2024-06-29 RX ADMIN — OLANZAPINE 10 MG: 10 TABLET, FILM COATED ORAL at 21:57

## 2024-06-29 ASSESSMENT — ACTIVITIES OF DAILY LIVING (ADL)
ADLS_ACUITY_SCORE: 48
DRESS: INDEPENDENT
HYGIENE/GROOMING: INDEPENDENT
DRESS: INDEPENDENT
ADLS_ACUITY_SCORE: 48
ORAL_HYGIENE: INDEPENDENT
ADLS_ACUITY_SCORE: 48
HYGIENE/GROOMING: INDEPENDENT
ADLS_ACUITY_SCORE: 48
ORAL_HYGIENE: INDEPENDENT
ADLS_ACUITY_SCORE: 48

## 2024-06-29 NOTE — PLAN OF CARE
Rehab Group    Start time: 1315  End time: 1400  Patient time total: 10 minutes    came in and out of group session    #2 attended   Group Type: occupational therapy   Group Topic Covered: cognitive activities, coping skills, healthy leisure time, problem solving, and self-care   Group Session Detail:OT Cognitive Wellness group   Patient Response/Contribution:  requested more information on topic   Patient Detail:pt flat. Pt appropriate during interactions with therapist. Pt in and out of group on a few occasions. Pt was provided with instructions and demonstration for cognitive task. Pt declined participation in structured activity due to visual deficits. Pt requested staff open OT clinic cabinets to which therapist declined. Pt was provided with additional cognitive task choices. Pt declined. Pt then left group space and did not return.        No Charge    Patient Active Problem List   Diagnosis    Chronic pain of left knee    Psychosis (H)    Acute encephalopathy    Paranoia (H)    Disorganized behavior    Psychosis, unspecified psychosis type (H)    Psychotic episode (H)

## 2024-06-29 NOTE — PLAN OF CARE
Problem: Psychotic Signs/Symptoms  Goal: Improved Behavioral Control (Psychotic Signs/Symptoms)  Outcome: Progressing   Goal Outcome Evaluation:    Plan of Care Reviewed With: patient      Pt denied all psych symptoms. She was upset earlier during this shift that she had to wait for her court paper that was not available earlier during the day. Staff spent some time with pt to encourage her. Court paper has been given to patient. Pt's son (Tom Mendoza) called for an update about current status of his mom. He said that pt called him, and said that she is ready to leave, and that she will be taking her medications at home. Staff left his number on team's sticky note. Pt has signed an WAQAS for her son. Pt later took a shower. She was calm, and pleasant for the rest of the evening. She took her med without problems. She was interactive with peers.

## 2024-06-29 NOTE — PLAN OF CARE
"Pt denies,   Pt hyper verbal.  Pt talked on length about her vitamin supplements and where she gets them from and who orders.  Pt talked about her  leaving her for another man.  Pt talked about her son who works for [employer] and lives in [place].  Pt wants son to come back to [place].  Pt talks a lot to one particular other patient.  Problem: Adult Behavioral Health Plan of Care  Goal: Plan of Care Review  Outcome: Not Progressing  Goal: Patient-Specific Goal (Individualization)  Description: You can add care plan individualizations to a care plan. Examples of Individualization might be:  \"Parent requests to be called daily at 9am for status\", \"I have a hard time hearing out of my right ear\", or \"Do not touch me to wake me up as it startles  me\".  Outcome: Not Progressing  Goal: Adheres to Safety Considerations for Self and Others  Outcome: Not Progressing  Intervention: Develop and Maintain Individualized Safety Plan  Recent Flowsheet Documentation  Taken 6/29/2024 1500 by Arabella Bolden RN  Safety Measures: safety rounds completed  Goal: Absence of New-Onset Illness or Injury  Outcome: Not Progressing  Intervention: Identify and Manage Fall Risk  Recent Flowsheet Documentation  Taken 6/29/2024 1500 by Arabella Bolden RN  Safety Measures: safety rounds completed  Goal: Optimized Coping Skills in Response to Life Stressors  Outcome: Not Progressing  Goal: Develops/Participates in Therapeutic [place] to Support Successful Transition  Outcome: Not Progressing   Goal Outcome Evaluation:                        "

## 2024-06-30 PROCEDURE — 250N000013 HC RX MED GY IP 250 OP 250 PS 637

## 2024-06-30 PROCEDURE — 124N000002 HC R&B MH UMMC

## 2024-06-30 RX ADMIN — LEVOTHYROXINE SODIUM 88 MCG: 88 TABLET ORAL at 08:50

## 2024-06-30 RX ADMIN — OLANZAPINE 10 MG: 10 TABLET, FILM COATED ORAL at 21:51

## 2024-06-30 RX ADMIN — FOLIC ACID 1 MG: 1 TABLET ORAL at 08:50

## 2024-06-30 RX ADMIN — ATORVASTATIN CALCIUM 20 MG: 20 TABLET, FILM COATED ORAL at 08:50

## 2024-06-30 RX ADMIN — Medication 1 CAPSULE: at 08:50

## 2024-06-30 RX ADMIN — THIAMINE HCL TAB 100 MG 100 MG: 100 TAB at 08:50

## 2024-06-30 ASSESSMENT — ACTIVITIES OF DAILY LIVING (ADL)
ADLS_ACUITY_SCORE: 48
ADLS_ACUITY_SCORE: 48
LAUNDRY: WITH SUPERVISION
ADLS_ACUITY_SCORE: 48
ORAL_HYGIENE: INDEPENDENT
ADLS_ACUITY_SCORE: 48
DRESS: INDEPENDENT
ADLS_ACUITY_SCORE: 48
ORAL_HYGIENE: INDEPENDENT
ADLS_ACUITY_SCORE: 48
HYGIENE/GROOMING: INDEPENDENT;SHOWER
ADLS_ACUITY_SCORE: 48
HYGIENE/GROOMING: INDEPENDENT
ADLS_ACUITY_SCORE: 48
ADLS_ACUITY_SCORE: 48
DRESS: INDEPENDENT
ADLS_ACUITY_SCORE: 48

## 2024-06-30 NOTE — PLAN OF CARE
"Pt up this am took a shower and ate breakfast. Pt.  Pt napped before lunch. Pt came out ate lunch in dinner room.  Pt very talkative with another patient. Pt when starts talking talk for a long time. Pt talked to this staff about how her  left her for a man, and how many years they were  and when they .  Pt said this to this staff yesterday.  Pt talked and talked about her recent trip and her ex  wanted her to take one of his bags.  But she allowed 2 free bags ans wasn't going to take his.  Pt states he got upset with her.  Pt went on and on with this story.    Problem: Adult Behavioral Health Plan of Care  Goal: Plan of Care Review  Outcome: Not Progressing  Goal: Patient-Specific Goal (Individualization)  Description: You can add care plan individualizations to a care plan. Examples of Individualization might be:  \"Parent requests to be called daily at 9am for status\", \"I have a hard time hearing out of my right ear\", or \"Do not touch me to wake me up as it startles  me\".  Outcome: Not Progressing  Goal: Adheres to Safety Considerations for Self and Others  Outcome: Not Progressing  Intervention: Develop and Maintain Individualized Safety Plan  Recent Flowsheet Documentation  Taken 6/30/2024 1000 by Arabella Bolden RN  Safety Measures: safety rounds completed  Goal: Absence of New-Onset Illness or Injury  Outcome: Not Progressing  Intervention: Identify and Manage Fall Risk  Recent Flowsheet Documentation  Taken 6/30/2024 1000 by Arabella Bolden RN  Safety Measures: safety rounds completed  Goal: Optimized Coping Skills in Response to Life Stressors  Outcome: Not Progressing  Goal: Develops/Participates in Therapeutic [place] to Support Successful Transition  Outcome: Not Progressing   Goal Outcome Evaluation:                        "

## 2024-06-30 NOTE — PLAN OF CARE
"Pt denies SI.  Pt talks a lot.  Asked pt what brought her here. Pt long story of airport and [place] and how she had gotten good news.  Pt states yes she thought she was manic.  Pt states she feels more stable now.  Pt very interactive with another patient on the unit. Pt has been medication compliant. Pt compliant with having vitals completed.  Pt has been pleasant and cooperative.    Problem: Adult Behavioral Health Plan of Care  Goal: Plan of Care Review  6/29/2024 2208 by Arabella Bolden RN  Outcome: Not Progressing  6/29/2024 1518 by Arabella Bolden RN  Outcome: Not Progressing  Goal: Patient-Specific Goal (Individualization)  Description: You can add care plan individualizations to a care plan. Examples of Individualization might be:  \"Parent requests to be called daily at 9am for status\", \"I have a hard time hearing out of my right ear\", or \"Do not touch me to wake me up as it startles  me\".  6/29/2024 2208 by Arabella Bolden RN  Outcome: Not Progressing  6/29/2024 1518 by Arabella Bolden RN  Outcome: Not Progressing  Goal: Adheres to Safety Considerations for Self and Others  6/29/2024 2208 by Arabella Bolden RN  Outcome: Not Progressing  6/29/2024 1518 by Arabella Bolden RN  Outcome: Not Progressing  Intervention: Develop and Maintain Individualized Safety Plan  Recent Flowsheet Documentation  Taken 6/29/2024 2200 by Arabella Bolden RN  Safety Measures: safety rounds completed  Taken 6/29/2024 1500 by Arabella Bolden RN  Safety Measures: safety rounds completed  Goal: Absence of New-Onset Illness or Injury  6/29/2024 2208 by Arabella Bolden RN  Outcome: Not Progressing  6/29/2024 1518 by Arabella Bolden RN  Outcome: Not Progressing  Intervention: Identify and Manage Fall Risk  Recent Flowsheet Documentation  Taken 6/29/2024 2200 by Arabella Bolden RN  Safety Measures: safety rounds completed  Taken 6/29/2024 1500 by Arabella Bolden RN  Safety Measures: safety rounds completed  Goal: Optimized Coping Skills in Response " to Life Stressors  6/29/2024 2208 by Arabella Bolden RN  Outcome: Not Progressing  6/29/2024 1518 by Arabella Bolden RN  Outcome: Not Progressing  Goal: Develops/Participates in Therapeutic [place] to Support Successful Transition  6/29/2024 2208 by Arabella Bolden RN  Outcome: Not Progressing  6/29/2024 1518 by Arabella Bolden RN  Outcome: Not Progressing   Goal Outcome Evaluation:

## 2024-06-30 NOTE — PLAN OF CARE
No PRNs given or requested this shift. Pt remained in her room the entire shift. Safety checks completed every 15 minutes; no concerns noted. Pt appears to have slept for 6.75 hours; will continue to monitor and offer support.     Problem: Sleep Disturbance  Goal: Adequate Sleep/Rest  Outcome: Progressing   Goal Outcome Evaluation:

## 2024-06-30 NOTE — PLAN OF CARE
"Goal Outcome Evaluation:    Plan of Care Reviewed With: patient      Problem: Psychotic Signs/Symptoms  Goal: Optimal Cognitive Function (Psychotic Signs/Symptoms)  Outcome: Progressing     Patient appears bright and pleasant. Patient visible on the unit and intermittently pacing on the hallway. Patient could be seen socially interacting with peers. Patient described her mood as \"Extraordinarily mellow.\" Said that she had 2 long naps today and that she feels good about it. She denied anxiety but depression was 0-1. Patient denied SI/HI and hallucinations. She denied pain also. Patient talked about having headache earlier from not wearing her glasses for a long time. Speech is clear. Patient demonstrates organized thought process. She was compliant with her night medications. She went to bed at 2200.               "

## 2024-07-01 PROCEDURE — 250N000013 HC RX MED GY IP 250 OP 250 PS 637

## 2024-07-01 PROCEDURE — 90832 PSYTX W PT 30 MINUTES: CPT

## 2024-07-01 PROCEDURE — 99231 SBSQ HOSP IP/OBS SF/LOW 25: CPT | Mod: GC | Performed by: PSYCHIATRY & NEUROLOGY

## 2024-07-01 PROCEDURE — 124N000002 HC R&B MH UMMC

## 2024-07-01 RX ADMIN — ATORVASTATIN CALCIUM 20 MG: 20 TABLET, FILM COATED ORAL at 07:54

## 2024-07-01 RX ADMIN — Medication 1 CAPSULE: at 07:53

## 2024-07-01 RX ADMIN — LEVOTHYROXINE SODIUM 88 MCG: 88 TABLET ORAL at 07:54

## 2024-07-01 RX ADMIN — FOLIC ACID 1 MG: 1 TABLET ORAL at 07:54

## 2024-07-01 RX ADMIN — THIAMINE HCL TAB 100 MG 100 MG: 100 TAB at 07:54

## 2024-07-01 RX ADMIN — OLANZAPINE 10 MG: 10 TABLET, FILM COATED ORAL at 21:19

## 2024-07-01 ASSESSMENT — ACTIVITIES OF DAILY LIVING (ADL)
ADLS_ACUITY_SCORE: 48
HYGIENE/GROOMING: INDEPENDENT
ADLS_ACUITY_SCORE: 48
LAUNDRY: WITH SUPERVISION
ADLS_ACUITY_SCORE: 48
ORAL_HYGIENE: INDEPENDENT
DRESS: SCRUBS (BEHAVIORAL HEALTH);INDEPENDENT
ADLS_ACUITY_SCORE: 48

## 2024-07-01 NOTE — PLAN OF CARE
IP Treatment Plan    Client's Name: Maribell Mendoza  YOB: 1950      Treatment Plan Date: July 1, 2024      Anticipated number of sessions or this episode of care: 2    Current Concerns/Problem Areas:     Goal 1 :Patient will identify and gain insight into triggers and warning signs to psychosis and identify ways to cope with those      Intervention(s)  Engaged in safety planning identifying coping skills, warning signs, health support and resources, Understand and identify reasons for hospitalization, how to use the hospital to create change and prevent future hospitalizations , and Reviewed healthy living that supports positive mental health, including looking at sleep hygiene, regular movement, nutrition, and regular socialization                 Pt centered considerations  Spiritual/Denominational considerations: [employer]

## 2024-07-01 NOTE — PLAN OF CARE

## 2024-07-01 NOTE — PLAN OF CARE
Team Note Due:  Tuesday    Assessment/Intervention/Current Symtoms and Care Coordination:  Chart review and met with team, discussed pt progress, symptomology, and response to treatment.  Discussed the discharge plan and any potential impediments to discharge.    Received confirmation the [facility] forms were filed and the hearing will take place at final hearing on 7/3.    Received call from pt's son. He shared he's seen positive improvement since pt was initially hospitalized. Writer shared the team also seen improvement and the team has considered referring pt to [facility] if she's agreeable in addition to having psychiatry set up. Antonio shared his support for this plan and confirmed [facility] is able to resume services as soon as pt discharges. We agreed to reconnect within the next 1-2 days to discuss updates on discharge planning.    Met with pt to remind her of her hearing this morning. She denied having any questions. Pt attended 11:15am hearing.    Request for psychiatry appt made through care coordinators.    Met with pt this afternoon to discuss her court hearing this morning and discharge plans. Pt states she's not driving but if transportation could be figured out, she'd be open to the [facility] at [place]. She reported feeling positive about her progress and feels safe to discharge home when appropriate.    Discharge Plan or Goal:  Discharge to home with OP supports pending stabilization of symptoms and medication management. Consider referral to [facility] when appropriate.     Barriers to Discharge:  Patient requires further psychiatric stabilization due to current symptomology, medication management with changes subject to provider, coordination with outside supports, and aftercare planning. Pt is also currently involved in the commitment process.     Referral Status:  None at this time     Legal Status:  Court hold  [facility]: [place]  File Number: [id number]  Start and expiration date of commitment:  TBD    Upcoming Hearings:  Commitment Hearing on 07/03/2024 at 9:00 AM    Tricia Brothers (PPS):  [phone] (office)  [phone] (cell)    Contacts:  Tom Mendoza (son): [phone]       Upcoming Meetings and Dates/Important Information and next steps:  Follow commitment process  Discuss resuming [facility] with pt's son when closer to discharge

## 2024-07-01 NOTE — PLAN OF CARE
Care Coordinator Note(s):    Care Request(s):   Psychiatry   Preferences: Time Frame: 3 Weeks, In Person  Notes: Pt in need of new med mgmt provider in [facility] [place]. Plan for discharge later this week      Care Outcome(s):    CC Progress Note(s)/ Documentation:     [facility] [place]  IP for mental health dx in last 2 years?  Yes  Current ? No  Previous medication management provider? No  Attended or been referred to chemical/ substance use program? No       Appointment: Psychiatry   Date/time: Monday July 22nd, 2024 @ 10:00 AM In person  Provider:  Dary ZHANG  Address: [address] [place] [address]  Phone: [phone]  Fax: [phone]   Note:   This is a 75 minute appointment. Intake paperwork to be completed beforehand will be sent to [email or web address]. Please arrive 30 minutes early if you prefer to complete paperwork in person.   Follow up: Monday August 26th, 2024 @ 10:00 AM In person        -Edith Price  Adult Behavioral Health Care Coordinator

## 2024-07-01 NOTE — PLAN OF CARE
No PRNs given or requested this shift. Pt remained in her room the entire shift. Safety checks completed every 15 minutes; no concerns noted. Pt appears to have slept for  6.25 hours; will continue to monitor and offer support.     Problem: Sleep Disturbance  Goal: Adequate Sleep/Rest  Outcome: Progressing   Goal Outcome Evaluation:

## 2024-07-01 NOTE — PLAN OF CARE
"Individual Therapy Note      Date of Service: July 1, 2024    Patient: Samantha goes by \"Samantha,\" uses she/her pronouns    Individuals Present: Samantha oRsa Aragon [facility]    Session start: 0925  Session end: 1000  Session duration in minutes: 35      Modality Used: Person Centered, Rapport Building, and Brief Therapy    Goals: Patient will identify and gain insight into triggers and warning signs to psychosis and identify ways to cope with those     Patient Description of current symptoms: \"Doing remarkably well\"      Mental Status Exam:   Attitude: cooperative  Eye Contact: good  Mood: good  Affect: appropriate and in normal range and intensity is blunted  Speech: clear, coherent and rambling  Psychomotor Behavior: no evidence of tardive dyskinesia, dystonia, or tics  Thought Process:  linear and tangental  Associations: no loose associations  Thought Content: no evidence of suicidal ideation or homicidal ideation  Insight: partial  Judgement: fair  Attention Span and Concentration: fair    Pt progress: Pt reports she is doing better, PTA was \"spinning out of control\" - taking on more than she could handle moving to a new home, not on a regular schedule with sleep, eating, or taking medication. Pt practicing staying on schedule during this hospitalization. Pt reports she wants to abstain from alcohol until she has \"everything under control.\" Pt was organized, somewhat hyperverbal, tangential though did acknowledge it. Reports she is only this talkative when she has someone to really listen to her.     Treatment Objective(s) Addressed:   The focus of this session was on rapport building, orienting the patient to therapy, safety planning, and identifying treatment goals     Progress Towards Goals and Assessment of Patient:   Unable to assess progress towards goals - first meeting with Pt.      Therapeutic Intervention(s):   Provided active listening, unconditional positive regard, and validation.   Engaged in safety " planning identifying coping skills, warning signs, health support and resources, Understand and identify reasons for hospitalization, how to use the hospital to create change and prevent future hospitalizations , and Reviewed healthy living that supports positive mental health, including looking at sleep hygiene, regular movement, nutrition, and regular socialization    Plan/next step: Writer will continue to check in with Pt, encouraged Pt to attend group sessions.      90743 - Psychotherapy (with patient) - 30 (16-37*) min    Patient Active Problem List   Diagnosis    Chronic pain of left knee    Psychosis (H)    Acute encephalopathy    Paranoia (H)    Disorganized behavior    Psychosis, unspecified psychosis type (H)    Psychotic episode (H)

## 2024-07-01 NOTE — PLAN OF CARE
"Goal Outcome Evaluation:    Pt came in the group room and announced \" I am just here to look out the window.\" She was standing and looking out the window. Writer told her she was welcome to look out the window, but she would need to take a seat and participate in group and she did not accept that answer, left the group room irritated and did not return.                        "

## 2024-07-01 NOTE — PLAN OF CARE
Problem: Adult Behavioral Health Plan of Care  Goal: Plan of Care Review  Outcome: Progressing  Flowsheets (Taken 7/1/2024 0673)  Plan of Care Reviewed With: patient  Overall Patient Progress: improving  Patient Agreement with Plan of Care: agrees     Problem: Psychotic Signs/Symptoms  Goal: Improved Behavioral Control (Psychotic Signs/Symptoms)  Outcome: Progressing     Problem: Anxiety  Goal: Anxiety Reduction or Resolution  Outcome: Progressing     Problem: Psychotic Signs/Symptoms  Goal: Improved Mood Symptoms (Psychotic Signs/Symptoms)  Outcome: Progressing   Goal Outcome Evaluation:      Plan of Care Reviewed With: patient    Overall Patient Progress: improving  Samantha has been visible in the milieu.Patient likes to take naps from to time.Affect bright.Mood has been calm.Denies anxiety and depression.patient talked with her son today.Sociable with select peers.No escalation of behaviors.No safety concerns.Compliant with medications.No PRN.Denies all other psych symptoms,SI/SIB/AH/VH.Adequate food and fluid intake.Hygiene appropriate.  Temp: 97.6  F (36.4  C) Temp src: Oral BP: (!) 149/85 Pulse: 67     SpO2: 96 % O2 Device: None (Room air)

## 2024-07-01 NOTE — PROGRESS NOTES
"  ----------------------------------------------------------------------------------------------------------  [facility]  Psychiatry Progress Note  Hospital Day #6     Interim History:     The patient's care was discussed with the treatment team and chart notes were reviewed.    Vitals: Temp: 97.6  F (36.4  C) Temp src: Oral BP: (!) 149/85 Pulse: 67     SpO2: 100 %   Sleep: 6.25 hours (07/01/24 0614)  Scheduled medications: Took all scheduled medications as prescribed   Psychiatric PRN medications:   No psychiatric prns given    Staff Report:   In summary, pPatient appears bright and pleasant. Patient visible on the unit and intermittently pacing on the hallway. Patient could be seen socially interacting with peers. Patient described her mood as \"Extraordinarily mellow.\" Said that she had 2 long naps today and that she feels good about it. She denied anxiety but depression was 0-1. Patient denied SI/HI and hallucinations.       Subjective:     Patient Interview:  Maribell Mendoza was seen by the team outside her room.     Pt reports she was worried about today with prelim hearing with [facility]. She feels \"even more like Samantha\" since being able to have a conversation with  vs. the  she was afraid of. Was unable to see her son during the zoom call even though she wished she could.     She reports her sleep has improved and didn't feel as groggy this morning. Feels like she is getting adjusted to the new medicine, olanzapine, well. First few nights over weekend she felt pretty \"sleepy\", needing to nap multiple times throughout the day, but thinks it has gotten better. Only a little nap yesterday.     Denies any thoughts or feelings of hurting herself or others. She reports she is doing her best to get adjusted to a difficult situation. Is looking forward to Wednesday court hearing.       ROS:  - No acute concerns     Objective:     Vitals:  BP (!) 149/85 (BP " Location: Right arm, Patient Position: Sitting, Cuff Size: Adult Regular)   Pulse 67   Temp 97.6  F (36.4  C) (Oral)   Resp 20   Wt 74.5 kg (164 lb 3.2 oz)   SpO2 100%   BMI 29.09 kg/m      Allergies:  Allergies   Allergen Reactions    Alendronate Other (See Comments)     PN:  SWALLOWING    Sulfa Antibiotics Hives     PN: HIVES       Current Medications:  Scheduled:  Current Facility-Administered Medications   Medication Dose Route Frequency Provider Last Rate Last Admin    acetaminophen (TYLENOL) tablet 650 mg  650 mg Oral Q4H PRN Dagher, Ramez, MD        alum & mag hydroxide-simethicone (MAALOX) suspension 30 mL  30 mL Oral Q4H PRN Dagher, Ramez, MD        atorvastatin (LIPITOR) tablet 20 mg  20 mg Oral Daily Dagher, Ramez, MD   20 mg at 06/30/24 0850    folic acid (FOLVITE) tablet 1 mg  1 mg Oral Daily Dagher, Ramez, MD   1 mg at 06/30/24 0850    levothyroxine (SYNTHROID/LEVOTHROID) tablet 88 mcg  88 mcg Oral Daily Dagher, Ramez, MD   88 mcg at 06/30/24 0850    melatonin tablet 3 mg  3 mg Oral At Bedtime PRN Dagher, Ramez, MD        multivitamin  with lutein (OCUVITE WITH LUTEIN) per capsule 1 capsule  1 capsule Oral Daily Dagher, Ramez, MD   1 capsule at 06/30/24 0850    OLANZapine (zyPREXA) tablet 2.5 mg  2.5 mg Oral TID PRN Dagher, Ramez, MD        Or    OLANZapine (zyPREXA) injection 2.5 mg  2.5 mg Intramuscular TID PRN Dagher, Ramez, MD        OLANZapine (zyPREXA) tablet 10 mg  10 mg Oral At Bedtime Rudy Hung MD   10 mg at 06/30/24 2151    polyethylene glycol (MIRALAX) Packet 17 g  17 g Oral Daily PRN Dagher, Ramez, MD        thiamine (B-1) tablet 100 mg  100 mg Oral Daily Dagher, Ramez, MD   100 mg at 06/30/24 0850       PRN:  Current Facility-Administered Medications   Medication Dose Route Frequency Provider Last Rate Last Admin    acetaminophen (TYLENOL) tablet 650 mg  650 mg Oral Q4H PRN Dagher, Ramez, MD        alum & mag hydroxide-simethicone (MAALOX) suspension 30 mL  30 mL Oral Q4H PRN  Dagher, Ramez, MD        atorvastatin (LIPITOR) tablet 20 mg  20 mg Oral Daily Dagher, Ramez, MD   20 mg at 06/30/24 0850    folic acid (FOLVITE) tablet 1 mg  1 mg Oral Daily Dagher, Ramez, MD   1 mg at 06/30/24 0850    levothyroxine (SYNTHROID/LEVOTHROID) tablet 88 mcg  88 mcg Oral Daily Dagher, Ramez, MD   88 mcg at 06/30/24 0850    melatonin tablet 3 mg  3 mg Oral At Bedtime PRN Dagher, Ramez, MD        multivitamin  with lutein (OCUVITE WITH LUTEIN) per capsule 1 capsule  1 capsule Oral Daily Dagher, Ramez, MD   1 capsule at 06/30/24 0850    OLANZapine (zyPREXA) tablet 2.5 mg  2.5 mg Oral TID PRN Dagher, Ramez, MD        Or    OLANZapine (zyPREXA) injection 2.5 mg  2.5 mg Intramuscular TID PRN Dagher, Ramez, MD        OLANZapine (zyPREXA) tablet 10 mg  10 mg Oral At Bedtime Abhilash, MD Rudy   10 mg at 06/30/24 2151    polyethylene glycol (MIRALAX) Packet 17 g  17 g Oral Daily PRN Dagher, Ramez, MD        thiamine (B-1) tablet 100 mg  100 mg Oral Daily Dagher, Ramez, MD   100 mg at 06/30/24 0850       Labs and Imaging:  New results:   No new results over past 24 hours    Data this admission:  - CBC unremarkable  - BMP unremarkable  - TSH normal  - UDS negative   - Hgb   - Vit B12 1,707 (6/20/2024)  - Folate >40 (6/20/2024)  - Urinalysis unremarkable  - EKG (6/13/2024) Sinus rhythm; Cannot rule out Inferior infarct, age undetermined;   Cannot rule out Anterior infarct, age undetermined; Abnormal ECG. QTc 435ms     Mental Status Exam:     Oriented to:  Grossly Oriented  General:  Awake and Alert  Appearance:  appears stated age, Grooming is adequate, and Dressed in personal clothing  Behavior/Attitude:  Cooperative, Engaged, Easy to redirect, and Open  Eye Contact: Appropriate  Psychomotor: No evidence of tics, dystonia, or tardive dyskinesia  and gesticulations no catatonia present  Speech:  appropriate volume/tone, talkative, and expansive  Language: Fluent in English with appropriate syntax and  "vocabulary.  Mood:  \"much better\"  Affect:  broad/full and irritable  Thought Process:  looseness of association and circumstantial  Thought Content:   No SI/HI/AH/VH, does not appear to be responding to internal stimuli, and delusions; delusions of paranoia  Associations:  questionable  Insight:  impaired due to thinking she is at her baseline   Judgment:  impaired due to believing she does not require care for mental illness   Impulse control: limited  Attention Span:  adequate for conversation  Concentration:  grossly intact  Recent and Remote Memory:  grossly intact, recalls details from last hospitalization  Fund of Knowledge: average  Muscle Strength and Tone: normal  Gait and Station: Normal     Psychiatric Assessment     Maribell Mendoza is a 74 year old female with possible previous psychiatric diagnosis of bipolar I disorder (1970s) who presented via EMS with psychosis in the context of recent travel. Most recent psychiatric hospitalization was in the 1970s, and had a hospitalization for medical rule out of current symptoms 6/13/2024-6/20. Significant symptoms on admission included paranoid delusions and verbal aggression. The MSE on admission was pertinent for labile affect, inattentive concentration, paranoia, and tangential thought process with flight of ideas. Biological contributions to mental health presentation include a possible past diagnosis of bipolar I disorder, and possible misuse of alcohol for insomnia. Psychological contributions to mental health presentation include lack of insight. Social factors contributing to mental health presentation include the pt living alone for past 30 years following divorce from spouse. Protective factors include supportive family and participation in [employer]ism community.      In summary, the patient's reported symptoms of acute onset decreased need for sleep, paranoid delusions, and pressured speech are consistent with emmy with psychotic features. Given the " presentation and past diagnosis of bipolar I disorder, the most likely diagnosis is decompensation of bipolar I disorder, mixed episodes.     She is benefitting from pharmacologic stabilization and establishment of longitudinal psychiatric care this admission. She is having some sedating side effects from the increased olanzapine, that is resolving, so will let her continue to adjust to the change.         Psychiatric Plan by Diagnosis      Today's changes:  - Continue with current care plan.     # primary psychosis vs encephalopathy unspecified   1. Medications:  - Continue PTA Olanzapine (7.5mg) -> Increased to 10mg (06/27/2024)  - PTA thiamine      2. Pertinent Labs/Monitoring:   - CBC WNL on 06/22  - Urinalysis WNL on 06/22.  - BMP WNL on 06/20  - MRI Brain with no acute abnormalities on 06/18  - Paraneoplastic panel pending  - Chest XR 6/19 showed no acute abnormalities   - EKG last on 6/13 showed  NSR  - B12 1,707 6/20 and folate elevated at >40.0 6/20  - Ammonia on 6/13 was wnl     3. Additional Plans:  - Patient will be treated in therapeutic milieu with appropriate individual and group therapies as described     Psychiatric Hospital Course:      Maribell Mendoza was admitted to [facility] as a voluntary patient  Medications:  PTA Olanzapine (7.5mg) was continued as well as pt's PTA nonpsychiatric medications   Olanzapine HS PO increased to 10mg (06/27/2024).  No PTA meds were held.   New medications started at the time of admission include none.      The risks, benefits, alternatives, and side effects were discussed and understood by the patient and other caregivers.     Medical Assessment and Plan     Medical diagnoses to be addressed this admission:    #Hyperlipidemia  - Continue atorvastatin 20mg     #Osteoporosis  - 400-400mg  Glucosamine-chondroitin -> discontinued on 6/26/2024     #Hyperthoidism with Hashimoto's Thyroiditis  - Continue Levothroxine 88mcg     # Medical work up for  psychosis  Medical admission on 06/13-06/20. Originally presented mild CMP changes: with elevated BUN 27.3, Cr 1.01, and anion gap of 17. Total michel slightly elevated at 1.3 as well as lipase of 63.  UA showed some ketones. BMP showed intermittent improvement and worsening over that hospital stay.   - Per neuro consult, recommended autoimmune work up, received a Paraneoplastic panel which was unremarkable, and a Brain MRI which showed no acute intercranial abnormality      Medical course: Patient was physically examined by the ED prior to being transferred to the unit and was found to be medically stable and appropriate for admission.      Consults:  none     Checklist     Legal Status: Orders Placed This Encounter      Legal status Court Hold        Safety Assessment:   Behavioral Orders   Procedures    Code 1 - Restrict to Unit    Routine Programming     As clinically indicated    Status 15     Every 15 minutes.       Risk Assessment:  Risk for harm is moderate-high.  Risk factors: family dynamics and impulsive  Protective factors: family     SIO: Not indicated at this time    Disposition: TBD. Disposition pending clinical stabilization, medication optimization and development of an appropriate discharge plan      Attestations     This patient was seen and discussed with my attending physician.    Rudy Hung MD  Psychiatry Resident Physician    Attestation:  This patient has been seen and evaluated by me, Kizzy Hill MD.  I have discussed this patient with the house staff team including the resident and medical student and I agree with the findings and plan in this note.    I have reviewed today's vital signs, medications, labs and imaging. Kizzy Hill MD , PhD.

## 2024-07-01 NOTE — PLAN OF CARE
"  Problem: Psychotic Signs/Symptoms  Goal: Improved Behavioral Control (Psychotic Signs/Symptoms)  Outcome: Progressing  Intervention: Manage Behavior  Recent Flowsheet Documentation  Taken 7/1/2024 1600 by Megan Oconnor RN  De-Escalation Techniques: appropriate behavior reinforced     Problem: Anxiety  Goal: Anxiety Reduction or Resolution  Outcome: Progressing     Problem: Psychotic Signs/Symptoms  Goal: Improved Behavioral Control (Psychotic Signs/Symptoms)  Outcome: Progressing  Intervention: Manage Behavior  Recent Flowsheet Documentation  Taken 7/1/2024 1600 by Megan Oconnor RN  De-Escalation Techniques: appropriate behavior reinforced   Goal Outcome Evaluation:    Pt was visible in the milieu. She was sociable with select peers in the dining room. She paced the hallway intermittently. Affect bright.  Mood is calm. Pt was pleasant upon assessments. Stated, \"the court hearing went well, l was a little anxious this morning before the hearing.\" Pt denies anxiety, depression, Stated, \"l am feeling way better\".Denies SI/HI/SIB/AVH. Pt was cooperative and compliant with medications. Adequate food and fluid Intake. No safety concerns. Hygiene appropriate.  No PRN's. No behavioral concerns noted.  "

## 2024-07-02 ENCOUNTER — MEDICAL CORRESPONDENCE (OUTPATIENT)
Dept: HEALTH INFORMATION MANAGEMENT | Facility: CLINIC | Age: 74
End: 2024-07-02
Payer: MEDICARE

## 2024-07-02 PROCEDURE — 124N000002 HC R&B MH UMMC

## 2024-07-02 PROCEDURE — 250N000013 HC RX MED GY IP 250 OP 250 PS 637

## 2024-07-02 PROCEDURE — 99231 SBSQ HOSP IP/OBS SF/LOW 25: CPT | Mod: GC | Performed by: PSYCHIATRY & NEUROLOGY

## 2024-07-02 PROCEDURE — H2032 ACTIVITY THERAPY, PER 15 MIN: HCPCS | Performed by: PSYCHOLOGIST

## 2024-07-02 RX ORDER — HYDRALAZINE HYDROCHLORIDE 10 MG/1
10 TABLET, FILM COATED ORAL 4 TIMES DAILY PRN
Status: DISCONTINUED | OUTPATIENT
Start: 2024-07-02 | End: 2024-07-05 | Stop reason: HOSPADM

## 2024-07-02 RX ADMIN — OLANZAPINE 10 MG: 10 TABLET, FILM COATED ORAL at 22:10

## 2024-07-02 RX ADMIN — THIAMINE HCL TAB 100 MG 100 MG: 100 TAB at 08:51

## 2024-07-02 RX ADMIN — ATORVASTATIN CALCIUM 20 MG: 20 TABLET, FILM COATED ORAL at 08:51

## 2024-07-02 RX ADMIN — FOLIC ACID 1 MG: 1 TABLET ORAL at 08:51

## 2024-07-02 RX ADMIN — Medication 1 CAPSULE: at 08:50

## 2024-07-02 RX ADMIN — LEVOTHYROXINE SODIUM 88 MCG: 88 TABLET ORAL at 08:51

## 2024-07-02 ASSESSMENT — ACTIVITIES OF DAILY LIVING (ADL)
ADLS_ACUITY_SCORE: 48
ADLS_ACUITY_SCORE: 48
LAUNDRY: WITH SUPERVISION
HYGIENE/GROOMING: INDEPENDENT
ADLS_ACUITY_SCORE: 48
DRESS: INDEPENDENT
ADLS_ACUITY_SCORE: 48
ADLS_ACUITY_SCORE: 48
ORAL_HYGIENE: INDEPENDENT
ADLS_ACUITY_SCORE: 48
ADLS_ACUITY_SCORE: 48
ORAL_HYGIENE: INDEPENDENT
ADLS_ACUITY_SCORE: 48
HYGIENE/GROOMING: INDEPENDENT;SHOWER
ADLS_ACUITY_SCORE: 48
DRESS: SCRUBS (BEHAVIORAL HEALTH);INDEPENDENT
ADLS_ACUITY_SCORE: 48
ADLS_ACUITY_SCORE: 48

## 2024-07-02 NOTE — PLAN OF CARE
Problem: Anxiety  Goal: Anxiety Reduction or Resolution  Outcome: Progressing     Problem: Psychotic Signs/Symptoms  Goal: Improved Behavioral Control (Psychotic Signs/Symptoms)  Outcome: Progressing  Intervention: Manage Behavior  Recent Flowsheet Documentation  Taken 7/2/2024 1600 by Megan Oconnor RN  De-Escalation Techniques: appropriate behavior reinforced   Goal Outcome Evaluation:  Pt was visible in the milieu socializing with peer.Affect bright. Mood calm but anxious about a conversation she had with her peer. Pt B/p at 1600 pm was elevated. Writer called and notify Dr. Hill. He put in a stat consult for Internal medicine. Recheck B/p  it was 169/99. Internal medicine called with new orders for hydralazine(Apresoline) 10 mg  4 X daily as needed but stated  medication should not be given since the pt is not hypertensive and is an isolated incident. Upon assessments pt is  asymptomatic. She denied any physical pain and discomfort. Denied all mental health and psych symptoms. Pt had adequate food and fluid intake. She was compliant with medications and contracted for safety. B behavioral concerns.

## 2024-07-02 NOTE — PLAN OF CARE
Team Note Due:  Tuesday    Assessment/Intervention/Current Symtoms and Care Coordination:  Chart review and met with team, discussed pt progress, symptomology, and response to treatment.  Discussed the discharge plan and any potential impediments to discharge.    Received call from court examiner to discuss progress. He stated he would call the unit to speak with the pt later today as well.    Received Treating MD Recommendation form for final hearing. Given to MD to complete. Returned to PPS.    Discharge Plan or Goal:  Discharge to home with OP supports pending stabilization of symptoms and medication management. Consider referral to 55+ IOP when appropriate.     Barriers to Discharge:  Patient requires further psychiatric stabilization due to current symptomology, medication management with changes subject to provider, coordination with outside supports, and aftercare planning. Pt is also currently involved in the commitment process.     Referral Status:  None at this time     Legal Status:  Court hold  [facility]: [place]  File Number: [id number]  Start and expiration date of commitment: TBD    Upcoming Hearings:  Commitment Hearing on 07/03/2024 at 9:00 AM    Tricia Brothers (PPS):  [phone] (office)  [phone] (cell)    Contacts:  Tom Mendoza (son): [phone]       Upcoming Meetings and Dates/Important Information and next steps:  Follow commitment process  Discuss resuming [facility] with pt's son when closer to discharge

## 2024-07-02 NOTE — PROVIDER NOTIFICATION
06/25/24 1220   Individualization/Patient Specific Goals   Patient Personal Strengths expressive of emotions;family/social support;stable living environment;spiritual/Yarsanism support   Patient Vulnerabilities lacks insight into illness;limited support system   Interprofessional Rounds   Summary New admit. Admitted due to concern for manic episode with psychotic features. No psychiatric history. MD requesting to petition for MI commitment. Pt currently on 72HH   Participants nursing;CTC;psychiatrist;other (see comments)   Behavioral Team Discussion   Participants Dr. Sergio MD; Dr. Dagher, MD; Saray BENTLEY; Thelma NUNEZ [facility]; medical students   Progress Initial assessment   Anticipated length of stay 7-10 days   Continued Stay Criteria/Rationale Symptom stabilization, medication management, care coordination   Medical/Physical See H&P   Precautions See below   Plan Psychiatric assessment/Medication management. Therapeutic Milieu. Individual care planning and after care planning. Patient to participate in unit groups and activities. Individual and group support on unit.   Safety Plan Per unit protocol   Anticipated Discharge Disposition home or self-care     PRECAUTIONS AND SAFETY    Behavioral Orders   Procedures    Code 1 - Restrict to Unit    Routine Programming     As clinically indicated    Status 15     Every 15 minutes.       Safety  Patient Location: lounge, patient room, own  Observed Behavior: calm  Safety Measures: safety rounds completed  De-Escalation Techniques: appropriate behavior reinforced          
   07/02/24 0927   Individualization/Patient Specific Goals   Patient Personal Strengths expressive of emotions;family/social support;stable living environment;spiritual/[place] support   Patient Vulnerabilities lacks insight into illness;limited support system   Interprofessional Rounds   Summary Discussed patient progress and update on commitment hearings (preliminary yesterday, final tomorrow). Plan to discharge to home with OP supports. Consider 55+ IOP referral.   Participants nursing;CTC;psychiatrist;other (see comments)   Behavioral Team Discussion   Participants Dr. Sergio MD; Dr. Abhilash MD; Arabella BENTLEY; Thelma NUNEZ [facility]; medical students   Progress Pt is progressing   Anticipated length of stay 10-14 days   Continued Stay Criteria/Rationale Symptom stabilization, medication management, care coordination   Medical/Physical See H&P   Precautions See below   Plan Psychiatric assessment/Medication management. Therapeutic Milieu. Individual care planning and after care planning. Patient to participate in unit groups and activities. Individual and group support on unit.   Safety Plan Per unit protocol   Anticipated Discharge Disposition home or self-care     PRECAUTIONS AND SAFETY    Behavioral Orders   Procedures    Code 1 - Restrict to Unit    Routine Programming     As clinically indicated    Status 15     Every 15 minutes.       Safety  Safety WDL: WDL  Patient Location: hallway  Observed Behavior: calm, walking  Observed Behavior (Comment): sitting  Safety Measures: safety rounds completed  Diversional Activity:  (sleeping)  De-Escalation Techniques: appropriate behavior reinforced  Suicidality: Status 15  Seizure precautions: clutter free environment, calm, consistent lighting          
Cardiac

## 2024-07-02 NOTE — PLAN OF CARE
No PRNs given or requested this shift. Safety checks completed every 15 minutes; no concerns noted. Pt appears to have slept for  6.50 hours; will continue to monitor and offer support.     Problem: Sleep Disturbance  Goal: Adequate Sleep/Rest  Outcome: Progressing   Goal Outcome Evaluation:

## 2024-07-02 NOTE — PROGRESS NOTES
Internal Medicine Brief Cross Cover Note:     Was notified per evening swing doc that stat consult was placed for blood pressure 187/80 and patient was asymptomatic. I called nursing staff who noted that the blood pressure was never rechecked. On recheck the patient's blood pressure was 160 systolic. Discussed with nursing staff that given recheck is improved and first blood pressure was taken when patient was feeling stressed, no need for stat consult. Pt was napping and asymptomatic. Hydralazine was ordered for sbp >175 and dbp > 105. Discussed with nursing staff the importance of rechecking blood pressure readings. Routine consult will be placed and can be conducted in the AM 7/3/24.     SHEREE Berry [facility]  Securely message with the Vocera Web Console (learn more here)  Text page via [employer] Paging/Directory

## 2024-07-02 NOTE — PROGRESS NOTES
"  ----------------------------------------------------------------------------------------------------------  [facility]  Psychiatry Progress Note  Hospital Day #7    Interim History:      The patient's care was discussed with the treatment team and chart notes were reviewed.     Vitals: Temp: 97.9  F (36.6  C) Temp src: Oral BP: (!) 150/81 Pulse: 77     SpO2: 100 %   Sleep: 6.50 hours (07/02/24 0540)  Scheduled medications: Took all scheduled medications as prescribed   Psychiatric PRN medications:   No psychiatric prns given     Staff Report:   In summary, patient appears bright and pleasant. Patient visible on the unit and intermittently pacing on the hallway. Patient could be seen socially interacting with peers. Her mood is improved today. She reports she does not feel the same \"groggy\" side effects from zyprexa that she did yesterday.      Subjective:      Patient Interview:  Maribell Mendoza was seen by the team outside her room. She reports she is feeling better since the preliminary court hearing yesterday. She was able to talk to her son who is in support of bringing her home at the time of discharge.    She is feeling better on zyprexa and has not felt the same \"groggy\" symptoms that she was yesterday. She slept well last night aside from some disruptions caused by commotion on the unit. She will make sure to have good sleep hygiene including continuing to take her meds.    She reports that she was in the hospital for bipolar for two years the first time that diagnosis was brought up (around 1970s). She does not agree with that diagnosis, but if that were the case she would like to have much clearer communication this time around. She does not think she has bipolar currently, as she feels she would have noticed it before she was 74. She mentioned that she had a steroid injection in her knee a couple weeks ago to avoid needing a knee replacement and feels that " might have something to do with her current state.     ROS:  - No acute concerns      Objective:      Vitals:  BP (!) 150/81 (BP Location: Right arm, Patient Position: Sitting, Cuff Size: Adult Regular)   Pulse 77   Temp 97.8  F (36.6  C) (Oral)   Resp 20   Wt 74.5 kg (164 lb 3.2 oz)   SpO2 100%   BMI 29.09 kg/m       Allergies:  Allergies         Allergies   Allergen Reactions    Alendronate Other (See Comments)       PN:  SWALLOWING    Sulfa Antibiotics Hives       PN: HIVES            Current Medications:  Scheduled:  IP Meds Scheduled             Current Facility-Administered Medications   Medication Dose Route Frequency Provider Last Rate Last Admin    acetaminophen (TYLENOL) tablet 650 mg  650 mg Oral Q4H PRN Dagher, Ramez, MD        alum & mag hydroxide-simethicone (MAALOX) suspension 30 mL  30 mL Oral Q4H PRN Dagher, Ramez, MD        atorvastatin (LIPITOR) tablet 20 mg  20 mg Oral Daily Dagher, Ramez, MD   20 mg at 06/30/24 0850    folic acid (FOLVITE) tablet 1 mg  1 mg Oral Daily Dagher, Ramez, MD   1 mg at 06/30/24 0850    levothyroxine (SYNTHROID/LEVOTHROID) tablet 88 mcg  88 mcg Oral Daily Dagher, Ramez, MD   88 mcg at 06/30/24 0850    melatonin tablet 3 mg  3 mg Oral At Bedtime PRN Dagher, Ramez, MD        multivitamin  with lutein (OCUVITE WITH LUTEIN) per capsule 1 capsule  1 capsule Oral Daily Dagher, Ramez, MD   1 capsule at 06/30/24 0850    OLANZapine (zyPREXA) tablet 2.5 mg  2.5 mg Oral TID PRN Dagher, Ramez, MD         Or    OLANZapine (zyPREXA) injection 2.5 mg  2.5 mg Intramuscular TID PRN Dagher, Ramez, MD        OLANZapine (zyPREXA) tablet 10 mg  10 mg Oral At Bedtime Rudy Hung MD   10 mg at 06/30/24 2151    polyethylene glycol (MIRALAX) Packet 17 g  17 g Oral Daily PRN Dagher, Ramez, MD        thiamine (B-1) tablet 100 mg  100 mg Oral Daily Dagher, Ramez, MD   100 mg at 06/30/24 0850            PRN:  IP Meds PRN             Current Facility-Administered Medications   Medication  Dose Route Frequency Provider Last Rate Last Admin    acetaminophen (TYLENOL) tablet 650 mg  650 mg Oral Q4H PRN Dagher, Ramez, MD        alum & mag hydroxide-simethicone (MAALOX) suspension 30 mL  30 mL Oral Q4H PRN Dagher, Ramez, MD        atorvastatin (LIPITOR) tablet 20 mg  20 mg Oral Daily Dagher, Ramez, MD   20 mg at 06/30/24 0850    folic acid (FOLVITE) tablet 1 mg  1 mg Oral Daily Dagher, Ramez, MD   1 mg at 06/30/24 0850    levothyroxine (SYNTHROID/LEVOTHROID) tablet 88 mcg  88 mcg Oral Daily Dagher, Ramez, MD   88 mcg at 06/30/24 0850    melatonin tablet 3 mg  3 mg Oral At Bedtime PRN Dagher, Ramez, MD        multivitamin  with lutein (OCUVITE WITH LUTEIN) per capsule 1 capsule  1 capsule Oral Daily Dagher, Ramez, MD   1 capsule at 06/30/24 0850    OLANZapine (zyPREXA) tablet 2.5 mg  2.5 mg Oral TID PRN Dagher, Ramez, MD         Or    OLANZapine (zyPREXA) injection 2.5 mg  2.5 mg Intramuscular TID PRN Dagher, Ramez, MD        OLANZapine (zyPREXA) tablet 10 mg  10 mg Oral At Bedtime AbhilashRudy MD   10 mg at 06/30/24 2151    polyethylene glycol (MIRALAX) Packet 17 g  17 g Oral Daily PRN Dagher, Ramez, MD        thiamine (B-1) tablet 100 mg  100 mg Oral Daily Dagher, Ramez, MD   100 mg at 06/30/24 0850            Labs and Imaging:  New results:   No new results over past 24 hours     Data this admission:  - CBC unremarkable  - BMP unremarkable  - TSH normal  - UDS negative   - Hgb   - Vit B12 1,707 (6/20/2024)  - Folate >40 (6/20/2024)  - Urinalysis unremarkable  - EKG (6/13/2024) Sinus rhythm; Cannot rule out Inferior infarct, age undetermined;   Cannot rule out Anterior infarct, age undetermined; Abnormal ECG. QTc 435ms      Mental Status Exam:      Oriented to:  Grossly Oriented  General:  Awake and Alert  Appearance:  appears stated age, Grooming is adequate, and Dressed in personal clothing  Behavior/Attitude:  Cooperative, Engaged, Easy to redirect, and Open  Eye Contact: Appropriate  Psychomotor:  "No evidence of tics, dystonia, or tardive dyskinesia  and gesticulations no catatonia present  Speech:  appropriate volume/tone, talkative, and expansive  Language: Fluent in English with appropriate syntax and vocabulary.  Mood:  \"much better\"  Affect:  congruent to mood   Thought Process:  looseness of association and circumstantial  Thought Content:   No SI/HI/AH/VH, does not appear to be responding to internal stimuli, and delusions; delusions of paranoia  Associations: questionable  Insight:  impaired due to thinking she is at her baseline   Judgment:  impaired due to believing she does not require care for mental illness   Impulse control: limited  Attention Span:  adequate for conversation  Concentration:  grossly intact  Recent and Remote Memory:  grossly intact, recalls details from last hospitalization  Fund of Knowledge: average  Muscle Strength and Tone: normal  Gait and Station: Normal      Psychiatric Assessment      Maribell Mendoza is a 74 year old female with possible previous psychiatric diagnosis of bipolar I disorder (1970s) who presented via EMS with psychosis in the context of recent travel. Most recent psychiatric hospitalization was in the 1970s, and had a hospitalization for medical rule out of current symptoms 6/13/2024-6/20. Significant symptoms on admission included paranoid delusions and verbal aggression. The MSE on admission was pertinent for labile affect, inattentive concentration, paranoia, and tangential thought process with flight of ideas. Biological contributions to mental health presentation include a possible past diagnosis of bipolar I disorder, and possible misuse of alcohol for insomnia. Psychological contributions to mental health presentation include lack of insight. Social factors contributing to mental health presentation include the pt living alone for past 30 years following divorce from spouse. Protective factors include supportive family and participation in " Mu-ism community.      In summary, the patient's reported symptoms of acute onset decreased need for sleep, paranoid delusions, and pressured speech are consistent with emmy with psychotic features. Given the presentation and past diagnosis of bipolar I disorder, the most likely diagnosis is decompensation of bipolar I disorder, mixed episodes.      She is benefitting from pharmacologic stabilization and establishment of longitudinal psychiatric care this admission. She is having some sedating side effects from the increased olanzapine, that is resolving, so will let her continue to adjust to the change.          Psychiatric Plan by Diagnosis      Today's changes:  - Continue with current care plan. Will check in after final court hearing tomorrow (7/3/2024)     # primary psychosis vs encephalopathy unspecified   1. Medications:  - Continue PTA Olanzapine (7.5mg) -> Increased to 10mg (06/27/2024)  - PTA thiamine      2. Pertinent Labs/Monitoring:   - CBC WNL on 06/22  - Urinalysis WNL on 06/22.  - BMP WNL on 06/20  - MRI Brain with no acute abnormalities on 06/18  - Paraneoplastic panel pending  - Chest XR 6/19 showed no acute abnormalities   - EKG last on 6/13 showed  NSR  - B12 1,707 6/20 and folate elevated at >40.0 6/20  - Ammonia on 6/13 was wnl     3. Additional Plans:  - Patient will be treated in therapeutic milieu with appropriate individual and group therapies as described      Psychiatric Hospital Course:       Maribell Mendoza was admitted to [facility] as a voluntary patient  Medications:  PTA Olanzapine (7.5mg) was continued as well as pt's PTA nonpsychiatric medications   Olanzapine HS PO increased to 10mg (06/27/2024).  No PTA meds were held.   New medications started at the time of admission include none.      The risks, benefits, alternatives, and side effects were discussed and understood by the patient and other caregivers.      Medical Assessment and Plan      Medical diagnoses to be  addressed this admission:    #Hyperlipidemia  - Continue atorvastatin 20mg     #Osteoporosis  - 400-400mg  Glucosamine-chondroitin -> discontinued on 6/26/2024     #Hyperthoidism with Hashimoto's Thyroiditis  - Continue Levothroxine 88mcg     # Medical work up for psychosis  Medical admission on 06/13-06/20. Originally presented mild CMP changes: with elevated BUN 27.3, Cr 1.01, and anion gap of 17. Total imchel slightly elevated at 1.3 as well as lipase of 63.  UA showed some ketones. BMP showed intermittent improvement and worsening over that hospital stay.   - Per neuro consult, recommended autoimmune work up, received a Paraneoplastic panel which was unremarkable, and a Brain MRI which showed no acute intercranial abnormality      Medical course: Patient was physically examined by the ED prior to being transferred to the unit and was found to be medically stable and appropriate for admission.      Consults:  none      Checklist      Legal Status: Orders Placed This Encounter      Legal status Court Hold           Safety Assessment:        Behavioral Orders   Procedures    Code 1 - Restrict to Unit    Routine Programming       As clinically indicated    Status 15       Every 15 minutes.         Risk Assessment:  Risk for harm is moderate-high.  Risk factors: family dynamics and impulsive  Protective factors: family      SIO: Not indicated at this time     Disposition: TBD. Disposition pending clinical stabilization, medication optimization and development of an appropriate discharge plan       Attestations    Niall Martin, MS3      This patient was seen and discussed with my attending physician.    Rduy Hung MD  Psychiatry Resident Physician    Attestation:  This patient has been seen and evaluated by me, Kizzy Hill MD.  I have discussed this patient with the house staff team including the resident and medical student and I agree with the findings and plan in this note.    I have reviewed today's vital  signs, medications, labs and imaging. Kizzy Hill MD , PhD.

## 2024-07-02 NOTE — PLAN OF CARE
07/02/24 1200   General Information   Art Directive other (see comments)     Goal Outcome Evaluation:      Rehab Group    Start time: 1115  End time: 1215  Patient time total: 60 minutes    attended full group    #5 attended   Group Type: art   Group Topic Covered: emotional regulation       Group Session Detail:  Art Therapy directive was to create an acrylic painting in response to a chose prompt and/or creating a painting using a pre-drawn image on canvas.  Goals of directive: emotional expression, emotional regulation, media exploration, mindfulness.     Patient Response/Contribution:  cooperative with task       Patient Detail:    Pt was an engaged participant, focused on task for the full duration of group. Pt finished artwork and has not yet verbally processed with author and group. (Pt created two abstract paintings using lines, shapes and colors). Pt's mood was calm, pleasant participant.        Activity Therapy ()    Patient Active Problem List   Diagnosis    Chronic pain of left knee    Psychosis (H)    Acute encephalopathy    Paranoia (H)    Disorganized behavior    Psychosis, unspecified psychosis type (H)    Psychotic episode (H)

## 2024-07-02 NOTE — PLAN OF CARE
BEH IP Unit Acuity Rating Score (UARS)  Patient is given one point for every criteria they meet.    CRITERIA SCORING   On a 72 hour hold, court hold, committed, stay of commitment, or revocation. 1    Patient LOS on BEH unit exceeds 20 days. 0  LOS: 7   Patient under guardianship, 55+, otherwise medically complex, or under age 11. 1   Suicide ideation without relief of precipitating factors. 0   Current plan for suicide. 0   Current plan for homicide. 0   Imminent risk or actual attempt to seriously harm another without relief of factors precipitating the attempt. 0   Severe dysfunction in daily living (ex: complete neglect for self care, extreme disruption in vegetative function, extreme deterioration in social interactions). 1   Recent (last 7 days) or current physical aggression in the ED or on unit. 0   Restraints or seclusion episode in past 72 hours. 0   Recent (last 7 days) or current verbal aggression, agitation, yelling, etc., while in the ED or unit. 0   Active psychosis. 0   Need for constant or near constant redirection (from leaving, from others, etc).  0   Intrusive or disruptive behaviors. 0   Patient requires 3 or more hours of individualized nursing care per 8-hour shift (i.e. for ADLs, meds, therapeutic interventions). 0   TOTAL 3

## 2024-07-02 NOTE — PLAN OF CARE
"Pt denies SI.  Pt been very very social.,  Pt talks a lot and tend to jump around in the topics they are talking about.  Pt talked a lot of the injection in the knee she had few weeks ago and wondering if this was a steroid and would have caused this emmy.  Pt showered.  Attended some groups.  Medication compliant.  No behavior issues.    Problem: Adult Behavioral Health Plan of Care  Goal: Plan of Care Review  Outcome: Not Progressing  Goal: Patient-Specific Goal (Individualization)  Description: You can add care plan individualizations to a care plan. Examples of Individualization might be:  \"Parent requests to be called daily at 9am for status\", \"I have a hard time hearing out of my right ear\", or \"Do not touch me to wake me up as it startles  me\".  Outcome: Not Progressing  Goal: Adheres to Safety Considerations for Self and Others  Outcome: Not Progressing  Intervention: Develop and Maintain Individualized Safety Plan  Recent Flowsheet Documentation  Taken 7/2/2024 1209 by Arabella Bolden RN  Safety Measures:   environmental rounds completed   safety rounds completed  Goal: Absence of New-Onset Illness or Injury  Outcome: Not Progressing  Intervention: Identify and Manage Fall Risk  Recent Flowsheet Documentation  Taken 7/2/2024 1209 by Arabella Bolden RN  Safety Measures:   environmental rounds completed   safety rounds completed  Goal: Optimized Coping Skills in Response to Life Stressors  Outcome: Not Progressing  Goal: Develops/Participates in Therapeutic [place] to Support Successful Transition  Outcome: Not Progressing   Goal Outcome Evaluation:                        "

## 2024-07-03 PROCEDURE — 99232 SBSQ HOSP IP/OBS MODERATE 35: CPT | Mod: GC | Performed by: PSYCHIATRY & NEUROLOGY

## 2024-07-03 PROCEDURE — 124N000002 HC R&B MH UMMC

## 2024-07-03 PROCEDURE — G0177 OPPS/PHP; TRAIN & EDUC SERV: HCPCS

## 2024-07-03 PROCEDURE — 250N000013 HC RX MED GY IP 250 OP 250 PS 637

## 2024-07-03 PROCEDURE — H2032 ACTIVITY THERAPY, PER 15 MIN: HCPCS

## 2024-07-03 RX ADMIN — Medication 1 CAPSULE: at 08:50

## 2024-07-03 RX ADMIN — FOLIC ACID 1 MG: 1 TABLET ORAL at 08:50

## 2024-07-03 RX ADMIN — ATORVASTATIN CALCIUM 20 MG: 20 TABLET, FILM COATED ORAL at 08:50

## 2024-07-03 RX ADMIN — OLANZAPINE 10 MG: 10 TABLET, FILM COATED ORAL at 21:54

## 2024-07-03 RX ADMIN — THIAMINE HCL TAB 100 MG 100 MG: 100 TAB at 08:50

## 2024-07-03 RX ADMIN — LEVOTHYROXINE SODIUM 88 MCG: 88 TABLET ORAL at 08:50

## 2024-07-03 ASSESSMENT — ACTIVITIES OF DAILY LIVING (ADL)
ADLS_ACUITY_SCORE: 48
ORAL_HYGIENE: INDEPENDENT
ADLS_ACUITY_SCORE: 48
HYGIENE/GROOMING: INDEPENDENT
DRESS: INDEPENDENT;SCRUBS (BEHAVIORAL HEALTH)

## 2024-07-03 ASSESSMENT — ANXIETY QUESTIONNAIRES
7. FEELING AFRAID AS IF SOMETHING AWFUL MIGHT HAPPEN: NOT AT ALL
IF YOU CHECKED OFF ANY PROBLEMS ON THIS QUESTIONNAIRE, HOW DIFFICULT HAVE THESE PROBLEMS MADE IT FOR YOU TO DO YOUR WORK, TAKE CARE OF THINGS AT HOME, OR GET ALONG WITH OTHER PEOPLE: SOMEWHAT DIFFICULT
4. TROUBLE RELAXING: MORE THAN HALF THE DAYS
3. WORRYING TOO MUCH ABOUT DIFFERENT THINGS: SEVERAL DAYS
GAD7 TOTAL SCORE: 6
6. BECOMING EASILY ANNOYED OR IRRITABLE: NOT AT ALL
2. NOT BEING ABLE TO STOP OR CONTROL WORRYING: SEVERAL DAYS
GAD7 TOTAL SCORE: 6
1. FEELING NERVOUS, ANXIOUS, OR ON EDGE: SEVERAL DAYS
5. BEING SO RESTLESS THAT IT IS HARD TO SIT STILL: SEVERAL DAYS

## 2024-07-03 ASSESSMENT — PATIENT HEALTH QUESTIONNAIRE - PHQ9: SUM OF ALL RESPONSES TO PHQ QUESTIONS 1-9: 5

## 2024-07-03 NOTE — PLAN OF CARE
BEH IP Unit Acuity Rating Score (UARS)  Patient is given one point for every criteria they meet.    CRITERIA SCORING   On a 72 hour hold, court hold, committed, stay of commitment, or revocation. 1    Patient LOS on BEH unit exceeds 20 days. 0  LOS: 8   Patient under guardianship, 55+, otherwise medically complex, or under age 11. 1   Suicide ideation without relief of precipitating factors. 0   Current plan for suicide. 0   Current plan for homicide. 0   Imminent risk or actual attempt to seriously harm another without relief of factors precipitating the attempt. 0   Severe dysfunction in daily living (ex: complete neglect for self care, extreme disruption in vegetative function, extreme deterioration in social interactions). 1   Recent (last 7 days) or current physical aggression in the ED or on unit. 0   Restraints or seclusion episode in past 72 hours. 0   Recent (last 7 days) or current verbal aggression, agitation, yelling, etc., while in the ED or unit. 0   Active psychosis. 0   Need for constant or near constant redirection (from leaving, from others, etc).  0   Intrusive or disruptive behaviors. 0   Patient requires 3 or more hours of individualized nursing care per 8-hour shift (i.e. for ADLs, meds, therapeutic interventions). 0   TOTAL 3

## 2024-07-03 NOTE — PLAN OF CARE
Safety checks completed every 15 minutes; no concerns noted. Pt remained in her room the entire shift. No PRNs given or requested this shift. Pt appears to have slept for 6.50  hours; will continue to monitor and offer support.     Problem: Sleep Disturbance  Goal: Adequate Sleep/Rest  Outcome: Progressing   Goal Outcome Evaluation:

## 2024-07-03 NOTE — PLAN OF CARE
Team Note Due:  Tuesday    Assessment/Intervention/Current Symtoms and Care Coordination:  Chart review and met with team, discussed pt progress, symptomology, and response to treatment.  Discussed the discharge plan and any potential impediments to discharge.    Connected with PPS regarding possible solutions for pt's commitment. Consulted with MD. Pt has agreed to a stay of commitment.    [facility] DA completed and returned to PPS. PPS stated the final hearing will still take place at 11am. PPS aware of plan for discharge Friday. She sent two ROIs (Karthik and pt's son) requesting pt sign and have them returned.     Pt attended final hearing.    DA consult made for pt to be referred to 55+ IOP. DA to be completed this afternoon.    Met with pt to discuss her final hearing and stay of commitment. Let pt know we will be receiving documents hopefully today pertaining to the stay, so we will review those once they come in. Pt signed ROIs as requested by PPS. Sent ROIs back to PPS.    Received signed order for stay of commitment via fax. Copy given to pt, copy placed in pt's paper chart. Updated MD to update legal status to voluntary.    Discharge Plan or Goal:  Discharge to home with OP supports     Barriers to Discharge:  Plan for discharge on 7/5. Pt will complete DA for 55+ IOP.     Referral Status:  PCP  Psychiatry  [facility] 55+ IOP     Legal Status:  Voluntary (stay of commitment)  [facility]  File Number: [id number]  Start and expiration date of commitment: 7/3/24-1/3/25    Tricia Brothers (PPS):  [phone] (office)  [phone] (cell)  [email or web address]    Contacts:  Tom Mendoza (son): [phone]       Upcoming Meetings and Dates/Important Information and next steps:

## 2024-07-03 NOTE — PROGRESS NOTES
LOCUS Worksheet     Name: Maribell Mendoza MRN: [id number]    : 1950      Gender:  female    PMI:  NA   Provider Name: [facility]   Provider NPI:  [id number]    Actual level of Care Provided:  Assessment and Referral     Service(s) receiving or referred to:  IOP 55+ and Psychiatry for Medications Management    Reason for Variance: Anxiety, Depression, Bipolar 1 Disorder      Rating completed by: [facility]      I. Risk of Harm:   2      Low Risk of Harm    II. Functional Status:   2      Mild Impairment    III. Co-Morbidity:   2      Minor Co-Morbidity    IV - A. Recovery Environment - Level of Stress:   3      Moderately Stress Environment    IV - B. Recovery Environment - Level of Support:   3      Limited Support in Environment    V. Treatment and Recovery History:   3      Moderate to Equivocal Response to Treatment and Recovery Management    VI. Engagement and Recovery Project:   2      Positive Engagement and Recovery       17 Composite Score    Level of Care Recommendation:   17 to 19       High Intensity Community Based Services

## 2024-07-03 NOTE — PROGRESS NOTES
Brief Medicine Note   3 July 2024      Internal medicine consulted for elevated BPs.  Attempted to see patient who was sleeping soundly.  BPs reviewed.  For now, continue PRN hydralazine as currently ordered.  Please notify internal medicine if new complaints of chest pain, dyspnea, headache, or vision changes.  Will reattempt visit tomorrow.        Tanesha Velasco CNP, APRN  Internal Medicine [facility] Hospitalist  [facility]  Pager [phone]

## 2024-07-03 NOTE — CONSULTS
DA IP CONSULT COMPLETE  Recommendations: IOP for Senior 55+ and Psychiatry for Medications Management.

## 2024-07-03 NOTE — PLAN OF CARE
"Pt denies SI.  Pt states she feels like her normal self.  Pt stated this is all a misunderstanding.  Pt had intake with senior outpatient program.  Pt showered attends to ADLS.  Pt takes her medications.  Pt affect bright.  Pt continues to be hyper verbal.  But is pleasant and cooperative.    Problem: Adult Behavioral Health Plan of Care  Goal: Plan of Care Review  Outcome: Not Progressing  Goal: Patient-Specific Goal (Individualization)  Description: You can add care plan individualizations to a care plan. Examples of Individualization might be:  \"Parent requests to be called daily at 9am for status\", \"I have a hard time hearing out of my right ear\", or \"Do not touch me to wake me up as it startles  me\".  Outcome: Not Progressing  Goal: Adheres to Safety Considerations for Self and Others  Outcome: Not Progressing  Intervention: Develop and Maintain Individualized Safety Plan  Recent Flowsheet Documentation  Taken 7/3/2024 1000 by Arabella Bolden RN  Safety Measures: safety rounds completed  Goal: Absence of New-Onset Illness or Injury  Outcome: Not Progressing  Intervention: Identify and Manage Fall Risk  Recent Flowsheet Documentation  Taken 7/3/2024 1000 by Arabella Bolden RN  Safety Measures: safety rounds completed  Goal: Optimized Coping Skills in Response to Life Stressors  Outcome: Not Progressing  Goal: Develops/Participates in Therapeutic [place] to Support Successful Transition  Outcome: Not Progressing   Goal Outcome Evaluation:                        "

## 2024-07-03 NOTE — PROGRESS NOTES
"Rehab Group    Start time: 1115  End time: 1215  Patient time total: 15 minutes    attended partial group    #5 attended   Group Type: self-care and music   Group Topic Covered: cognitive therapy and mindfulness     Group Session Detail: Pts engaged in writing, speaking and affirming each others \"Just For Today\" Affirmations.     Patient Response/Contribution:  organized, supportive of peers, socially appropriate, and actively engaged     Patient Detail: Calm, focused participation.  Shared openly with the group about hopes and fears.      Activity Therapy Per 15 minutes () Other Rehab therapies    Patient Active Problem List   Diagnosis    Chronic pain of left knee    Psychosis (H)    Acute encephalopathy    Paranoia (H)    Disorganized behavior    Psychosis, unspecified psychosis type (H)    Psychotic episode (H)      "

## 2024-07-03 NOTE — PROGRESS NOTES
"Outpatient Mental Health Services - Adult    MY COPING PLAN FOR SAFETY    PATIENT'S NAME: Maribell Mendoza  MRN:   [id number]    SAFETY PLAN:    Step 1: Warning signs / cues (Thoughts, images, mood, situation, behavior) that a crisis may be developing:    Thoughts: \"I don't matter\", \"People would be better off without me\", \"I'm a burden\", \"I can't do this anymore\", \"I just want this to end\", and \"Nothing makes it better\"  Images: None reported  Thinking Processes: ruminations (can't stop thinking about my problems):  , racing thoughts, intrusive thoughts (bothersome, unwanted thoughts that come out of nowhere):  , highly critical and negative thoughts:  , disorganized thinking:  , and paranoia:    Mood: worsening depression, hopelessness, intense worry, agitation, and mood swings  Behaviors: isolating/withdrawing , aggression, not taking care of myself, not taking care of my responsibilities, sleeping too much, and not sleeping enough  Situations: changes in symptoms: Anxiety and Depression, pain, relationship problems, and financial stress     Step 2: Coping strategies - Things I can do to take my mind off of my problems without contacting another person (relaxation technique, physical activity):    Distress Tolerance Strategies:  relaxation activities: Varies, arts and crafts: Varies, listen to positive and upbeat music: Varies, and paced breathing/progressive muscle relaxation  Physical Activities: go for a walk, exercise: Varies, meditation, deep breathing, and stretching   Focus on helpful thoughts:  \"This is temporary\", \"I will get through this\", \"It always passes\", think about happy memories:  , remind myself of what is important to me: my son and the rest my family, and self-compassion statements:      Step 3: People and social settings that provide distraction: My close Friends, My cousin, My next door neighbor     Name: My close Friends from [employer], My counsing, My next door neighbor,  Phone: " MIKO   Name: MIKO Phone: MIKO   coffee shop, park, volunteering, gym , support group (i.e. twelve-step), and people from pastoral support      Step 4: Remind myself of people and things that are important to me and worth living for:  My Son, his family and my close relatives.     Step 5: When I am in crisis, I can ask these people to help me use my safety plan: My Cousins and My Son     Name: My Cousins and My Son Phone: MIKO   Name: MIKO Phone: MIKO    Step 6: Making the environment safe:     secure medications:   and be around others    Step 7: Professionals or agencies I can contact during a crisis:    Suicide Prevention Lifeline: 3-570-253-TALK (2621)  Crisis Text Line Service (available 24 hours a day, 7 days a week): Text [place] to 608552  Call  **CRISIS (363674) from a cell phone to talk to a team of professionals who can help you.    Crisis Services By [facility]: Phone Number:   [place]     [phone]   [place]    [phone]   [place]    [phone]   [place]    [phone]   [place]    [phone]   [place] [phone]   [place]     [phone]     Call 911 or go to my nearest emergency department.     I helped develop this safety plan and agree to use it when needed.  I have been given a copy of this plan.      Client signature _________________________________________________________________  Today s date:  7/3/2024  Adapted from Safety Plan Template 2008 Nelly Rubi and Juwan Garza is reprinted with the express permission of the authors.  No portion of the Safety Plan Template may be reproduced without the express, written permission.  You can contact the authors at [email or web address] or [email or web address]

## 2024-07-03 NOTE — CONSULTS
Brief Medicine Consult Note   4 July 2024    Medicine consult placed for elevated BPs.  Discussed this AM with patient.  She denies chest pain, dyspnea, headaches, and vision changes.  She tells me that yesterday the staff realized they had a malfunctioning cuff.  BPs today are normal and unconcerning.  Of note, patient maintains a very physically active lifestyle and has no previous history of HTN.  No indication to start scheduled antihypertensive at this time.  Continue to optimize treatment of anxiety and depression.        Medicine will sign off, no further recommendations at this time.  Please feel free to reconsult if patient's symptoms worsen or if new problems arise.  Thank you for the opportunity to care for this patient.       Tanesha Velasco, CNP, APRN  Internal Medicine [facility]  Pager [phone]

## 2024-07-03 NOTE — PLAN OF CARE
Problem: Anxiety  Goal: Anxiety Reduction or Resolution  Outcome: Progressing     Problem: Psychotic Signs/Symptoms  Goal: Improved Behavioral Control (Psychotic Signs/Symptoms)  Outcome: Progressing   Goal Outcome Evaluation:     Pt was visible in the milieu watching TV. Affect bright. Pt was observed socializing with peers. Mood calm. Pt was pleasant and cooperative upon assessments. Pt denied pain or discomfort. Denied all mental health and psych symptoms. Pt B/P was elevated at 1600 pm . Notify the resident. He stated writer should  recheck B/p  and administer PRN medication. If  b/p is still elevated.  Rechecked B/p at 1744 pm it was down to 140/76. Internal medicine came to see pt   she was sleeping so he left. Pt denies, chest pain,  head ache, vision changes dyspnea and dizziness. Pt was  pleasant cooperative and compliant with medication. No safety concerns. Pt B/P at 2155 pm  was 141/71. Pt is resting comfortably.

## 2024-07-03 NOTE — PROGRESS NOTES
----------------------------------------------------------------------------------------------------------  [facility]  Psychiatry Progress Note  Hospital Day #8     Interim History:      The patient's care was discussed with the treatment team and chart notes were reviewed.     Vitals: Temp: 97.7  F (36.5  C) Temp src: Oral BP: 137/82 Pulse: 68     SpO2: 100 %   Sleep: 6.50 hours (07/03/24 0242)  Scheduled medications: Took all scheduled medications as prescribed   Psychiatric PRN medications:   No psychiatric prns given     Staff Report:   Pt denies SI. Pt been very very social., Pt talks a lot and tend to jump around in the topics they are talking about. Pt talked a lot of the injection in the knee she had few weeks ago and wondering if this was a steroid and would have caused this emmy. Pt showered. Attended some groups. Medication compliant. No behavior issues.       Subjective:      Patient Interview:  Maribell PRICILLA Mendoza was seen by the team outside of her room. She explained that there are times where things that at times things are going by 'haywire' and she's having a difficult time removing herself from the stressors she's observing in other patients and that this is 'not a normal situation for anyone'. She reports that she has identified her own techniques to address these episodes her including receding to her room and exercising via stationary bike or walking down the hallway.     She understands and agrees that she wants to avoid a heart attack or a stroke and recognizes that she has the hydralazine for moments where she does have elevated blood pressure. However, she does not want to start any daily blood pressure medications because she does not believe she actually has high blood pressure - states her blood pressure is only high because this is a very abnormal environment and she can't have her own routines here. She is commonly stressed by the commotion  of other residents/staff in the hallway.    She states that she feels like she is close to being back to her normal self and is agreeable to the discharge plans (likely Friday) and for her son to pick her up. Her final hearing is today.     ROS:  - No acute concerns      Objective:      Vitals:  /82 (BP Location: Right arm, Patient Position: Sitting, Cuff Size: Adult Regular)   Pulse 68   Temp 97.7  F (36.5  C) (Oral)   Resp 20   Wt 74.5 kg (164 lb 3.2 oz)   SpO2 100%   BMI 29.09 kg/m       Allergies:  Allergies             Allergies   Allergen Reactions    Alendronate Other (See Comments)       PN:  SWALLOWING    Sulfa Antibiotics Hives       PN: HIVES            Current Medications:  Scheduled:  IP Meds Scheduled                     Current Facility-Administered Medications   Medication Dose Route Frequency Provider Last Rate Last Admin    acetaminophen (TYLENOL) tablet 650 mg  650 mg Oral Q4H PRN Dagher, Ramez, MD        alum & mag hydroxide-simethicone (MAALOX) suspension 30 mL  30 mL Oral Q4H PRN Dagher, Ramez, MD        atorvastatin (LIPITOR) tablet 20 mg  20 mg Oral Daily Dagher, Ramez, MD   20 mg at 06/30/24 0850    folic acid (FOLVITE) tablet 1 mg  1 mg Oral Daily Dagher, Ramez, MD   1 mg at 06/30/24 0850    levothyroxine (SYNTHROID/LEVOTHROID) tablet 88 mcg  88 mcg Oral Daily Dagher, Ramez, MD   88 mcg at 06/30/24 0850    melatonin tablet 3 mg  3 mg Oral At Bedtime PRN Dagher, Ramez, MD        multivitamin  with lutein (OCUVITE WITH LUTEIN) per capsule 1 capsule  1 capsule Oral Daily Dagher, Ramez, MD   1 capsule at 06/30/24 0850    OLANZapine (zyPREXA) tablet 2.5 mg  2.5 mg Oral TID PRN Dagher, Ramez, MD         Or    OLANZapine (zyPREXA) injection 2.5 mg  2.5 mg Intramuscular TID PRN Dagher, Ramez, MD        OLANZapine (zyPREXA) tablet 10 mg  10 mg Oral At Bedtime Abhilash, MD Rudy   10 mg at 06/30/24 2151    polyethylene glycol (MIRALAX) Packet 17 g  17 g Oral Daily PRN Dagher, Ramez, MD         thiamine (B-1) tablet 100 mg  100 mg Oral Daily Dagher, Ramez, MD   100 mg at 06/30/24 0850            PRN:  IP Meds PRN                     Current Facility-Administered Medications   Medication Dose Route Frequency Provider Last Rate Last Admin    acetaminophen (TYLENOL) tablet 650 mg  650 mg Oral Q4H PRN Dagher, Ramez, MD        alum & mag hydroxide-simethicone (MAALOX) suspension 30 mL  30 mL Oral Q4H PRN Dagher, Ramez, MD        atorvastatin (LIPITOR) tablet 20 mg  20 mg Oral Daily Dagher, Ramez, MD   20 mg at 06/30/24 0850    folic acid (FOLVITE) tablet 1 mg  1 mg Oral Daily Dagher, Ramez, MD   1 mg at 06/30/24 0850    levothyroxine (SYNTHROID/LEVOTHROID) tablet 88 mcg  88 mcg Oral Daily Dagher, Ramez, MD   88 mcg at 06/30/24 0850    melatonin tablet 3 mg  3 mg Oral At Bedtime PRN Dagher, Ramez, MD        multivitamin  with lutein (OCUVITE WITH LUTEIN) per capsule 1 capsule  1 capsule Oral Daily Dagher, Ramez, MD   1 capsule at 06/30/24 0850    OLANZapine (zyPREXA) tablet 2.5 mg  2.5 mg Oral TID PRN Dagher, Ramez, MD         Or    OLANZapine (zyPREXA) injection 2.5 mg  2.5 mg Intramuscular TID PRN Dagher, Ramez, MD        OLANZapine (zyPREXA) tablet 10 mg  10 mg Oral At Bedtime Abhilash, MD Rudy   10 mg at 06/30/24 2151    polyethylene glycol (MIRALAX) Packet 17 g  17 g Oral Daily PRN Dagher, Ramez, MD        thiamine (B-1) tablet 100 mg  100 mg Oral Daily Dagher, Ramez, MD   100 mg at 06/30/24 0850            Labs and Imaging:  New results:   No new results over past 24 hours     Data this admission:  - CBC unremarkable  - BMP unremarkable  - TSH normal  - UDS negative   - Hgb   - Vit B12 1,707 (6/20/2024)  - Folate >40 (6/20/2024)  - Urinalysis unremarkable  - EKG (6/13/2024) Sinus rhythm; Cannot rule out Inferior infarct, age undetermined;   Cannot rule out Anterior infarct, age undetermined; Abnormal ECG. QTc 435ms      Mental Status Exam:      Oriented to:  Grossly Oriented  General:  Awake and  "Alert  Appearance:  appears stated age, Grooming is adequate, and Dressed in personal clothing  Behavior/Attitude:  Cooperative, Engaged, Easy to redirect, and Open  Eye Contact: Appropriate  Psychomotor: No evidence of tics, dystonia, or tardive dyskinesia  and gesticulations no catatonia present  Speech:  appropriate volume/tone, talkative, and expansive  Language: Fluent in English with appropriate syntax and vocabulary.  Mood:  \"much better\"  Affect:  congruent to mood   Thought Process: more linear, still circumstantial but redirectable  Thought Content: No SI/HI/AH/VH, does not appear to be responding to internal stimuli, and delusions; delusions of paranoia  Associations: questionable  Insight:  fair due to noticing her progress  Judgment:  good due to reasoning through aspects of her care, preferences, etc.   Impulse control: limited  Attention Span:  adequate for conversation  Concentration:  grossly intact  Recent and Remote Memory:  grossly intact, recalls details from last hospitalization  Fund of Knowledge: average  Muscle Strength and Tone: normal  Gait and Station: Normal      Psychiatric Assessment      Maribell Mendoza is a 74 year old female with possible previous psychiatric diagnosis of bipolar I disorder (1970s) who presented via EMS with psychosis in the context of recent travel. Most recent psychiatric hospitalization was in the 1970s, and had a hospitalization for medical rule out of current symptoms 6/13/2024-6/20. Significant symptoms on admission included paranoid delusions and verbal aggression. The MSE on admission was pertinent for labile affect, inattentive concentration, paranoia, and tangential thought process with flight of ideas. Biological contributions to mental health presentation include a possible past diagnosis of bipolar I disorder, and possible misuse of alcohol for insomnia. Psychological contributions to mental health presentation include lack of insight. Social factors " contributing to mental health presentation include the pt living alone for past 30 years following divorce from spouse. Protective factors include supportive family and participation in [employer] community.      In summary, the patient's reported symptoms of acute onset decreased need for sleep, paranoid delusions, and pressured speech are consistent with emmy with psychotic features. Given the presentation and past diagnosis of bipolar I disorder, the most likely diagnosis is decompensation of bipolar I disorder, mixed episodes.      She is benefitting from pharmacologic stabilization and establishment of longitudinal psychiatric care this admission. She is having some sedating side effects from the increased olanzapine, that is resolving, so will let her continue to adjust to the change.          Psychiatric Plan by Diagnosis      Today's changes:  - Continue with current care plan.     # primary psychosis vs encephalopathy unspecified   1. Medications:  - Continue PTA Olanzapine (7.5mg) -> Increased to 10mg (06/27/2024)  - PTA thiamine      2. Pertinent Labs/Monitoring:   - CBC WNL on 06/22  - Urinalysis WNL on 06/22.  - BMP WNL on 06/20  - MRI Brain with no acute abnormalities on 06/18  - Paraneoplastic panel pending  - Chest XR 6/19 showed no acute abnormalities   - EKG last on 6/13 showed  NSR  - B12 1,707 6/20 and folate elevated at >40.0 6/20  - Ammonia on 6/13 was wnl     3. Additional Plans:  - Patient will be treated in therapeutic milieu with appropriate individual and group therapies as described      Psychiatric Hospital Course:       Maribell Mendoza was admitted to [facility] as a voluntary patient  Medications:  PTA Olanzapine (7.5mg) was continued as well as pt's PTA nonpsychiatric medications   Olanzapine HS PO increased to 10mg (06/27/2024).  No PTA meds were held.   New medications started at the time of admission include none.      The risks, benefits, alternatives, and side effects were  discussed and understood by the patient and other caregivers.      Medical Assessment and Plan      Medical diagnoses to be addressed this admission:    #Hyperlipidemia  - Continue atorvastatin 20mg     #Osteoporosis  - 400-400mg  Glucosamine-chondroitin -> discontinued on 6/26/2024     #Hyperthoidism with Hashimoto's Thyroiditis  - Continue Levothroxine 88mcg     # Medical work up for psychosis  Medical admission on 06/13-06/20. Originally presented mild CMP changes: with elevated BUN 27.3, Cr 1.01, and anion gap of 17. Total michel slightly elevated at 1.3 as well as lipase of 63.  UA showed some ketones. BMP showed intermittent improvement and worsening over that hospital stay.   - Per neuro consult, recommended autoimmune work up, received a Paraneoplastic panel which was unremarkable, and a Brain MRI which showed no acute intercranial abnormality      Medical course: Patient was physically examined by the ED prior to being transferred to the unit and was found to be medically stable and appropriate for admission.      Consults:  none      Checklist      Legal Status: Voluntary          Safety Assessment:           Behavioral Orders   Procedures    Code 1 - Restrict to Unit    Routine Programming       As clinically indicated    Status 15       Every 15 minutes.         Risk Assessment:  Risk for harm is moderate-high.  Risk factors: family dynamics and impulsive  Protective factors: family      SIO: Not indicated at this time     Disposition: TBD. Disposition pending clinical stabilization, medication optimization and development of an appropriate discharge plan       Attestations    Niall Martin, MS3     I was present with the medical student who participated in the service and in the documentation of the note. I have verified the history and personally performed the physical exam and medical decision making. I agree with the assessment and plan of care as documented in the note.    This patient was seen and  discussed with my attending physician.    Rudy Hung MD  Psychiatry Resident Physician       Rudy Hung MD  Psychiatry Resident Physician     Attestation:  This patient has been seen and evaluated by me, Kizzy Hill MD.  I have discussed this patient with the house staff team including the resident and medical student and I agree with the findings and plan in this note.    I have reviewed today's vital signs, medications, labs and imaging. Kizzy Hill MD , PhD.

## 2024-07-03 NOTE — PROGRESS NOTES
"    [facility]        PATIENT'S NAME: Maribell Mendoza  PREFERRED NAME: Samantha  PRONOUNS:       MRN: [id number]  : 1950  ADDRESS: [address] [place] [id number]  ACCT. NUMBER:  [id number]  DATE OF SERVICE: 24  START TIME: 1:00 PM  END TIME: 3:00 PM  PREFERRED PHONE: [phone]   May we leave a program related message: Yes  EMERGENCY CONTACT: was obtained [phone] Tom Mendoza, son .  SERVICE MODALITY:  In-person    UNIVERSAL ADULT Mental Health DIAGNOSTIC ASSESSMENT    Identifying Information:  Patient is a 74 year old,    individual.  Patient was referred for an assessment by  [facility] .  Patient attended the session alone.    Chief Complaint:   The reason for seeking services at this time is: \" I am looking for help with my mental health problems and some assistance with life events \"   The problem(s) began over 50 years ago, it started when I was 20 years old. Patient has attempted to resolve these concerns in the past through therapy, psychiatry, group sessions and hospitalizations.  .    Social/Family History:  Patient reported they grew up in  [place] and when she was 2.5 years old move to [facility] and another move at at he age of 12 to [place], [place]., moved to [place]  .  They were raised by biological parents.  Parents stayed ..Both parents are .   Patient reported that their childhood was good, loving and supported..  Patient described their current relationships with family of origin as: Both parents are diseased. One step-brother, . One son, who is supportive.     The patient describes their cultural background as   and Jehovah's witness.  Cultural, Contextual, and socioeconomic factors do not affect the patient's access to services.  These factors will be addressed in the Preliminary Treatment plan.  Patient identified their preferred language to be English. Patient " reported they do not  need the assistance of an  or other support involved in therapy.     Patient reported had no significant delays in developmental tasks.   Patient's highest education level was college graduate. Patient identified the following learning problems: none reported.  Modifications will not be used to assist communication in therapy.  Patient reports they are able to understand written materials.    Patient reported the following relationship history .  Patient's current relationship status is  for 35 years.   Patient identified their sexual orientation as heterosexual.  Patient reported having one child(petra). Patient identified adult child, friends,  and [employer], as part of their support system.  Patient identified the quality of these relationships as stable and meaningful.     Patient's current living/housing situation involves staying in own home/apartment.  They live by herself and they report that housing is stable.     Patient is currently retired.  Patient reports their finances are obtained through  residential funds .  Patient does not identify finances as a current stressor.      Patient reported that they have been involved with the legal system.  Commitment court hearing, signed 7/3/2024 Patient denies being on probation / parole / under the jurisdiction of the court.    Patient's Strengths and Limitations:  Patient identified the following strengths or resources that will help them succeed in treatment: commitment to health and well being, exercise routine, friends / good social support, family support, intelligence, and motivation. Things that may interfere with the patient's success in treatment include: none identified.     Assessments:  The following assessments were completed by patient for this visit:  PHQ9:       7/3/2024    12:00 PM   PHQ-9 SCORE   PHQ-9 Total Score 5     GAD7:       8/31/2023    11:12 AM 7/3/2024    12:00 PM    OLGA LIDIA-7 SCORE   Total Score 0 (minimal anxiety)    Total Score 0 6     CAGE-AID:       7/3/2024    12:00 PM   CAGE-AID Total Score   Total Score 0     PROMIS 10-Global Health (all questions and answers displayed):       7/3/2024    12:00 PM   PROMIS 10   In general, would you say your health is: 4   In general, would you say your quality of life is: 5   In general, how would you rate your physical health? 4   In general, how would you rate your mental health, including your mood and your ability to think? 4   In general, how would you rate your satisfaction with your social activities and relationships? 3   In general, please rate how well you carry out your usual social activities and roles. (This includes activities at home, at work and in your community, and responsibilities as a parent, child, spouse, employee, friend, etc.) 5   To what extent are you able to carry out your everyday physical activities such as walking, climbing stairs, carrying groceries, or moving a chair? 5   In the past 7 days, how often have you been bothered by emotional problems such as feeling anxious, depressed, or irritable? 3   In the past 7 days, how would you rate your fatigue on average? 2   In the past 7 days, how would you rate your pain on average, where 0 means no pain, and 10 means worst imaginable pain? 1   Global Mental Health Score 15   Global Physical Health Score 17   PROMIS TOTAL - SUBSCORES 32     [place] Suicide Severity Rating Scale (Lifetime/Recent)      6/13/2024     9:57 PM 6/14/2024    12:46 PM 6/22/2024     7:22 PM 6/23/2024     3:53 PM 6/25/2024     1:35 AM   [place] Suicide Severity Rating (Lifetime/Recent)   Q1 Wish to be Dead (Lifetime) No    No   Q2 Non-Specific Active Suicidal Thoughts (Lifetime) No    No   Q1 Wished to be Dead (Past Month) 0-->no 0-->no 0-->no 0-->no 0-->no   Q2 Suicidal Thoughts (Past Month) 0-->no 0-->no 0-->no 0-->no 0-->no   Q6 Suicide Behavior (Lifetime)  0-->no 0-->no  0-->no    Level of Risk per Screen no risks indicated no risks indicated no risks indicated no risks indicated no risks indicated   Actual Attempt (Lifetime) N       Actual Attempt (Past 3 Months) N   N    Total Number of Actual Attempts (Past 3 Months) 0       Has subject engaged in non-suicidal self-injurious behavior? (Lifetime) N       Has subject engaged in non-suicidal self-injurious behavior? (Past 3 Months) N   N    Interrupted Attempts (Lifetime) N       Interrupted Attempts (Past 3 Months) N   N    Total Number of Interrupted Attempts (Past 3 Months) 0       Aborted or Self-Interrupted Attempt (Lifetime) N       Aborted or Self-Interrupted Attempt (Past 3 Months) N   N    Total Number of Aborted or Self-Interrupted Attempts (Past 3 Months) 0       Preparatory Acts or Behavior (Lifetime) N       Preparatory Acts or Behavior (Past 3 Months) N   N    Total Number of Preparatory Acts (Past 3 Months) 0       Calculated C-SSRS Risk Score (Lifetime/Recent) No Risk Indicated   No Risk Indicated        Personal and Family Medical History:  Patient   report a family history of mental health concerns.  Patient reports family history is not on file..     Patient does report Mental Health Diagnosis and/or Treatment.  Patient Patient reported the following previous diagnoses which include(s): an Anxiety Disorder, a Bipolar Disorder, and Depression.  Patient reported symptoms began over 50 years ago.   Patient has received mental health services in the past: therapy , psychiatry, and hospitalizations.  Psychiatric Hospitalizations: Last hospitalization was in [place], [place] 50 years ago.  Patient reports they are currently under a civil commitment with [facility].  Patient is not receiving other mental health services.  These include none.       Patient has had a physical exam to rule out medical causes for current symptoms.  Date of last physical exam was within the past year. Client was encouraged to follow up with PCP if  symptoms were to develop. The patient has a [facility] Provider, who is named Osiris Fernandes..  Patient reports no current medical and/or dental concerns.  Patient denies any issues with pain..   There are not significant appetite / nutritional concerns / weight changes.   Patient does not report a history of head injury / trauma / cognitive impairment.      Patient reports current meds as:   Current Facility-Administered Medications   Medication Dose Route Frequency Provider Last Rate Last Admin    acetaminophen (TYLENOL) tablet 650 mg  650 mg Oral Q4H PRN Dagher, Ramez, MD        alum & mag hydroxide-simethicone (MAALOX) suspension 30 mL  30 mL Oral Q4H PRN Dagher, Ramez, MD        atorvastatin (LIPITOR) tablet 20 mg  20 mg Oral Daily Dagher, Ramez, MD   20 mg at 07/03/24 0850    folic acid (FOLVITE) tablet 1 mg  1 mg Oral Daily Dagher, Ramez, MD   1 mg at 07/03/24 0850    hydrALAZINE (APRESOLINE) tablet 10 mg  10 mg Oral 4x Daily PRN Ramakrishna Cameron PA-C        levothyroxine (SYNTHROID/LEVOTHROID) tablet 88 mcg  88 mcg Oral Daily Dagher, Ramez, MD   88 mcg at 07/03/24 0850    melatonin tablet 3 mg  3 mg Oral At Bedtime PRN Dagher, Ramez, MD        multivitamin  with lutein (OCUVITE WITH LUTEIN) per capsule 1 capsule  1 capsule Oral Daily Dagher, Ramez, MD   1 capsule at 07/03/24 0850    OLANZapine (zyPREXA) tablet 2.5 mg  2.5 mg Oral TID PRN Dagher, Ramez, MD        Or    OLANZapine (zyPREXA) injection 2.5 mg  2.5 mg Intramuscular TID PRN Dagher, Ramez, MD        OLANZapine (zyPREXA) tablet 10 mg  10 mg Oral At Bedtime AbhilashRudy bermudez MD   10 mg at 07/02/24 2210    polyethylene glycol (MIRALAX) Packet 17 g  17 g Oral Daily PRN Dagher, Ramez, MD        thiamine (B-1) tablet 100 mg  100 mg Oral Daily Dagher, Ramez, MD   100 mg at 07/03/24 0850       Medication Adherence:  Patient reports  .  taking prescribed medications as prescribed.    Patient Allergies:    Allergies   Allergen Reactions     Alendronate Other (See Comments)     PN:  SWALLOWING    Sulfa Antibiotics Hives     PN: HIVES       Medical History:  No past medical history on file.      Current Mental Status Exam:   Appearance:  Appropriate    Eye Contact:  Fair   Psychomotor:  Normal       Gait / station:  no problem  Attitude / Demeanor: Cooperative  Friendly Pleasant  Speech      Rate / Production: Normal/ Responsive      Volume:  Soft  volume      Language:  intact  Mood:   Anxious   Affect:   Appropriate    Thought Content: Clear   Thought Process: Coherent       Associations: No loosening of associations  Insight:   Good   Judgment:  Intact   Orientation:  All  Attention/concentration: Fair    Substance Use:   Patient did not report a family history of substance use concerns; see medical history section for details.  Patient has not received chemical dependency treatment in the past.  Patient has not ever been to detox.      Patient is not currently receiving any chemical dependency treatment. Patient reported the following problems as a result of their substance use:   None reported .    Patient denies using alcohol.  Patient denies using tobacco.  Patient denies using cannabis.  Patient reports using caffeine 2 times per day and drinks 1 at a time. Patient started using caffeine at age 15.  Patient reports using/abusing the following substance(s). Patient reported no other substance use.     Substance Use: No symptoms    Based on the negative CAGE score and clinical interview there  are not indications of drug or alcohol abuse.    Significant Losses / Trauma / Abuse / Neglect Issues:   Patient   did not serve in the .  There are indications or report of significant loss, trauma, abuse or neglect issues related to: are no indications and client denies any losses, trauma, abuse, or neglect concerns.  Concerns for possible neglect are not present.     Safety Assessment:   Patient denies current homicidal ideation and  behaviors.  Patient denies current self-injurious ideation and behaviors.    Patient denied risk behaviors associated with substance use.   Patient denies any high risk behaviors associated with mental health symptoms.  Patient reports the following current concerns for their personal safety: None.  Patient reports there no firearms in the home..    History of Safety Concerns:  Patient denied a history of homicidal ideation.     Patient denied a history of personal safety concerns.    Patient denied a history of assaultive behaviors.    Patient denied a history of sexual assault behaviors.     Patient denied a history of risk behaviors associated with substance use.  Patient denies any history of high risk behaviors associated with mental health symptoms.  Patient reports the following protective factors: desire to better manage her mental health symptoms, strong bond to family unit, lives in a responsibly safe and stable environment, able to access care without barriers, sense of importance of health and wellness, help seeking and absence of financial problems.         Risk Plan:  See Recommendations for Safety and Risk Management Plan    Review of Symptoms per patient report:   Depression: No symptoms, Change in sleep, Lack of interest, Change in energy level, Difficulties concentrating, Change in appetite, Psychomotor slowing or agitation, Ruminations, Irritability, Feeling sad, down, or depressed, Withdrawn, and Anger outbursts  Jessenia:  No Symptoms  Psychosis: Delusions    Anxiety: Excessive worry, Nervousness, Social anxiety, Sleep disturbance, Psychomotor agitation, Ruminations, Poor concentration, Irritability, and Anger outbursts  Panic:  No symptoms  Post Traumatic Stress Disorder:  No Symptoms   Eating Disorder: No Symptoms  ADD / ADHD:  No symptoms  Conduct Disorder: No symptoms  Autism Spectrum Disorder: No symptoms  Obsessive Compulsive Disorder: No Symptoms    Patient reports the following compulsive  behaviors and treatment history: none reported.      Diagnostic Criteria:   Generalized Anxiety Disorder  A. Excessive anxiety and worry about a number of events or activities (such as work or school performance).   B. The person finds it difficult to control the worry.  C. Select 3 or more symptoms (required for diagnosis). Only one item is required in children.   - Restlessness or feeling keyed up or on edge.    - Being easily fatigued.    - Difficulty concentrating or mind going blank.    - Irritability.    - Muscle tension.  Bipolar I Manic Episode  **Must meet all criteria below (A-F) for Dx of Bipolar I Disorder**  MANIC EPISODE - At least one lifetime manic episode is required for the dx of Bipolar I Disorder as evidenced by present symptoms or by history  A. A distinct period of abnormally and persistently elevated, expansive, or irritable mood, lasting at least 1 week (or any duration if hospitalization is necessary).   B. During the period of mood disturbance, three (or more) of the following symptoms (four if the mood is only irritable) have persisted and have been present to a significant degree:   - more talkative than usual or pressure to keep talking    - flight of ideas or subjectivie experience that thoughts are racing   - distractibility   - increase in goal-directed activity Major Depressive Disorder  CRITERIA (A-C) REPRESENT A MAJOR DEPRESSIVE EPISODE - SELECT THESE CRITERIA  A) Recurrent episode(s) - symptoms have been present during the same 2-week period and represent a change from previous functioning 5 or more symptoms (required for diagnosis)   - Depressed mood. Note: In children and adolescents, can be irritable mood.     - Diminished interest or pleasure in all, or almost all, activities.    - Psychomotor activity agitation.    - Fatigue or loss of energy.    - Diminished ability to think or concentrate, or indecisiveness.     Functional Status:  Patient reports the following functional  impairments:  organization, relationship(s), self-care, and social interactions.     Programmatic care:  Current LOCUS was assigned and patient needs the following level of care based on score 17  .    Clinical Summary:  1. Psychosocial, Cultural and Contextual Factors: Worsened mental health conditions, other life stressors, willingness to seek help and motivated to make changes.  2. Principal DSM5 Diagnoses  (Sustained by DSM5 Criteria Listed Above):   296.44 Bipolar I Disorder Current or Most Recent Episode Manic, with psycholtic features  296.32 (F33.1) Major Depressive Disorder, Recurrent Episode, Moderate _ and With anxious distress  300.02 (F41.1) Generalized Anxiety Disorder.  3. Other Diagnoses that is relevant to services:   NA.  4. Provisional Diagnosis:  NA  5. Prognosis: Return to Baseline Functioning.  6. Likely consequences of symptoms if not treated: patient's ongoing symptoms are more than likely to get worse and experience a decreased daily in functioning and may require a higher level of care..  7. Client strengths include:  educated, goal-focused, has a previous history of therapy, intelligent, motivated, open to suggestions / feedback, and support of family, friends and providers .     Recommendations:     1. Plan for Safety and Risk Management:   Safety and Risk: Recommended that patient call 911 or go to the local ED should there be a change in any of these risk factors..          Report to child / adult protection services was NA.     2. Patient's hasn't not identified any aiden / Hinduism / spiritual influences that will need to be incorporated into care.     3. Initial Treatment will focus on:   Depressed Mood -    Anxiety -   .     4. Resources/Service Plan:    services are not indicated.   Modifications to assist communication are not indicated.   Additional disability accommodations are not indicated.      5. Collaboration:   Collaboration / coordination of treatment will be  "initiated with the following  support professionals: Targeted Case Management (TCM).      6.  Referrals:   The following referral(s) will be initiated: Outpatient Mental Health Therapy Group: IOP for Seniors 55+ and Psychiatry for Medications Management .       A Release of Information has been obtained for the following: Targeted Case Management (TCM).     Clinical Substantiation/medical necessity for the above recommendations: Patient is a 74-year-old  and Worship heterosexual female with one adult son who presents with a history of MDD, OLGA LIDIA and Bipolar 1 Disorder. Patient is currently IP at [facility]. Patient reports an over 50 yeas of history of Depression and Anxiety and upon discharge, she is interested in obtaining the referrals to: Psychiatrist for Medications Management and IOP program for Seniors 55+ at [facility] due to \" significant behavioral change, anxiety, depression and worsening psychosocial stress\". Referrals submitted to the Patients' Navigators team. Patient's acute suicide risk was determined to be low due to the following factors: denial of current suicidal ideations and past suicidal behaviors. Patient is not currently under the influence of alcohol or illicit substances, denies experiencing command hallucinations, and has no direct access to firearms. Patient's acute risk could be higher if noncompliant with treatment plan, medications, follow-up appointments or using illicit substances or alcohol. Protective factors include: Desire to better manage her mental health symptoms, strong bond to family unit, lives in a responsibly safe and stable environment, able to access care without barriers, sense of importance of health and wellness, help seeking and absence of financial problems. Patient instructed to present to her nearest emergency room if symptoms deteriorate.    7. FELIPA:    FELIPA:  Discussed the general effects of drugs and alcohol on health and " well-being. Provider gave patient printed information about the effects of chemical use on their health and well being. Recommendations:  Abstinence .     8. Records:   These were reviewed at time of assessment.   Information in this assessment was obtained from the medical record and  provided by patient who is a good historian. Patient will have open access to their mental health medical record.    9.   Interactive Complexity: NA    10. Safety Plan:   Julio Safety Plan      Creation Date: 7/1/24       Step 1: Warning signs:    Warning Signs    Speeding through things too fast    Not eating on a regular schedule    Not taking prescribed medication/supplements on time    Not sleeping on a regular schedule    Watching scary movies/TV before bedtime    Watching more movies/TV than normal      Step 2: Internal coping strategies - Things I can do to take my mind off my problems without contacting another person:    Strategies    Listen to my phone for screen time limits    Having a clock near my bed so I don't have to check my phone    Having regular schedule of sleep, eating, medication, swimming    Snowshoeing    Working out      Step 3: People and social settings that provide distraction:    Name Contact Information    Neighbor Lynn     Other neighbors     Cousins     Randolph Herman        Places    Going to the     Outside    Rastafarian      Step 4: People whom I can ask for help during a crisis:    Name Contact Information    Any of my cousins     Dre Potter     Friends from [employer]       Step 5: Professionals or agencies I can contact during a crisis:    Clinician/Agency Name Phone Emergency Contact    [facility] Crisis Line [phone]       Suicide Prevention Lifeline Phone: Call or Text 899  Crisis Text Line: Text HOME to 649540     Step 6: Making the environment safer (plan for lethal means safety):   Did not identify any lethal methods     Optional: What is most important to me and worth  living for?:   My family, traveling, being active.      Julio Safety Plan. Nelly Rubi and Juwan Garza. Used with permission of the authors.           Provider Name/ Credentials:  Ian Corado PhD, [facility], [facility].   [facility]    Mental Health & Addiction Clinical Services  [address] [place] [address], [address] [address]   [email or web address]  Office: [phone] Fax: [phone]    July 3, 2024

## 2024-07-04 PROCEDURE — 250N000013 HC RX MED GY IP 250 OP 250 PS 637

## 2024-07-04 PROCEDURE — G0177 OPPS/PHP; TRAIN & EDUC SERV: HCPCS

## 2024-07-04 PROCEDURE — 124N000002 HC R&B MH UMMC

## 2024-07-04 PROCEDURE — 99221 1ST HOSP IP/OBS SF/LOW 40: CPT | Performed by: NURSE PRACTITIONER

## 2024-07-04 RX ADMIN — THIAMINE HCL TAB 100 MG 100 MG: 100 TAB at 08:51

## 2024-07-04 RX ADMIN — Medication 1 CAPSULE: at 08:51

## 2024-07-04 RX ADMIN — FOLIC ACID 1 MG: 1 TABLET ORAL at 08:54

## 2024-07-04 RX ADMIN — OLANZAPINE 10 MG: 10 TABLET, FILM COATED ORAL at 22:19

## 2024-07-04 RX ADMIN — ATORVASTATIN CALCIUM 20 MG: 20 TABLET, FILM COATED ORAL at 08:54

## 2024-07-04 RX ADMIN — LEVOTHYROXINE SODIUM 88 MCG: 88 TABLET ORAL at 08:51

## 2024-07-04 ASSESSMENT — ACTIVITIES OF DAILY LIVING (ADL)
ADLS_ACUITY_SCORE: 48
ORAL_HYGIENE: INDEPENDENT
ADLS_ACUITY_SCORE: 48
ADLS_ACUITY_SCORE: 48
HYGIENE/GROOMING: BATH
ADLS_ACUITY_SCORE: 48
HYGIENE/GROOMING: INDEPENDENT
ADLS_ACUITY_SCORE: 48
LAUNDRY: WITH SUPERVISION
ADLS_ACUITY_SCORE: 48
DRESS: INDEPENDENT
ADLS_ACUITY_SCORE: 48
ORAL_HYGIENE: INDEPENDENT
DRESS: INDEPENDENT

## 2024-07-04 NOTE — PLAN OF CARE
BEH IP Unit Acuity Rating Score (UARS)  Patient is given one point for every criteria they meet.    CRITERIA SCORING   On a 72 hour hold, court hold, committed, stay of commitment, or revocation. 1    Patient LOS on BEH unit exceeds 20 days. 0  LOS: 9   Patient under guardianship, 55+, otherwise medically complex, or under age 11. 1   Suicide ideation without relief of precipitating factors. 0   Current plan for suicide. 0   Current plan for homicide. 0   Imminent risk or actual attempt to seriously harm another without relief of factors precipitating the attempt. 0   Severe dysfunction in daily living (ex: complete neglect for self care, extreme disruption in vegetative function, extreme deterioration in social interactions). 1   Recent (last 7 days) or current physical aggression in the ED or on unit. 0   Restraints or seclusion episode in past 72 hours. 0   Recent (last 7 days) or current verbal aggression, agitation, yelling, etc., while in the ED or unit. 0   Active psychosis. 0   Need for constant or near constant redirection (from leaving, from others, etc).  0   Intrusive or disruptive behaviors. 0   Patient requires 3 or more hours of individualized nursing care per 8-hour shift (i.e. for ADLs, meds, therapeutic interventions). 0   TOTAL 3

## 2024-07-04 NOTE — PLAN OF CARE
"Pt is visible in the milieu. She is pleasant and cooperative, medication compliant. Pt comes out for meals and is social with peers by the dining area. Pt nutrition, hydration and hygiene is adequate. She took a shower in the morning. Pt was observed on the exercise bike this shift. She said that she is excited for tomorrow's discharge, She endorsed minimal anxiety at 2/10, saying it is fleeting and is easily managed  with going to her room and doing some exercises there. Pt denies depression, hallucinations and SI.       Problem: Adult Behavioral Health Plan of Care  Goal: Patient-Specific Goal (Individualization)  Description: You can add care plan individualizations to a care plan. Examples of Individualization might be:  \"Parent requests to be called daily at 9am for status\", \"I have a hard time hearing out of my right ear\", or \"Do not touch me to wake me up as it startles  me\".  Outcome: Progressing  Goal: Adheres to Safety Considerations for Self and Others  Outcome: Progressing  Intervention: Develop and Maintain Individualized Safety Plan  Recent Flowsheet Documentation  Taken 7/4/2024 1200 by Rosa Baez RN  Safety Measures: safety rounds completed  Goal: Optimized Coping Skills in Response to Life Stressors  Outcome: Progressing  Intervention: Promote Effective Coping Strategies  Recent Flowsheet Documentation  Taken 7/4/2024 1200 by Rosa Baez RN  Supportive Measures:   active listening utilized   positive reinforcement provided     Problem: Sleep Disturbance  Goal: Adequate Sleep/Rest  Outcome: Progressing     Problem: Anxiety  Goal: Anxiety Reduction or Resolution  Outcome: Progressing  Intervention: Promote Anxiety Reduction  Recent Flowsheet Documentation  Taken 7/4/2024 1200 by Rosa Baez RN  Supportive Measures:   active listening utilized   positive reinforcement provided     Problem: Depression  Goal: Improved Mood  Outcome: Progressing  Intervention: " Monitor and Manage Depressive Symptoms  Recent Flowsheet Documentation  Taken 7/4/2024 1200 by Rosa Baez, RN  Supportive Measures:   active listening utilized   positive reinforcement provided     Problem: Suicidal Behavior  Goal: Suicidal Behavior is Absent or Managed  Outcome: Progressing     Problem: Fall Injury Risk  Goal: Absence of Fall and Fall-Related Injury  Outcome: Progressing   Goal Outcome Evaluation:    Plan of Care Reviewed With: patient

## 2024-07-04 NOTE — PLAN OF CARE
Problem: Sleep Disturbance  Goal: Adequate Sleep/Rest  Outcome: Progressing    Pt appears to have slept for 7 hours. Pt did not complain of pain or discomfort. No PRN medication was given. Blood pressure  was 136/80, and  pt was asymptomatic during shift. 15 minutes safety checks were in place. Staff will continue to offer support to pt.

## 2024-07-04 NOTE — CARE PLAN
Rehab Group    Start time: 2000  End time: 2100  Patient time total: 50 minutes    attended full group     #3 attended   Group Type: occupational therapy   Group Topic Covered: cognitive activities, coping skills, self-care, self-esteem, and social skills       Group Session Detail:  Education and group discussion provided on the mental health benefits of routine utilization of positive self affirmations and gratitude with hands on activity of completing an Affirmation or Gratitude Calendar for concentration, organization, building self esteem, coping, mood stabilization, reality based activity, fostering hope for a sustainable recovery, self-care, and resiliency.        Patient Response/Contribution:  socially appropriate and actively engaged       Patient Detail:  Pt was pleasant and fully engaged in the group activity.  She was very invested in filling in an affirmation for each box of her calendar.  Pt was social with therapist and peers.  She relayed that she is hopeful for discharge at the end of the week.  Pt demonstrated a good sense of humor and brightens with social interaction.            Train & Education Service Per Session 45 + Minutes () OT Group code    Patient Active Problem List   Diagnosis    Chronic pain of left knee    Psychosis (H)    Acute encephalopathy    Paranoia (H)    Disorganized behavior    Psychosis, unspecified psychosis type (H)    Psychotic episode (H)

## 2024-07-04 NOTE — PLAN OF CARE
Problem: Anxiety  Goal: Anxiety Reduction or Resolution  Outcome: Progressing  Intervention: Promote Anxiety Reduction  Recent Flowsheet Documentation  Taken 7/4/2024 1800 by Keny Graves RN  Family/Support System Care:   involvement promoted   presence promoted     Problem: Psychotic Signs/Symptoms  Goal: Improved Behavioral Control (Psychotic Signs/Symptoms)  Outcome: Progressing   Goal Outcome Evaluation:    Plan of Care Reviewed With: patient      No abnormal behavior observed this shift. Patient present in the  milieu majority of the shift socializing with peers and watching TV. Affect is bright, mood is calm, and patient is  pleasant and cooperative with cares. She denies all psych symptoms and contracts for safety.  B/P was 145/82 at the beginning of shift, but dropped to 136/78 as shift progressed, with no intervention provided. Patient reported mild anxiety, but linked it to her discharge tomorrow. No psychosis reported and staff will continue to monitor and encourage.

## 2024-07-05 VITALS
SYSTOLIC BLOOD PRESSURE: 145 MMHG | OXYGEN SATURATION: 100 % | BODY MASS INDEX: 29.33 KG/M2 | TEMPERATURE: 97.5 F | RESPIRATION RATE: 16 BRPM | HEART RATE: 80 BPM | WEIGHT: 165.6 LBS | DIASTOLIC BLOOD PRESSURE: 81 MMHG

## 2024-07-05 PROCEDURE — 250N000013 HC RX MED GY IP 250 OP 250 PS 637

## 2024-07-05 PROCEDURE — 99238 HOSP IP/OBS DSCHRG MGMT 30/<: CPT | Mod: GC | Performed by: PSYCHIATRY & NEUROLOGY

## 2024-07-05 RX ORDER — OLANZAPINE 10 MG/1
10 TABLET ORAL AT BEDTIME
Qty: 30 TABLET | Refills: 0 | Status: SHIPPED | OUTPATIENT
Start: 2024-07-05

## 2024-07-05 RX ORDER — HYDRALAZINE HYDROCHLORIDE 10 MG/1
10 TABLET, FILM COATED ORAL 4 TIMES DAILY PRN
Qty: 20 TABLET | Refills: 0 | Status: SHIPPED | OUTPATIENT
Start: 2024-07-05

## 2024-07-05 RX ADMIN — THIAMINE HCL TAB 100 MG 100 MG: 100 TAB at 08:39

## 2024-07-05 RX ADMIN — FOLIC ACID 1 MG: 1 TABLET ORAL at 08:39

## 2024-07-05 RX ADMIN — Medication 1 CAPSULE: at 08:39

## 2024-07-05 RX ADMIN — LEVOTHYROXINE SODIUM 88 MCG: 88 TABLET ORAL at 08:39

## 2024-07-05 RX ADMIN — ATORVASTATIN CALCIUM 20 MG: 20 TABLET, FILM COATED ORAL at 08:39

## 2024-07-05 ASSESSMENT — ACTIVITIES OF DAILY LIVING (ADL)
ORAL_HYGIENE: INDEPENDENT
ADLS_ACUITY_SCORE: 48
ADLS_ACUITY_SCORE: 48
LAUNDRY: WITH SUPERVISION
ADLS_ACUITY_SCORE: 48
HYGIENE/GROOMING: INDEPENDENT
ADLS_ACUITY_SCORE: 48
DRESS: INDEPENDENT;STREET CLOTHES
ADLS_ACUITY_SCORE: 48

## 2024-07-05 NOTE — DISCHARGE SUMMARY
"                                                                                                                 ----------------------------------------------------------------------------------------------------------  [facility]   Psychiatric Discharge Summary      Maribell Mendoza MRN# [id number]   Age: 74 year old YOB: 1950     Date of Admission:  6/25/2024  Date of Discharge:  7/5/2024  Admitting Physician:  Kizzy Hill MD, PhD  Discharge Physician:  Kizzy Hill MD, PhD  **Update date of service and hit refresh**    This document serves as a transfer of care to Maribell Mendoza's outpatient providers.     Events Leading to Hospitalization:     Maribell Mendoza is a 74 year old female with a history of Bipolar Disorder admitted from the ER on 06/25/2024 due to concern for emmy and psychosis in the context of  recent travel  and a recent admission on the medical unit (06/13-06/20) for acute encephalopathy of undetermined etiology where she received a neurological work up which was inconclusive.     ED/Hospital Course:     Maribell Mendoza was medically cleared for admission to inpatient psychiatric unit. In the ED, patient received PRN 2.5mg olanzapine PO for psychosis with agitation (6/23 1049).    Patient interview:  Maribell was interviewed by the psychiatry team in her room. Pt said there were \"a bunch of crazy circumstances.\" Pt moves on to talking about how her son didn't understand what was going on and then quickly changed subjects to her parents and their careers. When asked about what her son didn't understand, pt said \"sure,\" and then proceeded to talk about her mother again. Pt talks about how her mother only felt comfortable living on the fourth floor of an apartment. When asked to refocus on more recent events, pt repeatedly says \"sure,\" and proceeds to talk about her son and his job on an airline. Pt says that her son was trying to help " "her by protecting her from her mother.      Pt said her hospitalization was \"not great\" because she was upset with her son d/t him \"blocking me from my home.\" Pt said that she was not sleeping consistently. She said that the zyprexa started in the hospital helped with sleep. When asked why her son brought her back to the hospital, pt talks about packing clothes, housing necessities, wanting to swim, a story about her not being able to swim.      Pt said what is critical is for her to be stabilized on medications. Pt is concerned about medications putting her to sleep all day. Pt says every time she tries to understand the medical orders something \"blocks her\" and she can't understand anymore.      Pt said she was hospitalized twice in her early twenties. Pt said that the thorazine caused horrific sunburn and that lithium did not help. Pt said she has the worst esophageal problem from fosamax. Pt said Prilosec caused heartburn. Pt said apples and honey are connected to her brother's death. Pt said her mother  and then her cat . Pt said she was never on any psychiatric medication consistently after her hospitalization. Pt said she has had years of depression in the past. Pt said she would like to get back home with her \"june blanket, which is her blanket\" which reminds her of her cat. Pt recently bought a new iPad from [employer]. Pt is focused on going to Confucianism\"    See H&P by Kizzy Hill MD, PhD on 2024 for additional details.      Diagnoses:   Primary Psychiatric Diagnosis  Bipolar I disorder, emmy  with psychotic features    Secondary Psychiatric Diagnoses  Encephalopathy, unspecified     Psychiatric Assessment:   Maribell Mendoza is a 74 year old female with possible previous psychiatric diagnosis of bipolar I disorder () who presented via EMS with psychosis in the context of recent travel. Most recent psychiatric hospitalization was in the , and had a hospitalization for medical rule out " of current symptoms 6/13/2024-6/20. Significant symptoms on admission included paranoid delusions and verbal aggression. The MSE on admission was pertinent for labile affect, inattentive concentration, paranoia, and tangential thought process with flight of ideas. Biological contributions to mental health presentation include a possible past diagnosis of bipolar I disorder, and possible misuse of alcohol for insomnia. Psychological contributions to mental health presentation include lack of insight. Social factors contributing to mental health presentation include the pt living alone for past 30 years following divorce from spouse. Protective factors include supportive family and participation in [place] community.      In summary, the patient's reported symptoms of acute onset decreased need for sleep, paranoid delusions, and pressured speech are consistent with emmy with psychotic features. Given the presentation and past diagnosis of bipolar I disorder, the most likely diagnosis is decompensation of bipolar I disorder, mixed episodes.      She is benefitting from pharmacologic stabilization and establishment of longitudinal psychiatric care this admission. She is having some sedating side effects from the increased olanzapine, that is resolving, so will let her continue to adjust to the change.      Psychiatric Hospital Course:     Maribell Mendoza was admitted to [facility] as a voluntary patient  Medications:  PTA Olanzapine (7.5mg) was continued as well as pt's PTA nonpsychiatric medications   Olanzapine HS PO increased to 10mg (06/27/2024).  No PTA meds were held.   New medications started at the time of admission include none.      The risks, benefits, alternatives, and side effects were discussed and understood by the patient and other caregivers.    Behaviors: The patient was safe and appropriate and did not require chemical/physical restraints during admission. She was cooperative with cares, had good  "group attendance, and was visible in the milieu.  Change in psychiatric symptoms: Over the course of this hospitalization the patient's symptoms of paranoia and verbal aggression have improved.  Collateral information was obtained from her son Antonio and notable for acute onset of symptoms, including irritability, paranoia (barricaded herself with shoes and other objects, unplugged electronics, stopped taking meds because she didn't want to be \"experimented on\"), and did not appear to be groomed, eating sufficiently or taking medications.   Maribell was released to home. At the time of discharge she was determined to not be a danger to herself or others.     Risk Assessment:      Today Maribell Mendoza denies suicidal or homicidal ideations. Patient has notable risk factors for self-harm, including psychosis. However, risk is mitigated by commitment to family, absence of past attempts, and history of seeking help when needed. Therefore, based on all available evidence including the factors cited above, she does not appear to be at imminent risk for self-harm, does not meet criteria for a 72-hr hold, and therefore remains appropriate for ongoing outpatient level of care. Additional steps taken to minimize risk include: medication optimization, close psychiatric follow up and provision of crisis resources. Voluntary referral for IOP was offered, she accepted this offer.     Psychiatric Examination:     Mental Status Exam:  Oriented to:  Grossly Oriented  General:  Awake and Alert  Appearance:  appears stated age and Grooming is adequate  Behavior/Attitude:  Calm, Cooperative, Engaged, and Easy to redirect  Eye Contact: Appropriate  Psychomotor: No evidence of tics, dystonia, or tardive dyskinesia  no catatonia present  Speech:  appropriate volume/tone and talkative  Language: Fluent in English with appropriate syntax and vocabulary.  Mood:  \"well\"  Affect:  appropriate and congruent with mood  Thought Process:  linear and " coherent  Thought Content:   No SI/HI/AH/VH; No apparent delusions  Associations:  intact  Insight:  fair  Judgment:  fair  Impulse control: good  Attention Span:  grossly intact  Concentration:  grossly intact  Recent and Remote Memory:  not formally assessed  Fund of Knowledge: average  Muscle Strength and Tone: normal  Gait and Station: Normal     Medical Hospital Course:   Maribell Mendoza was medically cleared by the ED prior to admission to the unit. PTA medications were continued on admission.     Medical Diagnoses addressed:    #Hypertension  BP elevated in 140s-150s, at times 170s-180s. Medicine consulted and ordered hydralazine PRN.     Consults: Medicine team evaluated the patient for high blood pressure (187/80). Determined high reading due to acute stress d/t environment, but was prescribed hydralazine PRN for blood pressure > 175/105.     Labs were notable for the following:  - CBC unremarkable  - BMP unremarkable  - TSH normal  - UDS negative   - Hgb   - Vit B12 1,707 (6/20/2024)  - Folate >40 (6/20/2024)  - Urinalysis unremarkable  - EKG (6/13/2024) Sinus rhythm; Cannot rule out Inferior infarct, age undetermined;   Cannot rule out Anterior infarct, age undetermined; Abnormal ECG. QTc 435ms     Discharge Medications:     Current Discharge Medication List        CONTINUE these medications which have NOT CHANGED    Details   atorvastatin (LIPITOR) 20 MG tablet Take 20 mg by mouth daily      fish oil-omega-3 fatty acids 1000 MG capsule Take 2 g by mouth daily      folic acid (FOLVITE) 1 MG tablet Take 1 tablet (1 mg) by mouth daily  Qty: 30 tablet, Refills: 0    Associated Diagnoses: Acute encephalopathy      glucosamine-chondroitin 500-400 MG CAPS per capsule Take 1 capsule by mouth daily      levothyroxine (SYNTHROID/LEVOTHROID) 88 MCG tablet Take 88 mcg by mouth daily      multivitamin  with lutein (OCUVITE WITH LUTEIN) CAPS per capsule Take 1 capsule by mouth daily      !! OLANZapine (ZYPREXA) 2.5 MG  tablet Take 1 tablet (2.5 mg) by mouth 2 times daily as needed (agitation)  Qty: 60 tablet, Refills: 0    Associated Diagnoses: Acute encephalopathy      !! OLANZapine (ZYPREXA) 7.5 MG tablet Take 1 tablet (7.5 mg) by mouth at bedtime  Qty: 30 tablet, Refills: 0    Associated Diagnoses: Acute encephalopathy      thiamine (B-1) 100 MG tablet Take 1 tablet (100 mg) by mouth daily  Qty: 30 tablet, Refills: 0    Associated Diagnoses: Acute encephalopathy      TURMERIC PO Take 1 capsule by mouth daily       !! - Potential duplicate medications found. Please discuss with provider.           Discharge Plan:   Medications as above    2. Health Care Follow-up:     Primary Care Appointment: Wednesdsay July 17, 2024 at 9:15 AM  Provider: Dr. Osiris Fernandes MD  Location: [facility]  Address: [address] [place] [id number]  Phone: [phone]     Appointment: Psychiatry   Date/time: Monday July 22nd, 2024 @ 10:00 AM In person  Provider:  Dary ZHANG  Address: [facility] [address] [place] [address]  Phone: [phone]  Fax: [phone]   Note:   This is a 75 minute appointment. Intake paperwork to be completed beforehand will be sent to [email or web address]. Please arrive 30 minutes early if you prefer to complete paperwork in person.   Follow up: Monday August 26th, 2024 @ 10:00 AM In person    Endocrinology Appointment: Thursday July 25, 2024 at 1:05 PM  Provider: Dr. Jolene Bruce MD  Location: [facility] [place] [address]  Address: [address], [place] [address]  Phone: [phone]     Attestations:     Resident with med student: Niall Martin, MS3  [facility] Medical Student     I was present with the medical student who participated in the service and in the documentation of the note.  I have verified the history and personally performed the physical exam and medical decision making. I  agree with the assessment and plan of care as documented in the note.    This patient was seen and discussed with my attending physician.  Rudy Hung  Psychiatry Resident Physician     Attestation:  This patient has been seen and evaluated by me, Kizzy Hill MD.  I have discussed this patient with the house staff team including the resident and medical student and I agree with the findings and plan in this note.    I have reviewed today's vital signs, medications, labs and imaging. Kizzy Hill MD , PhD.

## 2024-07-05 NOTE — PLAN OF CARE
"Pt discharged as per order.  Pt denied SI.  Reviewed discharge paperwork with pt.  Pt pleasant and cooperative.  Pt discharged with all belongings, discharge paperwork, and ordered discharge medications.  Pts son picked pt up for discharge.    Problem: Adult Behavioral Health Plan of Care  Goal: Plan of Care Review  Outcome: Adequate for Care Transition  Goal: Patient-Specific Goal (Individualization)  Description: You can add care plan individualizations to a care plan. Examples of Individualization might be:  \"Parent requests to be called daily at 9am for status\", \"I have a hard time hearing out of my right ear\", or \"Do not touch me to wake me up as it startles  me\".  Outcome: Adequate for Care Transition  Goal: Adheres to Safety Considerations for Self and Others  Outcome: Adequate for Care Transition  Intervention: Develop and Maintain Individualized Safety Plan  Recent Flowsheet Documentation  Taken 7/5/2024 1342 by Arabella Bolden RN  Safety Measures: safety rounds completed  Goal: Absence of New-Onset Illness or Injury  Outcome: Adequate for Care Transition  Intervention: Identify and Manage Fall Risk  Recent Flowsheet Documentation  Taken 7/5/2024 1342 by Arabella Bolden RN  Safety Measures: safety rounds completed  Goal: Optimized Coping Skills in Response to Life Stressors  Outcome: Adequate for Care Transition  Goal: Develops/Participates in Therapeutic [place] to Support Successful Transition  Outcome: Adequate for Care Transition     Problem: Sleep Disturbance  Goal: Adequate Sleep/Rest  Outcome: Adequate for Care Transition     Problem: Anxiety  Goal: Anxiety Reduction or Resolution  Outcome: Adequate for Care Transition     Problem: Depression  Goal: Improved Mood  Outcome: Adequate for Care Transition     Problem: Psychotic Signs/Symptoms  Goal: Improved Behavioral Control (Psychotic Signs/Symptoms)  Outcome: Adequate for Care Transition  Goal: Optimal Cognitive Function (Psychotic " Signs/Symptoms)  Outcome: Adequate for Care Transition  Goal: Increased Participation and Engagement (Psychotic Signs/Symptoms)  Outcome: Adequate for Care Transition  Goal: Improved Mood Symptoms (Psychotic Signs/Symptoms)  Outcome: Adequate for Care Transition  Goal: Improved Psychomotor Symptoms (Psychotic Signs/Symptoms)  Outcome: Adequate for Care Transition  Intervention: Manage Psychomotor Movement  Recent Flowsheet Documentation  Taken 7/5/2024 9712 by Arabella Bolden RN  Activity (Behavioral Health): up ad tee  Goal: Decreased Sensory Symptoms (Psychotic Signs/Symptoms)  Outcome: Adequate for Care Transition  Goal: Improved Sleep (Psychotic Signs/Symptoms)  Outcome: Adequate for Care Transition  Goal: Enhanced Social, Occupational or Functional Skills (Psychotic Signs/Symptoms)  Outcome: Adequate for Care Transition     Problem: Pain Acute  Goal: Optimal Pain Control and Function  Outcome: Adequate for Care Transition     Problem: Suicidal Behavior  Goal: Suicidal Behavior is Absent or Managed  Outcome: Adequate for Care Transition     Problem: Fall Injury Risk  Goal: Absence of Fall and Fall-Related Injury  Outcome: Adequate for Care Transition   Goal Outcome Evaluation:

## 2024-07-05 NOTE — PLAN OF CARE
Problem: Sleep Disturbance  Goal: Adequate Sleep/Rest  Outcome: Progressing   Patient appears sleeping most of the shift. No complaints of pain and discomfort. Patient continues on q15 minutes safety checks, no behavioral issues noted. Patient on PRN BP medications VS BP (!) 152/70 (BP Location: Left arm, Patient Position: Sitting)   Pulse 79   Temp (!) 96.7  F (35.9  C) (Temporal)   Resp 16   Wt 75.1 kg (165 lb 9.6 oz)   SpO2 100%   BMI 29.33 kg/m   No PRN BP medication needed. Patient quiet, bright upon approach. Will continue to monitor the patient and provide therapeutic intervention as needed. Will continue with current plan of care. Notify MD with any concerns. The patient had 6.5 total hours of sleep this shift.

## 2024-07-05 NOTE — CARE PLAN
Rehab Group  Start time: 1615  End time: 1715  Patient time total: 45 minutes    attended full group    #6 attended   Group Type: OT Clinic   Group Topic Covered: activity therapy       Group Session Detail:  OT clinic group provides an opportunity for individual-based goal directed activity within a group setting - allowing for interaction and socialization.  Hands-on task work help to build/restore/or maintain skills such as: focus, concentration, decision making, and problem solving.  Patients are encouraged to carry over potential new interests/hobbies learned in group following discharge.       Patient Response/Contribution:  positive affect, cooperative with task, and organized       Patient Detail:    During check-in, pt reported how much she is looking forward to discharge tomorrow.  Explained plan of her son coming to town to pick her up.  Spent group time making a gift for her granddaughter.  Occasionally needed brief assistance.  Friendly and social with peers.          Train & Education Service Per Session 45 + Minutes () OT Group code    Patient Active Problem List   Diagnosis    Chronic pain of left knee    Psychosis (H)    Acute encephalopathy    Paranoia (H)    Disorganized behavior    Psychosis, unspecified psychosis type (H)    Psychotic episode (H)

## 2024-07-09 ENCOUNTER — TELEPHONE (OUTPATIENT)
Dept: BEHAVIORAL HEALTH | Facility: CLINIC | Age: 74
End: 2024-07-09
Payer: MEDICARE

## 2024-07-09 NOTE — TELEPHONE ENCOUNTER
Writer contacted patient to discuss programming and current waitlist status; also wanted to provide patient an opportunity to ask questions regarding the program. Writer requested a call back to [phone].    [facility]i Puckett [facility] on 7/9/2024 at 2:09 PM

## 2024-07-09 NOTE — TELEPHONE ENCOUNTER
Patient returned writer's voicemail, stated she is not interested in 55+ at this time.    Meri Puckett, [facility] on 7/9/2024 at 2:12 PM

## 2024-07-11 ENCOUNTER — TELEPHONE (OUTPATIENT)
Dept: BEHAVIORAL HEALTH | Facility: CLINIC | Age: 74
End: 2024-07-11
Payer: MEDICARE

## 2024-07-17 ENCOUNTER — TELEPHONE (OUTPATIENT)
Dept: BEHAVIORAL HEALTH | Facility: CLINIC | Age: 74
End: 2024-07-17
Payer: MEDICARE

## 2024-07-17 NOTE — TELEPHONE ENCOUNTER
[facility]       Referral Date: 7/8/24    Reason for Referral:  Med-Management    Referral Status: (urgent or general)  General    Method of Communication:   Phone call    Plan or goal of contact/ previous contact information:   SW left VM with contact information and requested a return call on pt's son's voicemail.    Follow up required:   SW has attempted x3 outreaches. No further attempt will be made.     Work done on case:   N/a    Important Information and next steps:   N/a    NORMA Hagan   - Navigation [facility]  [email or web address]  [phone]

## 2024-12-30 ENCOUNTER — TRANSFERRED RECORDS (OUTPATIENT)
Dept: HEALTH INFORMATION MANAGEMENT | Facility: CLINIC | Age: 74
End: 2024-12-30
Payer: MEDICARE

## 2025-01-25 ENCOUNTER — HEALTH MAINTENANCE LETTER (OUTPATIENT)
Age: 75
End: 2025-01-25

## 2025-02-12 NOTE — PLAN OF CARE
BEH IP Unit Acuity Rating Score (UARS)  Patient is given one point for every criteria they meet.    CRITERIA SCORING   On a 72 hour hold, court hold, committed, stay of commitment, or revocation. 1    Patient LOS on BEH unit exceeds 20 days. 0  LOS: 1   Patient under guardianship, 55+, otherwise medically complex, or under age 11. 1   Suicide ideation without relief of precipitating factors. 0   Current plan for suicide. 0   Current plan for homicide. 0   Imminent risk or actual attempt to seriously harm another without relief of factors precipitating the attempt. 0   Severe dysfunction in daily living (ex: complete neglect for self care, extreme disruption in vegetative function, extreme deterioration in social interactions). 1   Recent (last 7 days) or current physical aggression in the ED or on unit. 0   Restraints or seclusion episode in past 72 hours. 0   Recent (last 7 days) or current verbal aggression, agitation, yelling, etc., while in the ED or unit. 0   Active psychosis. 1   Need for constant or near constant redirection (from leaving, from others, etc).  0   Intrusive or disruptive behaviors. 0   Patient requires 3 or more hours of individualized nursing care per 8-hour shift (i.e. for ADLs, meds, therapeutic interventions). 0   TOTAL 4        Pt notified.

## 2025-07-14 NOTE — PLAN OF CARE
"For vital signs and complete assessments, please see documentation flowsheets.    [id number]  Pertinent assessments: STEPHANIE orientation & perform neuro checks Pt was somnolent & slept throughout shift. Up Ax1. On RA. Afebrile. Elevated BP's, other VSS. No signs of pain, nausea, or SOB. PIV saline locked. Sitter at bedside overnight.    Major Shift Events: none    Treatment Plan:Symptom management. Monitor mentation. Neuro checks. Bedside attendant.    Bedside Nurse: Francisco Lund RN       Goal Outcome Evaluation:    Problem: Adult Inpatient Plan of Care  Goal: Plan of Care Review  Description: The Plan of Care Review/Shift note should be completed every shift.  The Outcome Evaluation is a brief statement about your assessment that the patient is improving, declining, or no change.  This information will be displayed automatically on your shift  note.  Outcome: Progressing  Flowsheets (Taken 6/16/2024 0550)  Outcome Evaluation: Pt slept throughout shift. No signs of pain, sitter at bedside overnight  Plan of Care Reviewed With: patient  Goal: Patient-Specific Goal (Individualized)  Description: You can add care plan individualizations to a care plan. Examples of Individualization might be:  \"Parent requests to be called daily at 9am for status\", \"I have a hard time hearing out of my right ear\", or \"Do not touch me to wake me up as it startles  me\".  Outcome: Progressing  Goal: Absence of Hospital-Acquired Illness or Injury  Outcome: Progressing  Intervention: Identify and Manage Fall Risk  Recent Flowsheet Documentation  Taken 6/16/2024 0124 by Francisco Lund, RN  Safety Promotion/Fall Prevention:   activity supervised   assistive device/personal items within reach   clutter free environment maintained   increase visualization of patient   nonskid shoes/slippers when out of bed   room near nurse's station   room organization consistent   safety round/check completed   supervised activity   treat reversible " Patient called wondering what will be discussed at her appt if there are no labs ordered. She is also wondering if he should have labs prior to coming in. Please call to discuss.    contributory factors  Intervention: Prevent Skin Injury  Recent Flowsheet Documentation  Taken 6/16/2024 0124 by Francisco Lund RN  Body Position: position changed independently  Intervention: Prevent and Manage VTE (Venous Thromboembolism) Risk  Recent Flowsheet Documentation  Taken 6/16/2024 0124 by Francisco Lund RN  VTE Prevention/Management: SCDs (sequential compression devices) off  Intervention: Prevent Infection  Recent Flowsheet Documentation  Taken 6/16/2024 0124 by Francisco Lund RN  Infection Prevention:   single patient room provided   rest/sleep promoted   hand hygiene promoted   cohorting utilized  Goal: Optimal Comfort and Wellbeing  Outcome: Progressing  Goal: Readiness for Transition of Care  Outcome: Progressing     Problem: Electrolyte Imbalance  Goal: Electrolyte Balance  Outcome: Progressing     Problem: Fatigue  Goal: Improved Activity Tolerance  Outcome: Progressing     Problem: Fall Injury Risk  Goal: Absence of Fall and Fall-Related Injury  Outcome: Progressing  Intervention: Identify and Manage Contributors  Recent Flowsheet Documentation  Taken 6/16/2024 0124 by Francisco Lund RN  Medication Review/Management: medications reviewed  Intervention: Promote Injury-Free Environment  Recent Flowsheet Documentation  Taken 6/16/2024 0124 by Francisco Lund RN  Safety Promotion/Fall Prevention:   activity supervised   assistive device/personal items within reach   clutter free environment maintained   increase visualization of patient   nonskid shoes/slippers when out of bed   room near nurse's station   room organization consistent   safety round/check completed   supervised activity   treat reversible contributory factors         Plan of Care Reviewed With: patient    Overall Patient Progress: no changeOverall Patient Progress: no change    Outcome Evaluation: Pt slept throughout shift. No signs of pain, sitter at bedside overnight

## 2025-07-18 ENCOUNTER — TRANSFERRED RECORDS (OUTPATIENT)
Dept: HEALTH INFORMATION MANAGEMENT | Facility: CLINIC | Age: 75
End: 2025-07-18
Payer: MEDICARE